# Patient Record
Sex: FEMALE | Race: WHITE | Employment: UNEMPLOYED | URBAN - METROPOLITAN AREA
[De-identification: names, ages, dates, MRNs, and addresses within clinical notes are randomized per-mention and may not be internally consistent; named-entity substitution may affect disease eponyms.]

---

## 2017-01-25 ENCOUNTER — APPOINTMENT (OUTPATIENT)
Dept: GENERAL RADIOLOGY | Age: 42
End: 2017-01-25
Attending: PHYSICIAN ASSISTANT
Payer: SELF-PAY

## 2017-01-25 ENCOUNTER — HOSPITAL ENCOUNTER (EMERGENCY)
Age: 42
Discharge: HOME | End: 2017-01-25
Attending: STUDENT IN AN ORGANIZED HEALTH CARE EDUCATION/TRAINING PROGRAM
Payer: SELF-PAY

## 2017-01-25 VITALS
HEART RATE: 82 BPM | RESPIRATION RATE: 16 BRPM | DIASTOLIC BLOOD PRESSURE: 85 MMHG | HEIGHT: 65 IN | WEIGHT: 290 LBS | SYSTOLIC BLOOD PRESSURE: 134 MMHG | OXYGEN SATURATION: 97 % | TEMPERATURE: 98.8 F | BODY MASS INDEX: 48.32 KG/M2

## 2017-01-25 DIAGNOSIS — S16.1XXA CERVICAL STRAIN, INITIAL ENCOUNTER: ICD-10-CM

## 2017-01-25 DIAGNOSIS — S09.90XA CHI (CLOSED HEAD INJURY), INITIAL ENCOUNTER: ICD-10-CM
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
## 2017-01-25 DIAGNOSIS — S39.012A LUMBAR STRAIN, INITIAL ENCOUNTER: ICD-10-CM

## 2017-01-25 PROCEDURE — 99283 EMERGENCY DEPT VISIT LOW MDM: CPT

## 2017-01-25 PROCEDURE — 72100 X-RAY EXAM L-S SPINE 2/3 VWS: CPT

## 2017-01-25 PROCEDURE — 74011250637 HC RX REV CODE- 250/637: Performed by: PHYSICIAN ASSISTANT

## 2017-01-25 PROCEDURE — 72050 X-RAY EXAM NECK SPINE 4/5VWS: CPT

## 2017-01-25 RX ORDER — CYCLOBENZAPRINE HCL 10 MG
10 TABLET ORAL
Status: COMPLETED | OUTPATIENT
Start: 2017-01-25 | End: 2017-01-25

## 2017-01-25 RX ORDER — CYCLOBENZAPRINE HCL 10 MG
10 TABLET ORAL
Qty: 12 TAB | Refills: 0 | Status: SHIPPED | OUTPATIENT
Start: 2017-01-25 | End: 2017-03-28

## 2017-01-25 RX ORDER — IBUPROFEN 600 MG/1
600 TABLET ORAL
Qty: 15 TAB | Refills: 0 | Status: SHIPPED | OUTPATIENT
Start: 2017-01-25 | End: 2017-03-28

## 2017-01-25 RX ADMIN — CYCLOBENZAPRINE HYDROCHLORIDE 10 MG: 10 TABLET, FILM COATED ORAL at 14:20
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
## 2017-01-25 NOTE — ED NOTES
Pt ambulatory out of ED with discharge instructions and prescriptions in hand given by JOSE L Morrison; pt verbalized understanding of discharge paperwork and time allotted for questions. VSS. Pt alert and oriented.  \

## 2017-01-27 DIAGNOSIS — F98.8 ADD (ATTENTION DEFICIT DISORDER): ICD-10-CM

## 2017-01-27 DIAGNOSIS — F41.9 ANXIETY: ICD-10-CM

## 2017-01-27 DIAGNOSIS — F42.9 OCD (OBSESSIVE COMPULSIVE DISORDER): ICD-10-CM

## 2017-01-27 DIAGNOSIS — E03.9 HYPOTHYROIDISM, UNSPECIFIED TYPE: ICD-10-CM

## 2017-01-27 RX ORDER — SERTRALINE HYDROCHLORIDE 100 MG/1
100 TABLET, FILM COATED ORAL DAILY
Qty: 90 TAB | Refills: 0 | Status: SHIPPED | OUTPATIENT
Start: 2017-01-27 | End: 2017-03-24 | Stop reason: SDUPTHER

## 2017-01-27 RX ORDER — DEXTROAMPHETAMINE SACCHARATE, AMPHETAMINE ASPARTATE, DEXTROAMPHETAMINE SULFATE AND AMPHETAMINE SULFATE 7.5; 7.5; 7.5; 7.5 MG/1; MG/1; MG/1; MG/1
30 TABLET ORAL DAILY
Qty: 30 TAB | Refills: 0 | Status: SHIPPED | OUTPATIENT
Start: 2017-01-27 | End: 2017-02-27 | Stop reason: SDUPTHER

## 2017-01-27 RX ORDER — LISINOPRIL AND HYDROCHLOROTHIAZIDE 10; 12.5 MG/1; MG/1
1 TABLET ORAL DAILY
Qty: 90 TAB | Refills: 0 | Status: SHIPPED | OUTPATIENT
Start: 2017-01-27 | End: 2017-03-24 | Stop reason: SDUPTHER

## 2017-01-27 RX ORDER — LEVOTHYROXINE SODIUM 150 UG/1
150 TABLET ORAL
Qty: 90 TAB | Refills: 0 | Status: SHIPPED | OUTPATIENT
Start: 2017-01-27 | End: 2017-03-24 | Stop reason: SDUPTHER

## 2017-02-23 ENCOUNTER — ED HISTORICAL/CONVERTED ENCOUNTER (OUTPATIENT)
Dept: OTHER | Age: 42
End: 2017-02-23

## 2017-02-27 DIAGNOSIS — F98.8 ADD (ATTENTION DEFICIT DISORDER): ICD-10-CM

## 2017-02-27 DIAGNOSIS — E03.9 HYPOTHYROIDISM, UNSPECIFIED TYPE: ICD-10-CM

## 2017-02-27 RX ORDER — DEXTROAMPHETAMINE SACCHARATE, AMPHETAMINE ASPARTATE, DEXTROAMPHETAMINE SULFATE AND AMPHETAMINE SULFATE 7.5; 7.5; 7.5; 7.5 MG/1; MG/1; MG/1; MG/1
30 TABLET ORAL DAILY
Qty: 30 TAB | Refills: 0 | Status: SHIPPED | OUTPATIENT
Start: 2017-02-27 | End: 2017-03-24 | Stop reason: SDUPTHER

## 2017-02-28 ENCOUNTER — OFFICE VISIT (OUTPATIENT)
Dept: INTERNAL MEDICINE CLINIC | Facility: CLINIC | Age: 42
End: 2017-02-28

## 2017-02-28 VITALS
SYSTOLIC BLOOD PRESSURE: 125 MMHG | RESPIRATION RATE: 16 BRPM | WEIGHT: 283 LBS | TEMPERATURE: 98.3 F | DIASTOLIC BLOOD PRESSURE: 84 MMHG | HEIGHT: 65 IN | HEART RATE: 90 BPM | OXYGEN SATURATION: 93 % | BODY MASS INDEX: 47.15 KG/M2

## 2017-02-28 DIAGNOSIS — J11.1 INFLUENZA: Primary | ICD-10-CM

## 2017-02-28 DIAGNOSIS — J40 BRONCHITIS: ICD-10-CM

## 2017-02-28 RX ORDER — ALBUTEROL SULFATE 90 UG/1
1 AEROSOL, METERED RESPIRATORY (INHALATION)
Qty: 1 INHALER | Refills: 0 | Status: SHIPPED | OUTPATIENT
Start: 2017-02-28 | End: 2018-02-06 | Stop reason: SDUPTHER

## 2017-02-28 RX ORDER — BENZONATATE 200 MG/1
200 CAPSULE ORAL
Qty: 30 CAP | Refills: 0 | Status: SHIPPED | OUTPATIENT
Start: 2017-02-28 | End: 2017-03-07

## 2017-02-28 RX ORDER — METHYLPREDNISOLONE 4 MG/1
TABLET ORAL
Qty: 1 DOSE PACK | Refills: 0 | Status: SHIPPED | OUTPATIENT
Start: 2017-02-28 | End: 2017-03-28

## 2017-02-28 NOTE — PROGRESS NOTES
Chief Complaint   Patient presents with    Cough     Pt stated diagnosed with the flu last week and now she has has developed a dry cough    Shortness of Breath     1. Have you been to the ER, urgent care clinic since your last visit? Hospitalized since your last visit? Yes, [facility] last week for the Flu    2. Have you seen or consulted any other health care providers outside of the [facility] since your last visit? Include any pap smears or colon screening.   No

## 2017-02-28 NOTE — LETTER
NOTIFICATION RETURN TO WORK / SCHOOL 
 
2/28/2017 12:50 PM 
 
Ms. Shmuel Rodríguez [address] [place] [address] To Whom It May Concern: 
 
Shmuel Rodríguez is currently under the care of Susy Atkinson.. Please excuse Ms. Soares from work 2/23/17-3/5/17. She will return to work/school on: 3/6/17. If there are questions or concerns please have the patient contact our office. Sincerely, Jarred Isbell PA-C

## 2017-02-28 NOTE — PROGRESS NOTES
HISTORY OF PRESENT ILLNESS  Duke Gitelman is a 42 y.o. female. HPI  1) Sick since 2/20. Dx with the flu last week on 2/23 at [facility] - positive flu test. Didn't get Tamiflu because she was outside of the 48 hour window. Cough persists and seems to be getting worse - also now SOB. Cough is productive at night and in the early morning. Taking Mucinex, but pt feels that it is not helping. No nasal congestion/fever/ear pain. Sore throat only with cough. Review of Systems   Constitutional: Positive for malaise/fatigue. Negative for fever. HENT: Positive for congestion. Negative for ear pain. Respiratory: Positive for cough, sputum production and shortness of breath. Endo/Heme/Allergies: Negative for environmental allergies. Physical Exam   Constitutional: She is oriented to person, place, and time. She appears well-developed and well-nourished. No distress. HENT:   Head: Normocephalic and atraumatic. Right Ear: External ear normal.   Left Ear: External ear normal.   Mouth/Throat: Oropharynx is clear and moist.   Nasal mucosa red, inflamed. Eyes: Conjunctivae are normal.   Neck: Neck supple. Cardiovascular: Normal rate, regular rhythm and normal heart sounds. Pulmonary/Chest: Effort normal and breath sounds normal.   Lymphadenopathy:     She has no cervical adenopathy. Neurological: She is alert and oriented to person, place, and time. Skin: Skin is warm and dry. Nursing note and vitals reviewed. ASSESSMENT and PLAN    ICD-10-CM ICD-9-CM    1. Influenza J11.1 487.1 Advised pt that the flu often takes 2 weeks to resolve. Work note written   2. Bronchitis J40 490 Secondary to influenza. Call Friday [facility] for AB for possible secondary bacterial infection.      albuterol (PROVENTIL HFA, VENTOLIN HFA, PROAIR HFA) 90 mcg/actuation inhaler      benzonatate (TESSALON) 200 mg capsule      methylPREDNISolone (MEDROL DOSEPACK) 4 mg tablet

## 2017-03-09 ENCOUNTER — TELEPHONE (OUTPATIENT)
Dept: INTERNAL MEDICINE CLINIC | Facility: CLINIC | Age: 42
End: 2017-03-09

## 2017-03-09 NOTE — TELEPHONE ENCOUNTER
[phone]  Patient is calling to inform JOSE L Santillan that her cough is not getting any better. Patient mentioned she was instructed to call office if symptoms did not improve and that JOSE L Santillan would prescribe her an antibiotic.

## 2017-03-10 RX ORDER — AMOXICILLIN AND CLAVULANATE POTASSIUM 875; 125 MG/1; MG/1
1 TABLET, FILM COATED ORAL EVERY 12 HOURS
Qty: 14 TAB | Refills: 0 | Status: SHIPPED | OUTPATIENT
Start: 2017-03-10 | End: 2017-03-28

## 2017-03-10 NOTE — TELEPHONE ENCOUNTER
Please inform pt that I sent in an antibiotic and that I'm sorry the message was sent to the wrong provider yesterday.

## 2017-03-13 RX ORDER — FLUCONAZOLE 150 MG/1
150 TABLET ORAL
Qty: 2 TAB | Refills: 0 | Status: SHIPPED | OUTPATIENT
Start: 2017-03-13 | End: 2017-03-21

## 2017-03-13 NOTE — TELEPHONE ENCOUNTER
Spoke to patient, who states that she is taking the antibiotics but is now experiencing some yeast infection as a side effect and is requesting some diflucan to be sent to the pharmacy

## 2017-03-28 ENCOUNTER — OFFICE VISIT (OUTPATIENT)
Dept: INTERNAL MEDICINE CLINIC | Facility: CLINIC | Age: 42
End: 2017-03-28

## 2017-03-28 VITALS
TEMPERATURE: 99.4 F | BODY MASS INDEX: 47.98 KG/M2 | RESPIRATION RATE: 18 BRPM | HEIGHT: 65 IN | WEIGHT: 288 LBS | DIASTOLIC BLOOD PRESSURE: 78 MMHG | OXYGEN SATURATION: 96 % | HEART RATE: 97 BPM | SYSTOLIC BLOOD PRESSURE: 133 MMHG

## 2017-03-28 DIAGNOSIS — E03.9 HYPOTHYROIDISM, UNSPECIFIED TYPE: ICD-10-CM

## 2017-03-28 DIAGNOSIS — R79.82 CRP ELEVATED: ICD-10-CM

## 2017-03-28 DIAGNOSIS — E66.01 MORBID OBESITY, UNSPECIFIED OBESITY TYPE (HCC): ICD-10-CM

## 2017-03-28 DIAGNOSIS — N76.0 ACUTE VAGINITIS: Primary | ICD-10-CM

## 2017-03-28 RX ORDER — TINIDAZOLE 500 MG/1
1 TABLET ORAL
Qty: 10 TAB | Refills: 0 | Status: SHIPPED | OUTPATIENT
Start: 2017-03-28 | End: 2017-10-19 | Stop reason: SDUPTHER

## 2017-03-28 NOTE — MR AVS SNAPSHOT
Visit Information Date & Time Provider Department Dept. Phone Encounter #  
 3/28/2017 10:15 AM Tila Iqbal PA-C [facility] Internal Medicine [phone] [id number] Follow-up Instructions Return in about 6 weeks (around 5/9/2017) for 45 minute physical with pap. Upcoming Health Maintenance Date Due  
 PAP AKA CERVICAL CYTOLOGY 7/28/2016 DTaP/Tdap/Td series (2 - Td) 9/6/2026 Allergies as of 3/28/2017  Review Complete On: 3/28/2017 By: Tila Iqbal PA-C Severity Noted Reaction Type Reactions Darvocet A500 [Propoxyphene N-acetaminophen]  12/01/2010    Hives Current Immunizations  Never Reviewed Name Date Influenza Vaccine (Quad) PF 9/6/2016 Tdap 9/6/2016 Not reviewed this visit You Were Diagnosed With   
  
 Codes Comments Acute vaginitis    -  Primary ICD-10-CM: N76.0 ICD-9-CM: 616.10 Morbid obesity, unspecified obesity type ([facility] [address]     ICD-10-CM: E66.01 
ICD-9-CM: 278.01 Hypothyroidism, unspecified type     ICD-10-CM: E03.9 ICD-9-CM: 244.9 CRP elevated     ICD-10-CM: R79.82 ICD-9-CM: 790.95 Vitals BP Pulse Temp Resp Height(growth percentile) Weight(growth percentile) 133/78 (BP 1 Location: Left arm, BP Patient Position: Sitting) 97 99.4 °F (37.4 °C) (Oral) 18 5' 5\" (1.651 m) 288 lb (130.6 kg) LMP SpO2 BMI OB Status Smoking Status 03/20/2017 96% 47.93 kg/m2 Having regular periods Never Smoker Vitals History BMI and BSA Data Body Mass Index Body Surface Area  
 47.93 kg/m 2 2.45 m 2 Preferred Pharmacy Pharmacy Name Phone [facility] [address] Your Updated Medication List  
  
   
This list is accurate as of: 3/28/17 10:59 AM.  Always use your most recent med list.  
  
  
  
  
 albuterol 90 mcg/actuation inhaler Commonly known as:  PROVENTIL HFA, VENTOLIN HFA, PROAIR HFA  
 Take 1 Puff by inhalation every four (4) hours as needed for Shortness of Breath. dextroamphetamine-amphetamine 30 mg tablet Commonly known as:  ADDERALL Take 1 Tab by mouth dailyEarliest Fill Date: 3/27/17. Max Daily Amount: 1 Tab  
  
 levothyroxine 150 mcg tablet Commonly known as:  SYNTHROID Take 1 Tab by mouth Daily (before breakfast). Indications: hypothyroidism  
  
 lisinopril-hydroCHLOROthiazide 10-12.5 mg per tablet Commonly known as:  Alisha Schilder Take 1 Tab by mouth daily. sertraline 100 mg tablet Commonly known as:  ZOLOFT Take 1 Tab by mouth daily. Indications: GENERALIZED ANXIETY DISORDER  
  
 tinidazole 500 mg tablet Commonly known as:  KJWTSSSG Take 2 Tabs by mouth Daily (before breakfast) for 5 days. Indications: BACTERIAL VAGINOSIS Prescriptions Sent to Pharmacy Refills  
 tinidazole (TINDAMAX) 500 mg tablet 0 Sig: Take 2 Tabs by mouth Daily (before breakfast) for 5 days. Indications: BACTERIAL VAGINOSIS Class: Normal  
 Pharmacy: [address] [place] [address] #: [phone] Route: Oral  
  
We Performed the Following C REACTIVE PROTEIN, QT [88856 CPT(R)] [address] [id number] Custom] Comments:  
 [employer] Not Covered REFERRAL TO BARIATRIC SURGERY [HED321 Custom] THYROID CASCADE PROFILE [[id number] Custom] Follow-up Instructions Return in about 6 weeks (around 5/9/2017) for 45 minute physical with pap. Referral Information Referral ID Referred By Referred To  
  
 [id number] [place] MD   
   [address] [place] [address] [place] Phone: [phone] Fax: [phone] Visits Status Start Date End Date 1 New Request 3/28/17 3/28/18 If your referral has a status of pending review or denied, additional information will be sent to support the outcome of this decision. Introducing [facility] & HEALTH SERVICES! Dear Wilma Jane: Thank you for requesting a y prime account. Our records indicate that you already have an active y prime account. You can access your account anytime at [email or web address] Did you know that you can access your hospital and ER discharge instructions at any time in y prime? You can also review all of your test results from your hospital stay or ER visit. Additional Information If you have questions, please visit the Frequently Asked Questions section of the y prime website at [email or web address] Remember, y prime is NOT to be used for urgent needs. For medical emergencies, dial 911. Now available from your iPhone and Android! Please provide this summary of care documentation to your next provider. Your primary care clinician is listed as Erwin Thomas. If you have any questions after today's visit, please call [phone].

## 2017-03-28 NOTE — PROGRESS NOTES
Room 8    Chief Complaint   Patient presents with    Vaginal Discharge     Patient believes she has bacteria vaginosis     1. Have you been to the ER, urgent care clinic since your last visit? Hospitalized since your last visit? No    2. Have you seen or consulted any other health care providers outside of the [facility] since your last visit? Include any pap smears or colon screening.  No     Health Maintenance Due   Topic Date Due    PAP AKA CERVICAL CYTOLOGY  07/28/2016

## 2017-03-31 ENCOUNTER — TELEPHONE (OUTPATIENT)
Dept: INTERNAL MEDICINE CLINIC | Facility: CLINIC | Age: 42
End: 2017-03-31

## 2017-03-31 DIAGNOSIS — E03.9 HYPOTHYROIDISM, UNSPECIFIED TYPE: ICD-10-CM

## 2017-03-31 DIAGNOSIS — R79.82 CRP ELEVATED: Primary | ICD-10-CM

## 2017-03-31 LAB
A VAGINAE DNA VAG QL NAA+PROBE: ABNORMAL SCORE
BVAB2 DNA VAG QL NAA+PROBE: ABNORMAL SCORE
C ALBICANS DNA VAG QL NAA+PROBE: NEGATIVE
C GLABRATA DNA VAG QL NAA+PROBE: NEGATIVE
C TRACH RRNA SPEC QL NAA+PROBE: NEGATIVE
CRP SERPL-MCNC: 8.4 MG/L (ref 0–4.9)
INTERPRETIVE COMMENT, 330017: ABNORMAL
MEGA1 DNA VAG QL NAA+PROBE: ABNORMAL SCORE
N GONORRHOEA RRNA SPEC QL NAA+PROBE: NEGATIVE
T VAGINALIS RRNA SPEC QL NAA+PROBE: NEGATIVE
T4 FREE SERPL-MCNC: 0.95 NG/DL (ref 0.82–1.77)
THYROPEROXIDASE AB SERPL-ACNC: 285 IU/ML (ref 0–34)
TSH SERPL DL<=0.005 MIU/L-ACNC: 8.01 UIU/ML (ref 0.45–4.5)

## 2017-03-31 RX ORDER — LEVOTHYROXINE SODIUM 175 UG/1
175 TABLET ORAL
Qty: 30 TAB | Refills: 5 | Status: SHIPPED | OUTPATIENT
Start: 2017-03-31 | End: 2017-04-27 | Stop reason: SDUPTHER

## 2017-03-31 NOTE — PROGRESS NOTES
HISTORY OF PRESENT ILLNESS  Nichola Saint is a 42 y.o. female. HPI  1) ? BV - Previously had BV every month. Rx'd Tindamax  This started immediately after menses this month. Symptoms x 2 days. Using tampons during the day and pads at night for period. Has not changed brands in years. Washing with Zest. Has not changed soaps in years. In the past, pt reports that when she had:  BV - brown discharge with odor  Yeast - discharge is white and there is no odor. Had 2 rounds of antibiotics earlier this month. Took Diflucan 1 pill weekly x 2 weeks. In the past, required Tinidazole for BV. Pt reports dose was 2000 mg x 10 days. 2) Wants referral to bariatric surgeon. Insurance has recently changed. Has changed eating habits (stopped bread/sodas, eating yogurt regularly, stopped eating starches at night). Walking 1.7 miles twice daily 5x/week. Wt Readings from Last 3 Encounters:   03/28/17 288 lb (130.6 kg)   02/28/17 283 lb (128.4 kg)   01/25/17 290 lb (131.5 kg)     3) Due for repeat thyroid labs    Review of Systems   Constitutional: Positive for malaise/fatigue. Negative for weight loss. Gastrointestinal: Negative for abdominal pain. Genitourinary:        Vaginal discharge - brown, curdy       Physical Exam   Constitutional: She is oriented to person, place, and time. She appears well-developed and well-nourished. No distress. HENT:   Head: Normocephalic and atraumatic. Neck: Neck supple. No JVD present. Cardiovascular: Normal rate, regular rhythm and normal heart sounds. Pulmonary/Chest: Effort normal and breath sounds normal. No respiratory distress. Genitourinary:   Genitourinary Comments: Internal labia and vaginal opening red, irritated. Clear viscous discharge noted. No odor. Musculoskeletal: She exhibits no edema. Neurological: She is alert and oriented to person, place, and time. Skin: Skin is warm and dry. Psychiatric: She has a normal mood and affect.  Her behavior is normal. Judgment and thought content normal.   Nursing note and vitals reviewed. ASSESSMENT and PLAN    ICD-10-CM ICD-9-CM    1. Acute vaginitis N76.0 616.10 Suspect BV  tinidazole (TINDAMAX) 500 mg tablet      NUSWAB VAGINITIS PLUS    Suggested that pt change to [employer] and try menstrual cup or other tampon brand to see if this prevents reoccurrence of BV. 2. Morbid obesity, unspecified obesity type ([facility] [address] E66.01 278.01 REFERRAL TO BARIATRIC SURGERY   3. Hypothyroidism, unspecified type E03.9 244.9 THYROID CASCADE PROFILE   4.  CRP elevated R79.82 790.95 C REACTIVE PROTEIN, QT 7

## 2017-03-31 NOTE — TELEPHONE ENCOUNTER
Persistently elevated CRP - referral to rheum. Not yet adequately treated hypothyroidism. Increase dose to 175 mcg. Sent pt a message.

## 2017-04-27 DIAGNOSIS — E03.9 HYPOTHYROIDISM, UNSPECIFIED TYPE: ICD-10-CM

## 2017-04-27 DIAGNOSIS — F98.8 ADD (ATTENTION DEFICIT DISORDER): ICD-10-CM

## 2017-04-27 DIAGNOSIS — F41.9 ANXIETY: ICD-10-CM

## 2017-04-27 RX ORDER — LEVOTHYROXINE SODIUM 175 UG/1
175 TABLET ORAL
Qty: 30 TAB | Refills: 5 | Status: SHIPPED | OUTPATIENT
Start: 2017-04-27 | End: 2017-05-21 | Stop reason: SDUPTHER

## 2017-04-27 RX ORDER — LISINOPRIL AND HYDROCHLOROTHIAZIDE 10; 12.5 MG/1; MG/1
1 TABLET ORAL DAILY
Qty: 90 TAB | Refills: 1 | Status: SHIPPED | OUTPATIENT
Start: 2017-04-27 | End: 2017-06-26 | Stop reason: SDUPTHER

## 2017-04-27 RX ORDER — SERTRALINE HYDROCHLORIDE 100 MG/1
100 TABLET, FILM COATED ORAL DAILY
Qty: 90 TAB | Refills: 3 | Status: SHIPPED | OUTPATIENT
Start: 2017-04-27 | End: 2017-05-21 | Stop reason: SDUPTHER

## 2017-04-27 RX ORDER — DEXTROAMPHETAMINE SACCHARATE, AMPHETAMINE ASPARTATE, DEXTROAMPHETAMINE SULFATE AND AMPHETAMINE SULFATE 7.5; 7.5; 7.5; 7.5 MG/1; MG/1; MG/1; MG/1
30 TABLET ORAL DAILY
Qty: 30 TAB | Refills: 0 | Status: SHIPPED | OUTPATIENT
Start: 2017-04-27 | End: 2017-05-21 | Stop reason: SDUPTHER

## 2017-06-26 DIAGNOSIS — F41.9 ANXIETY: ICD-10-CM

## 2017-06-26 DIAGNOSIS — F98.8 ADD (ATTENTION DEFICIT DISORDER): ICD-10-CM

## 2017-06-26 DIAGNOSIS — E03.9 HYPOTHYROIDISM, UNSPECIFIED TYPE: ICD-10-CM

## 2017-06-26 RX ORDER — LEVOTHYROXINE SODIUM 175 UG/1
175 TABLET ORAL
Qty: 30 TAB | Refills: 5 | Status: SHIPPED | OUTPATIENT
Start: 2017-06-26 | End: 2017-07-21 | Stop reason: SDUPTHER

## 2017-06-26 RX ORDER — LISINOPRIL AND HYDROCHLOROTHIAZIDE 10; 12.5 MG/1; MG/1
1 TABLET ORAL DAILY
Qty: 90 TAB | Refills: 1 | Status: SHIPPED | OUTPATIENT
Start: 2017-06-26 | End: 2017-07-21 | Stop reason: SDUPTHER

## 2017-06-26 RX ORDER — DEXTROAMPHETAMINE SACCHARATE, AMPHETAMINE ASPARTATE, DEXTROAMPHETAMINE SULFATE AND AMPHETAMINE SULFATE 7.5; 7.5; 7.5; 7.5 MG/1; MG/1; MG/1; MG/1
30 TABLET ORAL DAILY
Qty: 30 TAB | Refills: 0 | Status: SHIPPED | OUTPATIENT
Start: 2017-06-26 | End: 2017-07-21 | Stop reason: SDUPTHER

## 2017-06-26 RX ORDER — SERTRALINE HYDROCHLORIDE 100 MG/1
100 TABLET, FILM COATED ORAL DAILY
Qty: 90 TAB | Refills: 3 | Status: SHIPPED | OUTPATIENT
Start: 2017-06-26 | End: 2017-07-21 | Stop reason: SDUPTHER

## 2017-07-21 DIAGNOSIS — F41.9 ANXIETY: ICD-10-CM

## 2017-07-21 DIAGNOSIS — E03.9 HYPOTHYROIDISM, UNSPECIFIED TYPE: ICD-10-CM

## 2017-07-21 DIAGNOSIS — F98.8 ADD (ATTENTION DEFICIT DISORDER): ICD-10-CM

## 2017-07-21 RX ORDER — LEVOTHYROXINE SODIUM 175 UG/1
175 TABLET ORAL
Qty: 30 TAB | Refills: 5 | Status: SHIPPED | OUTPATIENT
Start: 2017-07-21 | End: 2017-08-31 | Stop reason: SDUPTHER

## 2017-07-21 RX ORDER — LISINOPRIL AND HYDROCHLOROTHIAZIDE 10; 12.5 MG/1; MG/1
1 TABLET ORAL DAILY
Qty: 90 TAB | Refills: 1 | Status: SHIPPED | OUTPATIENT
Start: 2017-07-21 | End: 2017-10-24 | Stop reason: SDUPTHER

## 2017-07-21 RX ORDER — DEXTROAMPHETAMINE SACCHARATE, AMPHETAMINE ASPARTATE, DEXTROAMPHETAMINE SULFATE AND AMPHETAMINE SULFATE 7.5; 7.5; 7.5; 7.5 MG/1; MG/1; MG/1; MG/1
30 TABLET ORAL DAILY
Qty: 30 TAB | Refills: 0 | Status: SHIPPED | OUTPATIENT
Start: 2017-07-21 | End: 2017-08-31 | Stop reason: SDUPTHER

## 2017-07-21 RX ORDER — SERTRALINE HYDROCHLORIDE 100 MG/1
100 TABLET, FILM COATED ORAL DAILY
Qty: 90 TAB | Refills: 3 | Status: SHIPPED | OUTPATIENT
Start: 2017-07-21 | End: 2017-08-31 | Stop reason: SDUPTHER

## 2017-07-21 NOTE — TELEPHONE ENCOUNTER
Pt called stating she needs a refill on all of her medications because she left them at her daughter's house and is unable to get them medications.  Please advise [phone]

## 2017-08-31 ENCOUNTER — OFFICE VISIT (OUTPATIENT)
Dept: INTERNAL MEDICINE CLINIC | Facility: CLINIC | Age: 42
End: 2017-08-31

## 2017-08-31 VITALS
HEIGHT: 65 IN | HEART RATE: 98 BPM | DIASTOLIC BLOOD PRESSURE: 79 MMHG | TEMPERATURE: 99.8 F | SYSTOLIC BLOOD PRESSURE: 124 MMHG | BODY MASS INDEX: 48.32 KG/M2 | RESPIRATION RATE: 18 BRPM | OXYGEN SATURATION: 96 % | WEIGHT: 290 LBS

## 2017-08-31 DIAGNOSIS — E03.9 HYPOTHYROIDISM, UNSPECIFIED TYPE: ICD-10-CM

## 2017-08-31 DIAGNOSIS — Z23 ENCOUNTER FOR IMMUNIZATION: ICD-10-CM

## 2017-08-31 DIAGNOSIS — G47.30 SLEEP APNEA, UNSPECIFIED TYPE: ICD-10-CM

## 2017-08-31 DIAGNOSIS — I10 ESSENTIAL HYPERTENSION: Primary | ICD-10-CM

## 2017-08-31 DIAGNOSIS — F41.9 ANXIETY: ICD-10-CM

## 2017-08-31 DIAGNOSIS — E66.01 MORBID OBESITY, UNSPECIFIED OBESITY TYPE (HCC): ICD-10-CM

## 2017-08-31 DIAGNOSIS — F98.8 ADD (ATTENTION DEFICIT DISORDER): ICD-10-CM

## 2017-08-31 RX ORDER — SERTRALINE HYDROCHLORIDE 100 MG/1
100 TABLET, FILM COATED ORAL DAILY
Qty: 90 TAB | Refills: 3 | Status: SHIPPED | OUTPATIENT
Start: 2017-08-31 | End: 2017-12-26

## 2017-08-31 RX ORDER — LEVOTHYROXINE SODIUM 175 UG/1
175 TABLET ORAL
Qty: 30 TAB | Refills: 1 | Status: SHIPPED | OUTPATIENT
Start: 2017-08-31 | End: 2017-10-24 | Stop reason: SDUPTHER

## 2017-08-31 RX ORDER — DEXTROAMPHETAMINE SACCHARATE, AMPHETAMINE ASPARTATE, DEXTROAMPHETAMINE SULFATE AND AMPHETAMINE SULFATE 7.5; 7.5; 7.5; 7.5 MG/1; MG/1; MG/1; MG/1
30 TABLET ORAL DAILY
Qty: 30 TAB | Refills: 0 | Status: SHIPPED | OUTPATIENT
Start: 2017-08-31 | End: 2017-09-25 | Stop reason: SDUPTHER

## 2017-08-31 NOTE — PROGRESS NOTES
Room 8    Chief Complaint   Patient presents with    Medication Refill     Follow up     1. Have you been to the ER, urgent care clinic since your last visit? Hospitalized since your last visit? No    2. Have you seen or consulted any other health care providers outside of the [address] [place] [address] since your last visit? Include any pap smears or colon screening.  No     Health Maintenance Due   Topic Date Due    PAP AKA CERVICAL CYTOLOGY  07/28/2016    INFLUENZA AGE 9 TO ADULT  08/01/2017

## 2017-08-31 NOTE — MR AVS SNAPSHOT
Visit Information Date & Time Provider Department Dept. Phone Encounter #  
 8/31/2017  1:45 PM Duke Wilkins PA-C [facility] Internal Medicine [phone] [id number] Follow-up Instructions Return in about 2 weeks (around 9/14/2017) for Follow up HTN, Sleep Apnea, CRP with fasting labs that day as well. Sepideh Peaks Upcoming Health Maintenance Date Due  
 PAP AKA CERVICAL CYTOLOGY 7/28/2016 INFLUENZA AGE 9 TO ADULT 8/1/2017 DTaP/Tdap/Td series (2 - Td) 9/6/2026 Allergies as of 8/31/2017  Review Complete On: 8/31/2017 By: Raymundo Romero LPN Severity Noted Reaction Type Reactions Darvocet A500 [Propoxyphene N-acetaminophen]  12/01/2010    Hives Current Immunizations  Never Reviewed Name Date Influenza Vaccine (Quad) PF  Incomplete, 9/6/2016 Tdap 9/6/2016 Not reviewed this visit You Were Diagnosed With   
  
 Codes Comments Encounter for immunization    -  Primary ICD-10-CM: U92 ICD-9-CM: V03.89 Essential hypertension     ICD-10-CM: I10 
ICD-9-CM: 401.9 Vitals BP Pulse Temp Resp Height(growth percentile) Weight(growth percentile) 124/79 (BP 1 Location: Left arm, BP Patient Position: Sitting) 98 99.8 °F (37.7 °C) (Oral) 18 5' 5\" (1.651 m) 290 lb (131.5 kg) SpO2 BMI OB Status Smoking Status 96% 48.26 kg/m2 Having regular periods Never Smoker Vitals History BMI and BSA Data Body Mass Index Body Surface Area  
 48.26 kg/m 2 2.46 m 2 Preferred Pharmacy Pharmacy Name Phone [facility] [address] [facility] [phone] Your Updated Medication List  
  
   
This list is accurate as of: 8/31/17  2:09 PM.  Always use your most recent med list.  
  
  
  
  
 albuterol 90 mcg/actuation inhaler Commonly known as:  PROVENTIL HFA, VENTOLIN HFA, PROAIR HFA Take 1 Puff by inhalation every four (4) hours as needed for Shortness of Breath. dextroamphetamine-amphetamine 30 mg tablet Commonly known as:  ADDERALL Take 1 Tab by mouth dailyEarliest Fill Date: 7/21/17. Max Daily Amount: 1 Tab  
  
 levothyroxine 175 mcg tablet Commonly known as:  SYNTHROID Take 1 Tab by mouth Daily (before breakfast). lisinopril-hydroCHLOROthiazide 10-12.5 mg per tablet Commonly known as:  Margie Buttner Take 1 Tab by mouth daily. sertraline 100 mg tablet Commonly known as:  ZOLOFT Take 1 Tab by mouth daily. Indications: GENERALIZED ANXIETY DISORDER We Performed the Following INFLUENZA VIRUS VAC QUAD,SPLIT,PRESV FREE SYRINGE 3/> YRS IM [id number] CPT(R)] Follow-up Instructions Return in about 2 weeks (around 9/14/2017) for Follow up HTN, Sleep Apnea, CRP with fasting labs that day as well. .  
  
  
Introducing [facility] & HEALTH SERVICES! Dear Eric Crabtree: Thank you for requesting a Spinzo account. Our records indicate that you already have an active Spinzo account. You can access your account anytime at [email or web address] A.P Avanashiappa Silk/TBi Connect Did you know that you can access your hospital and ER discharge instructions at any time in Spinzo? You can also review all of your test results from your hospital stay or ER visit. Additional Information If you have questions, please visit the Frequently Asked Questions section of the Spinzo website at [email or web address] Remember, Spinzo is NOT to be used for urgent needs. For medical emergencies, dial 911. Now available from your iPhone and Android! Please provide this summary of care documentation to your next provider. Your primary care clinician is listed as Dinah Engle. If you have any questions after today's visit, please call [phone].

## 2017-08-31 NOTE — PROGRESS NOTES
HISTORY OF PRESENT ILLNESS  Maria Elena Calderon is a 36 y.o. female. HPI  1) HTN - Controlled today off of medication x 2 weeks. Pt notes no change in diet/exercise. She was not able to have bariatric surgery because of lack of insurance coverage. She is eating a high carb, low protein diet, and does not know her calorie count/day. She is not exercising. Pt has gained 2 lbs since last visit. Wt Readings from Last 3 Encounters:   08/31/17 290 lb (131.5 kg)   03/28/17 288 lb (130.6 kg)   02/28/17 283 lb (128.4 kg)     2) Hypothyroidism - Uncontrolled at last visit. Off of medication x 2 weeks. Lab Results   Component Value Date/Time    TSH 8.010 03/28/2017 10:55 AM    TSH 30.220 09/06/2016 11:37 AM     3) ADD - Taking Adderall 30 mg every day. Doing well on this dose. 4) Elevated CRP - persisted at recheck in March - rheumatology visit suggested 5 months ago. Needs recheck on CRP. Review of Systems   Respiratory: Negative for shortness of breath. Cardiovascular: Negative for chest pain and palpitations. Neurological: Negative for dizziness, loss of consciousness and weakness. Physical Exam   Constitutional: She is oriented to person, place, and time. She appears well-developed and well-nourished. No distress. HENT:   Head: Normocephalic and atraumatic. Neck: Neck supple. No JVD present. Cardiovascular: Normal rate, regular rhythm and normal heart sounds. Pulmonary/Chest: Effort normal and breath sounds normal. No respiratory distress. Musculoskeletal: She exhibits no edema. Neurological: She is alert and oriented to person, place, and time. Skin: Skin is warm and dry. Psychiatric: She has a normal mood and affect. Her behavior is normal. Judgment and thought content normal.   Nursing note and vitals reviewed. ASSESSMENT and PLAN    ICD-10-CM ICD-9-CM    1. Essential hypertension I10 401.9 Controlled off of medication. Remain off of medication and follow up in 2 weeks. 2. Encounter for immunization Z23 V03.89 INFLUENZA VIRUS VAC QUAD,SPLIT,PRESV FREE SYRINGE 3/> YRS IM   3. Hypothyroidism, unspecified type E03.9 244.9 Refill levothyroxine (SYNTHROID) 175 mcg tablet. Recheck labs in 2 weeks    4. Morbid obesity, unspecified obesity type ([facility] E66.01 278.01 Discussed high protein, low carb, low calorie diet and increase exercise if possible. Suggested My Fitness Pal.    5. Sleep apnea, unspecified type G47.30 780.57 Follow up with Sleep Med.    6. ADD (attention deficit disorder) F98.8 314.00 Controlled. Refill dextroamphetamine-amphetamine (ADDERALL) 30 mg tablet   7.  Anxiety F41.9 300.00 Refill sertraline (ZOLOFT) 100 mg tablet

## 2017-09-25 DIAGNOSIS — F98.8 ADD (ATTENTION DEFICIT DISORDER): ICD-10-CM

## 2017-09-25 RX ORDER — DEXTROAMPHETAMINE SACCHARATE, AMPHETAMINE ASPARTATE, DEXTROAMPHETAMINE SULFATE AND AMPHETAMINE SULFATE 7.5; 7.5; 7.5; 7.5 MG/1; MG/1; MG/1; MG/1
30 TABLET ORAL DAILY
Qty: 30 TAB | Refills: 0 | Status: SHIPPED | OUTPATIENT
Start: 2017-09-25 | End: 2017-10-24 | Stop reason: SDUPTHER

## 2017-10-24 DIAGNOSIS — E03.9 HYPOTHYROIDISM, UNSPECIFIED TYPE: ICD-10-CM

## 2017-10-24 DIAGNOSIS — F98.8 ADD (ATTENTION DEFICIT DISORDER): ICD-10-CM

## 2017-10-24 RX ORDER — DEXTROAMPHETAMINE SACCHARATE, AMPHETAMINE ASPARTATE, DEXTROAMPHETAMINE SULFATE AND AMPHETAMINE SULFATE 7.5; 7.5; 7.5; 7.5 MG/1; MG/1; MG/1; MG/1
30 TABLET ORAL DAILY
Qty: 30 TAB | Refills: 0 | Status: SHIPPED | OUTPATIENT
Start: 2017-10-24 | End: 2017-11-20 | Stop reason: SDUPTHER

## 2017-10-24 RX ORDER — LEVOTHYROXINE SODIUM 175 UG/1
175 TABLET ORAL
Qty: 90 TAB | Refills: 1 | Status: SHIPPED | OUTPATIENT
Start: 2017-10-24 | End: 2018-02-20 | Stop reason: SDUPTHER

## 2017-10-24 RX ORDER — LISINOPRIL AND HYDROCHLOROTHIAZIDE 10; 12.5 MG/1; MG/1
1 TABLET ORAL DAILY
Qty: 90 TAB | Refills: 1 | Status: SHIPPED | OUTPATIENT
Start: 2017-10-24 | End: 2017-12-26

## 2017-10-24 NOTE — TELEPHONE ENCOUNTER
From: Prudencio Soares  To: Izabela Patel PA-C  Sent: 10/24/2017 10:08 AM EDT  Subject: Medication Renewal Request    Original authorizing provider: Izabela Patel PA-C    Tricia Parisiambikalawrence Healy  Rodger would like a refill of the following medications:  lisinopril-hydroCHLOROthiazide (PRINZIDE, ZESTORETIC) 10-12.5 mg per tablet Izabela Patel PA-C]  levothyroxine (SYNTHROID) 175 mcg tablet Izabela Patel PA-C]  dextroamphetamine-amphetamine (ADDERALL) 30 mg tablet Izabela Patel PA-C]    Preferred pharmacy: [address]    Comment:

## 2017-11-20 DIAGNOSIS — F98.8 ADD (ATTENTION DEFICIT DISORDER): ICD-10-CM

## 2017-11-21 RX ORDER — DEXTROAMPHETAMINE SACCHARATE, AMPHETAMINE ASPARTATE, DEXTROAMPHETAMINE SULFATE AND AMPHETAMINE SULFATE 7.5; 7.5; 7.5; 7.5 MG/1; MG/1; MG/1; MG/1
30 TABLET ORAL DAILY
Qty: 30 TAB | Refills: 0 | Status: SHIPPED | OUTPATIENT
Start: 2017-11-21 | End: 2017-12-26

## 2017-11-21 NOTE — TELEPHONE ENCOUNTER
Please remind pt that she needs to be seen q3 months for Adderall Rx. This is last fill until appointment.

## 2017-11-21 NOTE — TELEPHONE ENCOUNTER
From: Zaid Soares  To: Dandy Griffith PA-C  Sent: 11/20/2017 9:13 PM EST  Subject: Medication Renewal Request    Original authorizing provider: KEVIN Friedte Mendy Soares would like a refill of the following medications:  dextroamphetamine-amphetamine (ADDERALL) 30 mg tablet Dandy rGiffith PA-C]    Preferred pharmacy: [address]    Comment:

## 2017-12-26 ENCOUNTER — OFFICE VISIT (OUTPATIENT)
Dept: INTERNAL MEDICINE CLINIC | Facility: CLINIC | Age: 42
End: 2017-12-26

## 2017-12-26 VITALS
HEIGHT: 65 IN | DIASTOLIC BLOOD PRESSURE: 76 MMHG | HEART RATE: 81 BPM | BODY MASS INDEX: 48.48 KG/M2 | SYSTOLIC BLOOD PRESSURE: 136 MMHG | TEMPERATURE: 98 F | RESPIRATION RATE: 18 BRPM | WEIGHT: 291 LBS

## 2017-12-26 DIAGNOSIS — J01.90 ACUTE NON-RECURRENT SINUSITIS, UNSPECIFIED LOCATION: ICD-10-CM

## 2017-12-26 DIAGNOSIS — F98.8 ATTENTION DEFICIT DISORDER, UNSPECIFIED HYPERACTIVITY PRESENCE: Primary | ICD-10-CM

## 2017-12-26 RX ORDER — GUAIFENESIN 600 MG/1
600 TABLET, EXTENDED RELEASE ORAL 2 TIMES DAILY
COMMUNITY
Start: 2017-12-26 | End: 2018-08-19

## 2017-12-26 RX ORDER — AMOXICILLIN 875 MG/1
875 TABLET, FILM COATED ORAL 2 TIMES DAILY
Qty: 14 TAB | Refills: 0 | Status: SHIPPED | OUTPATIENT
Start: 2017-12-26 | End: 2018-08-19

## 2017-12-26 RX ORDER — DEXTROAMPHETAMINE SACCHARATE, AMPHETAMINE ASPARTATE, DEXTROAMPHETAMINE SULFATE AND AMPHETAMINE SULFATE 5; 5; 5; 5 MG/1; MG/1; MG/1; MG/1
20 TABLET ORAL 2 TIMES DAILY
Qty: 60 TAB | Refills: 0 | Status: SHIPPED | OUTPATIENT
Start: 2017-12-26 | End: 2018-01-24 | Stop reason: SDUPTHER

## 2017-12-26 NOTE — PROGRESS NOTES
HISTORY OF PRESENT ILLNESS  Prudencio Gregorio is a 36 y.o. female. Chief Complaint   Patient presents with    Sinus Infection     for 1 week    Medication Evaluation     Adderall     HPI  1) Headache/facial pressure, decreased hearing B, non productive cough, no fever. Taking Tylenol Cold and Cough. 2) ADD - taking Adderall 30 mg once daily. No s/e. Notes decreased efficacy around 3-4 PM. 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 Going to bed 11:30 or 9:30 depending on work schedule. Has never tried taking BID Adderall. Review of Systems   Constitutional: Positive for malaise/fatigue. Negative for fever. HENT: Positive for congestion. Negative for ear pain. Respiratory: Positive for cough. Negative for shortness of breath. Cardiovascular: Negative for chest pain and palpitations. Neurological: Positive for headaches. Endo/Heme/Allergies: Negative for environmental allergies. Psychiatric/Behavioral: The patient is not nervous/anxious and does not have insomnia. Physical Exam   Constitutional: She is oriented to person, place, and time. She appears well-developed and well-nourished. No distress. HENT:   Head: Normocephalic and atraumatic. Mouth/Throat: Oropharynx is clear and moist.   Bilateral TMs with fluid. No erythema. Nasal mucosa red, inflamed. Eyes: Conjunctivae are normal.   Neck: Neck supple. Cardiovascular: Normal rate, regular rhythm and normal heart sounds. Pulmonary/Chest: Effort normal and breath sounds normal.   Lymphadenopathy:     She has no cervical adenopathy. Neurological: She is alert and oriented to person, place, and time. Skin: Skin is warm and dry. Psychiatric: She has a normal mood and affect. Her behavior is normal. Judgment and thought content normal.   Nursing note and vitals reviewed. ASSESSMENT and PLAN    ICD-10-CM ICD-9-CM    1.  Attention deficit disorder, unspecified hyperactivity presence F98.8 314.00 Change to BID dosing: dextroamphetamine-amphetamine (ADDERALL) 20 mg tablet   2.  Acute non-recurrent sinusitis, unspecified location J01.90 461.9 amoxicillin (AMOXIL) 875 mg tablet      guaiFENesin ER (MUCINEX) 600 mg ER tablet

## 2017-12-26 NOTE — PROGRESS NOTES
Chief Complaint   Patient presents with    Sinus Infection     for 1 week    Medication Evaluation     Adderall     1. Have you been to the ER, urgent care clinic since your last visit? Hospitalized since your last visit? No    2. Have you seen or consulted any other health care providers outside of the [address] [place] [address] since your last visit? Include any pap smears or colon screening.  No

## 2017-12-26 NOTE — MR AVS SNAPSHOT
Take 1 Tab (20 mg total) by mouth two (2) times a day. Max Daily Amount: 40 mg  
  
 levothyroxine 175 mcg tablet Commonly known as:  SYNTHROID Take 1 Tab by mouth Daily (before breakfast). MUCINEX 600 mg ER tablet Generic drug:  guaiFENesin ER Take 1 Tab by mouth two (2) times a day. 1-2 tablets 2 times/day. Prescriptions Printed Refills  
 dextroamphetamine-amphetamine (ADDERALL) 20 mg tablet 0 Sig: Take 1 Tab (20 mg total) by mouth two (2) times a day. Max Daily Amount: 40 mg  
 Class: Print Route: Oral  
  
Prescriptions Sent to Pharmacy Refills  
 amoxicillin (AMOXIL) 875 mg tablet 0 Sig: Take 1 Tab by mouth two (2) times a day. Class: Normal  
 Pharmacy: [address] #: [phone] Route: Oral  
  
Introducing [facility] & HEALTH SERVICES! Dear Nancy Bell: Thank you for requesting a TalkApolis account. Our records indicate that you already have an active TalkApolis account. You can access your account anytime at [email or web address] Did you know that you can access your hospital and ER discharge instructions at any time in TalkApolis? You can also review all of your test results from your hospital stay or ER visit. Additional Information If you have questions, please visit the Frequently Asked Questions section of the TalkApolis website at [email or web address] Remember, TalkApolis is NOT to be used for urgent needs. For medical emergencies, dial 911. Now available from your iPhone and Android! Please provide this summary of care documentation to your next provider. Your primary care clinician is listed as Elvira . If you have any questions after today's visit, please call [phone].

## 2018-01-24 DIAGNOSIS — F98.8 ATTENTION DEFICIT DISORDER, UNSPECIFIED HYPERACTIVITY PRESENCE: ICD-10-CM

## 2018-01-24 RX ORDER — DEXTROAMPHETAMINE SACCHARATE, AMPHETAMINE ASPARTATE, DEXTROAMPHETAMINE SULFATE AND AMPHETAMINE SULFATE 5; 5; 5; 5 MG/1; MG/1; MG/1; MG/1
20 TABLET ORAL 2 TIMES DAILY
Qty: 60 TAB | Refills: 0 | Status: SHIPPED | OUTPATIENT
Start: 2018-01-24 | End: 2018-02-20 | Stop reason: SDUPTHER

## 2018-01-24 NOTE — TELEPHONE ENCOUNTER
From: Vania Soares  To: Lori Arguello PA-C  Sent: 1/24/2018 7:53 AM EST  Subject: Medication Renewal Request    Original authorizing provider: KEVIN Han would like a refill of the following medications:  dextroamphetamine-amphetamine (ADDERALL) 20 mg tablet Lori Arguello PA-C]    Preferred pharmacy: [address] [place] [address]    Comment:

## 2018-02-06 DIAGNOSIS — J40 BRONCHITIS: ICD-10-CM

## 2018-02-06 RX ORDER — ALBUTEROL SULFATE 90 UG/1
1 AEROSOL, METERED RESPIRATORY (INHALATION)
Qty: 1 INHALER | Refills: 0 | Status: SHIPPED | OUTPATIENT
Start: 2018-02-06 | End: 2018-08-19

## 2018-02-20 DIAGNOSIS — F98.8 ATTENTION DEFICIT DISORDER, UNSPECIFIED HYPERACTIVITY PRESENCE: ICD-10-CM

## 2018-02-20 DIAGNOSIS — E03.9 HYPOTHYROIDISM, UNSPECIFIED TYPE: ICD-10-CM

## 2018-02-20 RX ORDER — LEVOTHYROXINE SODIUM 175 UG/1
175 TABLET ORAL
Qty: 90 TAB | Refills: 1 | Status: SHIPPED | OUTPATIENT
Start: 2018-02-20 | End: 2021-04-12 | Stop reason: SDUPTHER

## 2018-02-20 RX ORDER — DEXTROAMPHETAMINE SACCHARATE, AMPHETAMINE ASPARTATE, DEXTROAMPHETAMINE SULFATE AND AMPHETAMINE SULFATE 5; 5; 5; 5 MG/1; MG/1; MG/1; MG/1
20 TABLET ORAL 2 TIMES DAILY
Qty: 60 TAB | Refills: 0 | Status: SHIPPED | OUTPATIENT
Start: 2018-02-20 | End: 2018-08-19

## 2018-02-20 NOTE — TELEPHONE ENCOUNTER
From: Cecilia Soares  To: Daron Root PA-C  Sent: 2/20/2018 11:37 AM EST  Subject: Medication Renewal Request    Original authorizing provider: KEVIN Gomeste Mendy Soares would like a refill of the following medications:  levothyroxine (SYNTHROID) 175 mcg tablet Daron Root PA-C]  dextroamphetamine-amphetamine (ADDERALL) 20 mg tablet Daron Root PA-C]    Preferred pharmacy: [address] [place] [address]    Comment:

## 2018-08-19 ENCOUNTER — APPOINTMENT (OUTPATIENT)
Dept: ULTRASOUND IMAGING | Age: 43
End: 2018-08-19
Attending: EMERGENCY MEDICINE
Payer: SELF-PAY

## 2018-08-19 ENCOUNTER — HOSPITAL ENCOUNTER (EMERGENCY)
Age: 43
Discharge: HOME | End: 2018-08-19
Attending: EMERGENCY MEDICINE
Payer: SELF-PAY

## 2018-08-19 VITALS
HEART RATE: 89 BPM | SYSTOLIC BLOOD PRESSURE: 151 MMHG | WEIGHT: 274.25 LBS | BODY MASS INDEX: 45.69 KG/M2 | DIASTOLIC BLOOD PRESSURE: 69 MMHG | OXYGEN SATURATION: 97 % | HEIGHT: 65 IN | RESPIRATION RATE: 18 BRPM | TEMPERATURE: 99.3 F

## 2018-08-19 DIAGNOSIS — R10.84 ABDOMINAL PAIN, GENERALIZED: Primary | ICD-10-CM

## 2018-08-19 DIAGNOSIS — B96.89 BV (BACTERIAL VAGINOSIS): ICD-10-CM

## 2018-08-19 DIAGNOSIS — N73.0 PID (ACUTE PELVIC INFLAMMATORY DISEASE): ICD-10-CM

## 2018-08-19 DIAGNOSIS — N76.0 BV (BACTERIAL VAGINOSIS): ICD-10-CM

## 2018-08-19 LAB
ALBUMIN SERPL-MCNC: 3.5 G/DL (ref 3.5–5)
ALBUMIN/GLOB SERPL: 0.8 {RATIO} (ref 1.1–2.2)
ALP SERPL-CCNC: 60 U/L (ref 45–117)
ALT SERPL-CCNC: 16 U/L (ref 12–78)
ANION GAP SERPL CALC-SCNC: 8 MMOL/L (ref 5–15)
APPEARANCE UR: ABNORMAL
AST SERPL-CCNC: 11 U/L (ref 15–37)
BACTERIA URNS QL MICRO: ABNORMAL /HPF
BASOPHILS # BLD: 0 K/UL (ref 0–0.1)
BASOPHILS NFR BLD: 0 % (ref 0–1)
BILIRUB SERPL-MCNC: 0.6 MG/DL (ref 0.2–1)
BILIRUB UR QL: NEGATIVE
BUN SERPL-MCNC: 12 MG/DL (ref 6–20)
BUN/CREAT SERPL: 12 (ref 12–20)
CALCIUM SERPL-MCNC: 8.2 MG/DL (ref 8.5–10.1)
CHLORIDE SERPL-SCNC: 106 MMOL/L (ref 97–108)
CK MB CFR SERPL CALC: NORMAL % (ref 0–2.5)
CK MB SERPL-MCNC: <1 NG/ML (ref 5–25)
CK SERPL-CCNC: 52 U/L (ref 26–192)
CLUE CELLS VAG QL WET PREP: NORMAL
CO2 SERPL-SCNC: 23 MMOL/L (ref 21–32)
COLOR UR: ABNORMAL
CREAT SERPL-MCNC: 0.97 MG/DL (ref 0.55–1.02)
DIFFERENTIAL METHOD BLD: ABNORMAL
EOSINOPHIL # BLD: 0 K/UL (ref 0–0.4)
EOSINOPHIL NFR BLD: 1 % (ref 0–7)
EPITH CASTS URNS QL MICRO: ABNORMAL /LPF
ERYTHROCYTE [DISTWIDTH] IN BLOOD BY AUTOMATED COUNT: 13.2 % (ref 11.5–14.5)
GLOBULIN SER CALC-MCNC: 4.2 G/DL (ref 2–4)
GLUCOSE SERPL-MCNC: 109 MG/DL (ref 65–100)
GLUCOSE UR STRIP.AUTO-MCNC: NEGATIVE MG/DL
HCG UR QL: NEGATIVE
HCT VFR BLD AUTO: 41.2 % (ref 35–47)
HGB BLD-MCNC: 13.5 G/DL (ref 11.5–16)
HGB UR QL STRIP: ABNORMAL
HYALINE CASTS URNS QL MICRO: ABNORMAL /LPF (ref 0–5)
IMM GRANULOCYTES # BLD: 0 K/UL (ref 0–0.04)
IMM GRANULOCYTES NFR BLD AUTO: 1 % (ref 0–0.5)
KETONES UR QL STRIP.AUTO: NEGATIVE MG/DL
LACTATE SERPL-SCNC: 1.5 MMOL/L (ref 0.4–2)
LEUKOCYTE ESTERASE UR QL STRIP.AUTO: ABNORMAL
LIPASE SERPL-CCNC: 374 U/L (ref 73–393)
LYMPHOCYTES # BLD: 0.6 K/UL (ref 0.8–3.5)
LYMPHOCYTES NFR BLD: 10 % (ref 12–49)
MCH RBC QN AUTO: 29.3 PG (ref 26–34)
MCHC RBC AUTO-ENTMCNC: 32.8 G/DL (ref 30–36.5)
MCV RBC AUTO: 89.6 FL (ref 80–99)
MONOCYTES # BLD: 0.6 K/UL (ref 0–1)
MONOCYTES NFR BLD: 8 % (ref 5–13)
NEUTS SEG # BLD: 5.3 K/UL (ref 1.8–8)
NEUTS SEG NFR BLD: 81 % (ref 32–75)
NITRITE UR QL STRIP.AUTO: NEGATIVE
NRBC # BLD: 0 K/UL (ref 0–0.01)
NRBC BLD-RTO: 0 PER 100 WBC
PH UR STRIP: 5.5 [PH] (ref 5–8)
PLATELET # BLD AUTO: 203 K/UL (ref 150–400)
PMV BLD AUTO: 11.8 FL (ref 8.9–12.9)
POTASSIUM SERPL-SCNC: 4 MMOL/L (ref 3.5–5.1)
PROT SERPL-MCNC: 7.7 G/DL (ref 6.4–8.2)
PROT UR STRIP-MCNC: ABNORMAL MG/DL
RBC # BLD AUTO: 4.6 M/UL (ref 3.8–5.2)
RBC #/AREA URNS HPF: ABNORMAL /HPF (ref 0–5)
SODIUM SERPL-SCNC: 137 MMOL/L (ref 136–145)
SP GR UR REFRACTOMETRY: 1.02 (ref 1–1.03)
T VAGINALIS VAG QL WET PREP: NORMAL
TROPONIN I SERPL-MCNC: <0.05 NG/ML
UROBILINOGEN UR QL STRIP.AUTO: 0.2 EU/DL (ref 0.2–1)
WBC # BLD AUTO: 6.5 K/UL (ref 3.6–11)
WBC URNS QL MICRO: ABNORMAL /HPF (ref 0–4)

## 2018-08-19 PROCEDURE — 81025 URINE PREGNANCY TEST: CPT | Performed by: EMERGENCY MEDICINE

## 2018-08-19 PROCEDURE — 87210 SMEAR WET MOUNT SALINE/INK: CPT | Performed by: EMERGENCY MEDICINE

## 2018-08-19 PROCEDURE — 84484 ASSAY OF TROPONIN QUANT: CPT | Performed by: EMERGENCY MEDICINE

## 2018-08-19 PROCEDURE — 76856 US EXAM PELVIC COMPLETE: CPT

## 2018-08-19 PROCEDURE — 96372 THER/PROPH/DIAG INJ SC/IM: CPT

## 2018-08-19 PROCEDURE — 87491 CHLMYD TRACH DNA AMP PROBE: CPT | Performed by: EMERGENCY MEDICINE

## 2018-08-19 PROCEDURE — 74011000250 HC RX REV CODE- 250: Performed by: EMERGENCY MEDICINE

## 2018-08-19 PROCEDURE — 80053 COMPREHEN METABOLIC PANEL: CPT | Performed by: EMERGENCY MEDICINE

## 2018-08-19 PROCEDURE — 83605 ASSAY OF LACTIC ACID: CPT | Performed by: EMERGENCY MEDICINE

## 2018-08-19 PROCEDURE — 83690 ASSAY OF LIPASE: CPT | Performed by: EMERGENCY MEDICINE

## 2018-08-19 PROCEDURE — 76830 TRANSVAGINAL US NON-OB: CPT

## 2018-08-19 PROCEDURE — 74011250637 HC RX REV CODE- 250/637: Performed by: EMERGENCY MEDICINE

## 2018-08-19 PROCEDURE — 36415 COLL VENOUS BLD VENIPUNCTURE: CPT | Performed by: EMERGENCY MEDICINE

## 2018-08-19 PROCEDURE — 81001 URINALYSIS AUTO W/SCOPE: CPT | Performed by: EMERGENCY MEDICINE

## 2018-08-19 PROCEDURE — 96376 TX/PRO/DX INJ SAME DRUG ADON: CPT

## 2018-08-19 PROCEDURE — 99285 EMERGENCY DEPT VISIT HI MDM: CPT

## 2018-08-19 PROCEDURE — 85025 COMPLETE CBC W/AUTO DIFF WBC: CPT | Performed by: EMERGENCY MEDICINE

## 2018-08-19 PROCEDURE — 96374 THER/PROPH/DIAG INJ IV PUSH: CPT

## 2018-08-19 PROCEDURE — 74011250636 HC RX REV CODE- 250/636

## 2018-08-19 PROCEDURE — 96375 TX/PRO/DX INJ NEW DRUG ADDON: CPT

## 2018-08-19 PROCEDURE — 76705 ECHO EXAM OF ABDOMEN: CPT

## 2018-08-19 PROCEDURE — 82550 ASSAY OF CK (CPK): CPT | Performed by: EMERGENCY MEDICINE

## 2018-08-19 PROCEDURE — 74011250636 HC RX REV CODE- 250/636: Performed by: EMERGENCY MEDICINE

## 2018-08-19 RX ORDER — METRONIDAZOLE 500 MG/1
500 TABLET ORAL 2 TIMES DAILY
Qty: 14 TAB | Refills: 0 | Status: SHIPPED | OUTPATIENT
Start: 2018-08-19 | End: 2018-08-26

## 2018-08-19 RX ORDER — AZITHROMYCIN 250 MG/1
1000 TABLET, FILM COATED ORAL
Status: COMPLETED | OUTPATIENT
Start: 2018-08-19 | End: 2018-08-19

## 2018-08-19 RX ORDER — CEFTRIAXONE 250 MG/8ML
250 INJECTION, POWDER, FOR SOLUTION INTRAMUSCULAR; INTRAVENOUS
Status: DISCONTINUED | OUTPATIENT
Start: 2018-08-19 | End: 2018-08-19

## 2018-08-19 RX ORDER — ONDANSETRON 2 MG/ML
4 INJECTION INTRAMUSCULAR; INTRAVENOUS
Status: COMPLETED | OUTPATIENT
Start: 2018-08-19 | End: 2018-08-19

## 2018-08-19 RX ORDER — LIDOCAINE HYDROCHLORIDE 10 MG/ML
INJECTION, SOLUTION EPIDURAL; INFILTRATION; INTRACAUDAL; PERINEURAL
Status: DISCONTINUED
Start: 2018-08-19 | End: 2018-08-19

## 2018-08-19 RX ORDER — PHENOBARBITAL, HYOSCYAMINE SULFATE, ATROPINE SULFATE AND SCOPOLAMINE HYDROBROMIDE .0194; .1037; 16.2; .0065 MG/1; MG/1; MG/1; MG/1
1 TABLET ORAL
Qty: 20 TAB | Refills: 0 | Status: SHIPPED | OUTPATIENT
Start: 2018-08-19 | End: 2020-12-28 | Stop reason: ALTCHOICE

## 2018-08-19 RX ORDER — TRAMADOL HYDROCHLORIDE 50 MG/1
50 TABLET ORAL
Qty: 20 TAB | Refills: 0 | Status: SHIPPED | OUTPATIENT
Start: 2018-08-19 | End: 2018-08-29

## 2018-08-19 RX ORDER — ONDANSETRON 2 MG/ML
INJECTION INTRAMUSCULAR; INTRAVENOUS
Status: COMPLETED
Start: 2018-08-19 | End: 2018-08-19

## 2018-08-19 RX ORDER — FENTANYL CITRATE 50 UG/ML
50 INJECTION, SOLUTION INTRAMUSCULAR; INTRAVENOUS
Status: COMPLETED | OUTPATIENT
Start: 2018-08-19 | End: 2018-08-19

## 2018-08-19 RX ORDER — FAMOTIDINE 20 MG/1
20 TABLET, FILM COATED ORAL 2 TIMES DAILY
Qty: 20 TAB | Refills: 0 | Status: SHIPPED | OUTPATIENT
Start: 2018-08-19 | End: 2021-06-21

## 2018-08-19 RX ORDER — DOXYCYCLINE HYCLATE 100 MG
100 TABLET ORAL 2 TIMES DAILY
Qty: 14 TAB | Refills: 0 | Status: SHIPPED | OUTPATIENT
Start: 2018-08-19 | End: 2018-08-26

## 2018-08-19 RX ADMIN — ONDANSETRON 4 MG: 2 INJECTION, SOLUTION INTRAMUSCULAR; INTRAVENOUS at 15:05

## 2018-08-19 RX ADMIN — FENTANYL CITRATE 50 MCG: 50 INJECTION, SOLUTION INTRAMUSCULAR; INTRAVENOUS at 19:04

## 2018-08-19 RX ADMIN — ONDANSETRON 4 MG: 2 INJECTION, SOLUTION INTRAMUSCULAR; INTRAVENOUS at 19:05

## 2018-08-19 RX ADMIN — AZITHROMYCIN 1000 MG: 250 TABLET, FILM COATED ORAL at 18:46

## 2018-08-19 RX ADMIN — ONDANSETRON 4 MG: 2 INJECTION INTRAMUSCULAR; INTRAVENOUS at 19:05

## 2018-08-19 RX ADMIN — FENTANYL CITRATE 50 MCG: 50 INJECTION, SOLUTION INTRAMUSCULAR; INTRAVENOUS at 15:05

## 2018-08-19 RX ADMIN — FAMOTIDINE 20 MG: 10 INJECTION, SOLUTION INTRAVENOUS at 15:05

## 2018-08-19 RX ADMIN — LIDOCAINE HYDROCHLORIDE 250 MG: 10 INJECTION, SOLUTION EPIDURAL; INFILTRATION; INTRACAUDAL; PERINEURAL at 17:33

## 2018-08-19 RX ADMIN — LIDOCAINE HYDROCHLORIDE 40 ML: 20 SOLUTION ORAL; TOPICAL at 19:04

## 2018-08-19 NOTE — DISCHARGE INSTRUCTIONS
Abdominal Pain: Care Instructions  Your Care Instructions    Abdominal pain has many possible causes. Some aren't serious and get better on their own in a few days. Others need more testing and treatment. If your pain continues or gets worse, you need to be rechecked and may need more tests to find out what is wrong. You may need surgery to correct the problem. Don't ignore new symptoms, such as fever, nausea and vomiting, urination problems, pain that gets worse, and dizziness. These may be signs of a more serious problem. Your doctor may have recommended a follow-up visit in the next 8 to 12 hours. If you are not getting better, you may need more tests or treatment. The doctor has checked you carefully, but problems can develop later. If you notice any problems or new symptoms, get medical treatment right away. Follow-up care is a key part of your treatment and safety. Be sure to make and go to all appointments, and call your doctor if you are having problems. It's also a good idea to know your test results and keep a list of the medicines you take. How can you care for yourself at home? · Rest until you feel better. · To prevent dehydration, drink plenty of fluids, enough so that your urine is light yellow or clear like water. Choose water and other caffeine-free clear liquids until you feel better. If you have kidney, heart, or liver disease and have to limit fluids, talk with your doctor before you increase the amount of fluids you drink. · If your stomach is upset, eat mild foods, such as rice, dry toast or crackers, bananas, and applesauce. Try eating several small meals instead of two or three large ones. · Wait until 48 hours after all symptoms have gone away before you have spicy foods, alcohol, and drinks that contain caffeine. · Do not eat foods that are high in fat. · Avoid anti-inflammatory medicines such as aspirin, ibuprofen (Advil, Motrin), and naproxen (Aleve).  These can cause stomach upset. Talk to your doctor if you take daily aspirin for another health problem. When should you call for help? Call 911 anytime you think you may need emergency care. For example, call if:    · You passed out (lost consciousness).     · You pass maroon or very bloody stools.     · You vomit blood or what looks like coffee grounds.     · You have new, severe belly pain.    Call your doctor now or seek immediate medical care if:    · Your pain gets worse, especially if it becomes focused in one area of your belly.     · You have a new or higher fever.     · Your stools are black and look like tar, or they have streaks of blood.     · You have unexpected vaginal bleeding.     · You have symptoms of a urinary tract infection. These may include:  ¨ Pain when you urinate. ¨ Urinating more often than usual.  ¨ Blood in your urine.     · You are dizzy or lightheaded, or you feel like you may faint.    Watch closely for changes in your health, and be sure to contact your doctor if:    · You are not getting better after 1 day (24 hours). Where can you learn more? Go to http://harjit-alfredo.info/. Enter Y513 in the search box to learn more about \"Abdominal Pain: Care Instructions. \"  Current as of: November 20,      Pelvic Inflammatory Disease: Care Instructions  Your Care Instructions    Pelvic inflammatory disease, or PID, is an infection of a woman's fallopian tubes and other reproductive organs. PID is usually caused by a sexually transmitted infection (STI), such as gonorrhea or chlamydia. PID can cause scars in the fallopian tubes, making it hard for a woman to get pregnant. Having one STI increases your risk for other STIs, such as genital herpes, genital warts, syphilis, and HIV. It is important to take all the medicine that was prescribed. PID can cause serious health problems if you do not complete your treatment. Follow-up care is a key part of your treatment and safety.  Be sure to make and go to all appointments, and call your doctor if you are having problems. It's also a good idea to know your test results and keep a list of the medicines you take. How can you care for yourself at home? · Take your antibiotics as directed. Do not stop taking them just because you feel better. You need to take the full course of antibiotics. · Rest until your fever and pain have improved. · Take pain medicines exactly as directed. ¨ If the doctor gave you a prescription medicine for pain, take it as prescribed. ¨ If you are not taking a prescription pain medicine, ask your doctor if you can take an over-the-counter medicine. · Use a hot water bottle or a heating pad (set on low) on your belly for pain. · Do not douche. · Do not have sex or use tampons (you can use pads instead) until you have taken all the medicine, your pain is gone, and you feel completely well. · Talk to any sex partners you have had in the past 2 months. They need to be tested and may need to be treated for STIs. To prevent STIs  · Use latex condoms every time you have sex. Use them from the beginning to the end of sexual contact. · Talk to your partner before you have sex. Find out if he or she has or is at risk for any sexually transmitted infection (STI). Keep in mind that a person may be able to spread an STI even if he or she does not have symptoms. · Do not have sex with anyone who has symptoms of an STI, such as sores on the genitals or mouth. · Having one sex partner (who does not have STIs and does not have sex with anyone else) is a good way to avoid STIs. When should you call for help?   Call your doctor now or seek immediate medical care if:    · You have a new or higher fever.     · You have unusual vaginal bleeding.     · You have new or worse belly or pelvic pain.     · You have vaginal discharge that has increased in amount or smells bad.    Watch closely for changes in your health, and be sure to contact your doctor if:    · You do not get better as expected. Where can you learn more? Go to http://harjit-alfredo.info/. Enter N294 in the search box to learn more about \"Pelvic Inflammatory Disease: Care Instructions. \"  Current as of: October 6, 2017  Content Version: 11.7  © 7163-6134 [employer] Care instructions adapted under license by [employer] (which disclaims liability or warranty for this information). If you have questions about a medical condition or this instruction, always ask your healthcare professional. [address] any warranty or liability for your use of this information. on.         Bacterial Vaginosis: Care Instructions  Your Care Instructions    Bacterial vaginosis is a type of vaginal infection. It is caused by excess growth of certain bacteria that are normally found in the vagina. Symptoms can include itching, swelling, pain when you urinate or have sex, and a gray or yellow discharge with a \"fishy\" odor. It is not considered an infection that is spread through sexual contact. Although symptoms can be annoying and uncomfortable, bacterial vaginosis does not usually cause other health problems. However, if you have it while you are pregnant, it can cause complications. While the infection may go away on its own, most doctors use antibiotics to treat it. You may have been prescribed pills or vaginal cream. With treatment, bacterial vaginosis usually clears up in 5 to 7 days. Follow-up care is a key part of your treatment and safety. Be sure to make and go to all appointments, and call your doctor if you are having problems. It's also a good idea to know your test results and keep a list of the medicines you take. How can you care for yourself at home? · Take your antibiotics as directed. Do not stop taking them just because you feel better. You need to take the full course of antibiotics.   · Do not eat or drink anything that contains alcohol if you are taking metronidazole (Flagyl). · Keep using your medicine if you start your period. Use pads instead of tampons while using a vaginal cream or suppository. Tampons can absorb the medicine. · Wear loose cotton clothing. Do not wear nylon and other materials that hold body heat and moisture close to the skin. · Do not scratch. Relieve itching with a cold pack or a cool bath. · Do not wash your vaginal area more than once a day. Use plain water or a mild, unscented soap. Do not douche. When should you call for help? Watch closely for changes in your health, and be sure to contact your doctor if:    · You have unexpected vaginal bleeding.     · You have a fever.     · You have new or increased pain in your vagina or pelvis.     · You are not getting better after 1 week.     · Your symptoms return after you finish the course of your medicine. Where can you learn more? Go to http://harjit1006.tv.info/. Verlene Sacks in the search box to learn more about \"Bacterial Vaginosis: Care Instructions. \"  Current as of: October 6, 2017  Content Version: 11.7  © 4764-5072 [employer]. Care instructions adapted under license by [employer] (which disclaims liability or warranty for this information). If you have questions about a medical condition or this instruction, always ask your healthcare professional. [address] any warranty or liability for your use of this information. Thank you! Thank you for allowing us to provide you with excellent care today. We hope we addressed all of your concerns and needs. We strive to provide excellent quality care in the Emergency Department. You may receive a survey after your visit to evaluate the care you were provided. Should you receive a survey from us, we invite you to share your experience and tell us what made it excellent.     It was a pleasure serving you, we invite you to share your experience with us, in our pursuit for excellence, should you be selected to receive a survey. If you feel that you have not received excellent quality care or timely care, please ask to speak to the nurse manager. Please choose us in the future for your continued health care needs. ------------------------------------------------------------------------------------------------------------  The exam and treatment you received in the Emergency Department were for an urgent problem and are not intended as complete care. It is important that you follow up with a doctor, nurse practitioner, or physician assistant for ongoing care. If your symptoms become worse or you do not improve as expected and you are unable to reach your usual health care provider, you should return to the Emergency Department. We are available 24 hours a day. Please take your discharge instructions with you when you go to your follow-up appointment. If you have any problem arranging a follow-up appointment, contact the Emergency Department immediately. If a prescription has been provided, please have it filled as soon as possible to prevent a delay in treatment. Read the entire medication instruction sheet provided to you by the pharmacy. If you have any questions or reservations about taking the medication due to side effects or interactions with other medications, please call your primary care physician or contact the ER to speak with the charge nurse. Make an appointment with your family doctor or the physician you were referred to for follow-up of this visit as instructed on your discharge paperwork, as this is mandatory follow-up. Return to the ER if you are unable to be seen or if you are unable to be seen in a timely manner. If you have any problem arranging the follow-up visit, contact the Emergency Department immediately.

## 2018-08-19 NOTE — ED PROVIDER NOTES
EMERGENCY DEPARTMENT HISTORY AND PHYSICAL EXAM      Date: 8/19/2018  Patient Name: Darío Hussein    History of Presenting Illness     Chief Complaint   Patient presents with    Abdominal Pain     low abdominal pain x two days with vomiting       History Provided By: Patient    HPI: Darío Hussein, 39 y.o. female with PMHx significant for hypothyroidism, carpal tunnel syndrome,  H. Pylori, sleep apnea, HTN, anxiety, ADD, DJD who presents ambulatory to the ED with cc of intermittent 1-/10 pelvic pain that radiates to her back that onset yesterday. Pt reports associated burning sensation that radiates up from her lower abdomen, 5/10 tightening epigastric abdominal pain, nausea, vomiting, and mild white normal appearing discharge. She notes that it was difficult for her to vomiting and that she had a lot of dry heaves. Pt reports taking a Prilosec today with little relief of her symptoms. Pt states that her symptoms are exacerbated with standing or sitting up right. She [facility] and [facility] a [facility]. Pt denies a hx of abdominal surgeries. She denies any fevers, chills, CP, SOB, vaginal bleeding, diarrhea, constipation or dysuria. There are no other complaints, changes, or physical findings at this time. PCP: Renee Brantley PA-C    Current Facility-Administered Medications   Medication Dose Route Frequency Provider Last Rate Last Dose    azithromycin (ZITHROMAX) tablet 1,000 mg  1,000 mg Oral NOW Aurelia Shane MD        fentaNYL citrate (PF) injection 50 mcg  50 mcg IntraVENous NOW Aurelia Shane MD        ondansetron [facility]) injection 4 mg  4 mg IntraVENous NOW Aurelia Shane MD        mylanta/viscous lidocaine (BILLY)(GI COCKTAIL)  40 mL Oral NOW Aurelia Shane MD         Current Outpatient Prescriptions   Medication Sig Dispense Refill    metroNIDAZOLE (FLAGYL) 500 mg tablet Take 1 Tab by mouth two (2) times a day for 7 days.  14 Tab 0    doxycycline (VIBRA-TABS) 100 mg tablet Take 1 Tab by mouth two (2) times a day for 7 days. 14 Tab 0    traMADol (ULTRAM) 50 mg tablet Take 1 Tab by mouth every six (6) hours as needed for Pain for up to 10 days. Max Daily Amount: 200 mg. 20 Tab 0    levothyroxine (SYNTHROID) 175 mcg tablet Take 1 Tab by mouth Daily (before breakfast). 80 Tab 1       Past History     Past Medical History:  Past Medical History:   Diagnosis Date    ADD (attention deficit disorder)     Anxiety     Carpal tunnel syndrome     left    DJD (degenerative joint disease)     Endocrine disease     hypothyroidism    H. PYLORI INFECTION     treated with triple therapy in Fall 2008    Hypertension     Sleep apnea     Unspecified hypothyroidism        Past Surgical History:  Past Surgical History:   Procedure Laterality Date    EXCIS BARTHOLIN GLAND/CYST      HX TUBAL LIGATION         Family History:  Family History   Problem Relation Age of Onset    Hypertension Mother     Hypertension Father     Stroke Father      age 79    Thyroid Disease Sister      hypothyroidism    Arthritis-rheumatoid Sister        Social History:  Social History   Substance Use Topics    Smoking status: Never Smoker    Smokeless tobacco: Never Used    Alcohol use No       Allergies: Allergies   Allergen Reactions    Darvocet A500 [Propoxyphene N-Acetaminophen] Hives         Review of Systems   Review of Systems   Constitutional: Negative for fever. HENT: Negative for congestion. Eyes: Negative for visual disturbance. Respiratory: Negative for shortness of breath. Cardiovascular: Negative for chest pain. Gastrointestinal: Positive for abdominal pain (lower, epigastric), nausea and vomiting. Negative for constipation and diarrhea. Endocrine: Negative for heat intolerance. Genitourinary: Positive for pelvic pain and vaginal discharge. Negative for dysuria and vaginal bleeding. Musculoskeletal: Positive for back pain.    Skin: Negative for rash. Allergic/Immunologic: Negative for immunocompromised state. Neurological: Negative for dizziness. Hematological: Does not bruise/bleed easily. Psychiatric/Behavioral: Negative. All other systems reviewed and are negative. Physical Exam   Physical Exam   Constitutional: She is oriented to person, place, and time. She appears well-developed and well-nourished. No distress. Increased BMI  NAD   HENT:   Head: Normocephalic and atraumatic. Eyes: EOM are normal. Pupils are equal, round, and reactive to light. Neck: Normal range of motion. Neck supple. Cardiovascular: Normal rate, regular rhythm and normal heart sounds. Pulmonary/Chest: Effort normal and breath sounds normal. No respiratory distress. Abdominal: Soft. Bowel sounds are normal. She exhibits no mass. There is tenderness in the right lower quadrant, epigastric area and left lower quadrant. Genitourinary:   Genitourinary Comments: White discharge, bilateral adnexal tenderness and CMT tenderness. Musculoskeletal: Normal range of motion. She exhibits no edema. Neurological: She is alert and oriented to person, place, and time. Coordination normal.   Skin: Skin is warm and dry. Psychiatric: She has a normal mood and affect. Her behavior is normal.   Nursing note and vitals reviewed.       Diagnostic Study Results     Labs -     Recent Results (from the past 12 hour(s))   CBC WITH AUTOMATED DIFF    Collection Time: 08/19/18  2:39 PM   Result Value Ref Range    WBC 6.5 3.6 - 11.0 K/uL    RBC 4.60 3.80 - 5.20 M/uL    HGB 13.5 11.5 - 16.0 g/dL    HCT 41.2 35.0 - 47.0 %    MCV 89.6 80.0 - 99.0 FL    MCH 29.3 26.0 - 34.0 PG    MCHC 32.8 30.0 - 36.5 g/dL    RDW 13.2 11.5 - 14.5 %    PLATELET 069 832 - 061 K/uL    MPV 11.8 8.9 - 12.9 FL    NRBC 0.0 0  WBC    ABSOLUTE NRBC 0.00 0.00 - 0.01 K/uL    NEUTROPHILS 81 (H) 32 - 75 %    LYMPHOCYTES 10 (L) 12 - 49 %    MONOCYTES 8 5 - 13 %    EOSINOPHILS 1 0 - 7 %    BASOPHILS 0 0 - 1 %    IMMATURE GRANULOCYTES 1 (H) 0.0 - 0.5 %    ABS. NEUTROPHILS 5.3 1.8 - 8.0 K/UL    ABS. LYMPHOCYTES 0.6 (L) 0.8 - 3.5 K/UL    ABS. MONOCYTES 0.6 0.0 - 1.0 K/UL    ABS. EOSINOPHILS 0.0 0.0 - 0.4 K/UL    ABS. BASOPHILS 0.0 0.0 - 0.1 K/UL    ABS. IMM. GRANS. 0.0 0.00 - 0.04 K/UL    DF AUTOMATED     METABOLIC PANEL, COMPREHENSIVE    Collection Time: 08/19/18  2:39 PM   Result Value Ref Range    Sodium 137 136 - 145 mmol/L    Potassium 4.0 3.5 - 5.1 mmol/L    Chloride 106 97 - 108 mmol/L    CO2 23 21 - 32 mmol/L    Anion gap 8 5 - 15 mmol/L    Glucose 109 (H) 65 - 100 mg/dL    BUN 12 6 - 20 MG/DL    Creatinine 0.97 0.55 - 1.02 MG/DL    BUN/Creatinine ratio 12 12 - 20      GFR est AA >60 >60 ml/min/1.73m2    GFR est non-AA >60 >60 ml/min/1.73m2    Calcium 8.2 (L) 8.5 - 10.1 MG/DL    Bilirubin, total 0.6 0.2 - 1.0 MG/DL    ALT (SGPT) 16 12 - 78 U/L    AST (SGOT) 11 (L) 15 - 37 U/L    Alk.  phosphatase 60 45 - 117 U/L    Protein, total 7.7 6.4 - 8.2 g/dL    Albumin 3.5 3.5 - 5.0 g/dL    Globulin 4.2 (H) 2.0 - 4.0 g/dL    A-G Ratio 0.8 (L) 1.1 - 2.2     LIPASE    Collection Time: 08/19/18  2:39 PM   Result Value Ref Range    Lipase 374 73 - 393 U/L   URINALYSIS W/ RFLX MICROSCOPIC    Collection Time: 08/19/18  2:39 PM   Result Value Ref Range    Color YELLOW/STRAW      Appearance CLOUDY (A) CLEAR      Specific gravity 1.025 1.003 - 1.030      pH (UA) 5.5 5.0 - 8.0      Protein TRACE (A) NEG mg/dL    Glucose NEGATIVE  NEG mg/dL    Ketone NEGATIVE  NEG mg/dL    Bilirubin NEGATIVE  NEG      Blood TRACE (A) NEG      Urobilinogen 0.2 0.2 - 1.0 EU/dL    Nitrites NEGATIVE  NEG      Leukocyte Esterase SMALL (A) NEG      WBC 0-4 0 - 4 /hpf    RBC 0-5 0 - 5 /hpf    Epithelial cells MANY (A) FEW /lpf    Bacteria 1+ (A) NEG /hpf    Hyaline cast 0-2 0 - 5 /lpf   CK W/ CKMB & INDEX    Collection Time: 08/19/18  2:39 PM   Result Value Ref Range    CK 52 26 - 192 U/L    CK - MB <1.0 <3.6 NG/ML    CK-MB Index Cannot be calculated 0 - 2. 5     TROPONIN I    Collection Time: 08/19/18  2:39 PM   Result Value Ref Range    Troponin-I, Qt. <0.05 <0.05 ng/mL   LACTIC ACID    Collection Time: 08/19/18  2:39 PM   Result Value Ref Range    Lactic acid 1.5 0.4 - 2.0 MMOL/L   HCG URINE, QL    Collection Time: 08/19/18  2:39 PM   Result Value Ref Range    HCG urine, QL NEGATIVE  NEG     WET PREP    Collection Time: 08/19/18  4:11 PM   Result Value Ref Range    Clue cells CLUE CELLS PRESENT      Wet prep NO TRICHOMONAS SEEN         Radiologic Studies -   US PELV NON OBS   Final Result   EXAM:  ULTRASOUND FEMALE PELVIS TRANSABDOMINAL AND TRANSVAGINAL     TRANSABDOMINAL      INDICATION: Pelvic pain.     COMPARISON: None.     TECHNIQUE:  Real-time sonography of the pelvis was performed. . Multiple static images of the  uterus and ovaries were obtained.     FINDINGS:  UTERUS:  The uterus is normal in size and echotexture and measures 9.7 x 4.2 x 6 cm.     ENDOMETRIUM:  The endometrial stripe measures 8 mm.      RIGHT OVARY:  The right ovary is obscured by bowel gas     LEFT OVARY:  The left ovary measures 3.2 x 1.6 x 1.6 cm and flow is identified.     CUL-DE-SAC:  There is no mass or fluid or other abnormality in the adnexa or cul-de-sac.     IMPRESSION  IMPRESSION:  Uterus and left ovary are within normal limits. Right ovary is  nonvisualized. Transvaginal sonography will be performed to better evaluate. Please see separate report.     TRANSVAGINAL     INDICATION: Pain.     COMPARISON: None.     TECHNIQUE: Transvaginal sonography was performed with multiple static images of  the uterus and ovariesobtained.      FINDINGS:      UTERUS:  The uterus is normal. Uterus measures 10 x 4.8 x 5.4 cm. .      ENDOMETRIUM:  The endometrial stripe measures 10mm.     RIGHT OVARY:  The right ovary is normal. The right ovary measures 2.8 x 1 0.8, 1.5 cm.   There  is normal color flow to the right ovary.     LEFT OVARY:  The left ovary is normal. The left ovary measures 1.8 x 1.7 x 3. 2 cm. There is  normal color flow to the left ovary.     CUL-DE-SAC:  There is no fluid or mass or other abnormality in the pelvic cul-de-sac.     IMPRESSION: Within normal limits      US TRANSVAGINAL   Final Result   EXAM:  ULTRASOUND FEMALE PELVIS TRANSABDOMINAL AND TRANSVAGINAL     TRANSABDOMINAL      INDICATION: Pelvic pain.     COMPARISON: None.     TECHNIQUE:  Real-time sonography of the pelvis was performed. . Multiple static images of the  uterus and ovaries were obtained.     FINDINGS:  UTERUS:  The uterus is normal in size and echotexture and measures 9.7 x 4.2 x 6 cm.     ENDOMETRIUM:  The endometrial stripe measures 8 mm.      RIGHT OVARY:  The right ovary is obscured by bowel gas     LEFT OVARY:  The left ovary measures 3.2 x 1.6 x 1.6 cm and flow is identified.     CUL-DE-SAC:  There is no mass or fluid or other abnormality in the adnexa or cul-de-sac.     IMPRESSION  IMPRESSION:  Uterus and left ovary are within normal limits. Right ovary is  nonvisualized. Transvaginal sonography will be performed to better evaluate. Please see separate report.     TRANSVAGINAL     INDICATION: Pain.     COMPARISON: None.     TECHNIQUE: Transvaginal sonography was performed with multiple static images of  the uterus and ovariesobtained.      FINDINGS:      UTERUS:  The uterus is normal. Uterus measures 10 x 4.8 x 5.4 cm. .      ENDOMETRIUM:  The endometrial stripe measures 10mm.     RIGHT OVARY:  The right ovary is normal. The right ovary measures 2.8 x 1 0.8, 1.5 cm. There  is normal color flow to the right ovary.     LEFT OVARY:  The left ovary is normal. The left ovary measures 1.8 x 1.7 x 3.2 cm.  There is  normal color flow to the left ovary.     CUL-DE-SAC:  There is no fluid or mass or other abnormality in the pelvic cul-de-sac.     IMPRESSION: Within normal limits   US ABD LTD   Final Result   Indication: Abdominal pain.     Right upper quadrant ultrasound was performed.     Liver is mildly increased in echogenicity.      Gallbladder is partly contracted without definite evidence of gallstones.     Common duct is normal.     Pancreas is obscured by bowel gas.     Right kidney is normal.     Study is limited by patient's body habitus.     IMPRESSION  IMPRESSION:  1. Liver is mildly increased in echogenicity may represent hepatic steatosis. 2. Gallbladder is partly contracted. There is no definite evidence of  gallstones. No ductal dilatation is identified.            Medical Decision Making   I am the first provider for this patient. I reviewed the vital signs, available nursing notes, past medical history, past surgical history, family history and social history. Vital Signs-Reviewed the patient's vital signs. Patient Vitals for the past 12 hrs:   Temp Pulse Resp BP SpO2   08/19/18 1815 - - - (!) 162/98 95 %   08/19/18 1800 - - - 118/54 95 %   08/19/18 1745 - - - 128/55 95 %   08/19/18 1545 - - - 125/69 93 %   08/19/18 1530 - - - 116/48 95 %   08/19/18 1515 - - - 128/76 94 %   08/19/18 1500 - - - 128/67 97 %   08/19/18 1445 - - - 122/56 95 %   08/19/18 1415 - - - (!) 134/98 96 %   08/19/18 1339 99.3 °F (37.4 °C) 89 18 162/89 99 %     Records Reviewed: Nursing Notes and Old Medical Records    Provider Notes (Medical Decision Making):     PUD, gastritis, reflux, pancreatitis, biliary colic, UTI, vaginitis, ovarian cyst, PID, appendicitis, diverticulitis    ED Course:   Initial assessment performed. The patients presenting problems have been discussed, and they are in agreement with the care plan formulated and outlined with them. I have encouraged them to ask questions as they arise throughout their visit. Procedure Note - Pelvic Exam:    3:50 PM  Performed by: Deep Hein MD  Chaperoned by: Shubham Byrd  Pelvic exam was performed using bimanual and speculum. Further findings noted in physical exam.   The procedure took 1-15 minutes, and pt tolerated well.     PROGRESS NOTE:  3:52 PM  Pt has been re-evaluated. Pt states that her upper and lower abdominal pain have resolved. She states that adnexal and CMT pain she had on the pelvic exam are similar to her intermittent pelvic pain. Will treat for PID. Written by JELANI Nguyễn, as dictated by Heena Mckinney MD    PROGRESS NOTE:  6:44 PM  Pt has been re-evaluated. Pt states that her epigastric abdominal pain has returned. Will give GI cocktail and have pt follow up with GI. Written by JELANI Nguyễn, as dictated by Heena Mckinney MD    Disposition:    DISCHARGE NOTE  6:17 PM  The patient has been re-evaluated and is ready for discharge. Reviewed available results with patient. Counseled patient on diagnosis and care plan. Patient has expressed understanding, and all questions have been answered. Patient agrees with plan and agrees to follow up as recommended, or return to the ED if their symptoms worsen. Discharge instructions have been provided and explained to the patient, along with reasons to return to the ED. PLAN:  1. Current Discharge Medication List      START taking these medications    Details   metroNIDAZOLE (FLAGYL) 500 mg tablet Take 1 Tab by mouth two (2) times a day for 7 days. Qty: 14 Tab, Refills: 0      doxycycline (VIBRA-TABS) 100 mg tablet Take 1 Tab by mouth two (2) times a day for 7 days. Qty: 14 Tab, Refills: 0      traMADol (ULTRAM) 50 mg tablet Take 1 Tab by mouth every six (6) hours as needed for Pain for up to 10 days. Max Daily Amount: 200 mg. Qty: 20 Tab, Refills: 0    Associated Diagnoses: Abdominal pain, generalized; PID (acute pelvic inflammatory disease)           2.    Follow-up Information     Follow up With Details Comments Contact Info    Willie Guzmán PA-C In 2 days As needed [address]  [phone]      [facility] EMERGENCY DEPT  If symptoms worsen [address] [place] [address]  [phone]        Return to ED if worse     Diagnosis     Clinical Impression:   1. Abdominal pain, generalized    2. PID (acute pelvic inflammatory disease)    3. BV (bacterial vaginosis)        Attestations: This note is prepared by Dave Burciaga, acting as Scribe for Heena Mckinney MD.    Heena Mckinney MD: The scribe's documentation has been prepared under my direction and personally reviewed by me in its entirety. I confirm that the note above accurately reflects all work, treatment, procedures, and medical decision making performed by me.

## 2018-08-19 NOTE — ED NOTES
Pt discharged by Leela Freeman MD. Pt provided with discharge instructions Rx and instructions on follow up care. Pt. Has no further questions at this time. Pt. Out of ED being driven home by friend.

## 2018-08-20 LAB
C TRACH DNA SPEC QL NAA+PROBE: NEGATIVE
N GONORRHOEA DNA SPEC QL NAA+PROBE: NEGATIVE
SAMPLE TYPE: NORMAL
SERVICE CMNT-IMP: NORMAL
SPECIMEN SOURCE: NORMAL

## 2018-12-10 ENCOUNTER — APPOINTMENT (OUTPATIENT)
Dept: CT IMAGING | Age: 43
End: 2018-12-10
Attending: PHYSICIAN ASSISTANT
Payer: SELF-PAY

## 2018-12-10 ENCOUNTER — HOSPITAL ENCOUNTER (EMERGENCY)
Age: 43
Discharge: HOME | End: 2018-12-10
Attending: EMERGENCY MEDICINE
Payer: SELF-PAY

## 2018-12-10 VITALS
TEMPERATURE: 98.8 F | RESPIRATION RATE: 18 BRPM | DIASTOLIC BLOOD PRESSURE: 62 MMHG | HEIGHT: 65 IN | SYSTOLIC BLOOD PRESSURE: 126 MMHG | BODY MASS INDEX: 44.4 KG/M2 | HEART RATE: 80 BPM | WEIGHT: 266.5 LBS | OXYGEN SATURATION: 99 %

## 2018-12-10 DIAGNOSIS — B96.89 BV (BACTERIAL VAGINOSIS): Primary | ICD-10-CM

## 2018-12-10 DIAGNOSIS — N73.0 PID (ACUTE PELVIC INFLAMMATORY DISEASE): ICD-10-CM

## 2018-12-10 DIAGNOSIS — N76.0 BV (BACTERIAL VAGINOSIS): Primary | ICD-10-CM

## 2018-12-10 LAB
ALBUMIN SERPL-MCNC: 3.7 G/DL (ref 3.5–5)
ALBUMIN/GLOB SERPL: 0.9 {RATIO} (ref 1.1–2.2)
ALP SERPL-CCNC: 59 U/L (ref 45–117)
ALT SERPL-CCNC: 20 U/L (ref 12–78)
ANION GAP SERPL CALC-SCNC: 10 MMOL/L (ref 5–15)
APPEARANCE UR: CLEAR
AST SERPL-CCNC: 22 U/L (ref 15–37)
BACTERIA URNS QL MICRO: NEGATIVE /HPF
BASOPHILS # BLD: 0 K/UL (ref 0–0.1)
BASOPHILS NFR BLD: 1 % (ref 0–1)
BILIRUB SERPL-MCNC: 0.3 MG/DL (ref 0.2–1)
BILIRUB UR QL: NEGATIVE
BUN SERPL-MCNC: 11 MG/DL (ref 6–20)
BUN/CREAT SERPL: 12 (ref 12–20)
CALCIUM SERPL-MCNC: 8.5 MG/DL (ref 8.5–10.1)
CHLORIDE SERPL-SCNC: 105 MMOL/L (ref 97–108)
CLUE CELLS VAG QL WET PREP: NORMAL
CO2 SERPL-SCNC: 27 MMOL/L (ref 21–32)
COLOR UR: ABNORMAL
CREAT SERPL-MCNC: 0.92 MG/DL (ref 0.55–1.02)
DIFFERENTIAL METHOD BLD: ABNORMAL
EOSINOPHIL # BLD: 0.1 K/UL (ref 0–0.4)
EOSINOPHIL NFR BLD: 1 % (ref 0–7)
EPITH CASTS URNS QL MICRO: ABNORMAL /LPF
ERYTHROCYTE [DISTWIDTH] IN BLOOD BY AUTOMATED COUNT: 12.5 % (ref 11.5–14.5)
GLOBULIN SER CALC-MCNC: 4.3 G/DL (ref 2–4)
GLUCOSE SERPL-MCNC: 90 MG/DL (ref 65–100)
GLUCOSE UR STRIP.AUTO-MCNC: NEGATIVE MG/DL
HCG UR QL: NEGATIVE
HCT VFR BLD AUTO: 42.5 % (ref 35–47)
HGB BLD-MCNC: 13.8 G/DL (ref 11.5–16)
HGB UR QL STRIP: NEGATIVE
IMM GRANULOCYTES # BLD: 0 K/UL (ref 0–0.04)
IMM GRANULOCYTES NFR BLD AUTO: 0 % (ref 0–0.5)
KETONES UR QL STRIP.AUTO: NEGATIVE MG/DL
KOH PREP SPEC: NORMAL
LEUKOCYTE ESTERASE UR QL STRIP.AUTO: NEGATIVE
LYMPHOCYTES # BLD: 2.1 K/UL (ref 0.8–3.5)
LYMPHOCYTES NFR BLD: 26 % (ref 12–49)
MCH RBC QN AUTO: 29.4 PG (ref 26–34)
MCHC RBC AUTO-ENTMCNC: 32.5 G/DL (ref 30–36.5)
MCV RBC AUTO: 90.4 FL (ref 80–99)
MONOCYTES # BLD: 1.1 K/UL (ref 0–1)
MONOCYTES NFR BLD: 13 % (ref 5–13)
NEUTS SEG # BLD: 4.8 K/UL (ref 1.8–8)
NEUTS SEG NFR BLD: 60 % (ref 32–75)
NITRITE UR QL STRIP.AUTO: NEGATIVE
NRBC # BLD: 0 K/UL (ref 0–0.01)
NRBC BLD-RTO: 0 PER 100 WBC
PH UR STRIP: 6 [PH] (ref 5–8)
PLATELET # BLD AUTO: 223 K/UL (ref 150–400)
PMV BLD AUTO: 11.6 FL (ref 8.9–12.9)
POTASSIUM SERPL-SCNC: 3.6 MMOL/L (ref 3.5–5.1)
PROT SERPL-MCNC: 8 G/DL (ref 6.4–8.2)
PROT UR STRIP-MCNC: NEGATIVE MG/DL
RBC # BLD AUTO: 4.7 M/UL (ref 3.8–5.2)
RBC #/AREA URNS HPF: ABNORMAL /HPF (ref 0–5)
SERVICE CMNT-IMP: NORMAL
SODIUM SERPL-SCNC: 142 MMOL/L (ref 136–145)
SP GR UR REFRACTOMETRY: >1.03 (ref 1–1.03)
T VAGINALIS VAG QL WET PREP: NORMAL
UA: UC IF INDICATED,UAUC: ABNORMAL
UROBILINOGEN UR QL STRIP.AUTO: 0.2 EU/DL (ref 0.2–1)
WBC # BLD AUTO: 8 K/UL (ref 3.6–11)
WBC URNS QL MICRO: ABNORMAL /HPF (ref 0–4)

## 2018-12-10 PROCEDURE — 80053 COMPREHEN METABOLIC PANEL: CPT

## 2018-12-10 PROCEDURE — 96374 THER/PROPH/DIAG INJ IV PUSH: CPT

## 2018-12-10 PROCEDURE — 85025 COMPLETE CBC W/AUTO DIFF WBC: CPT

## 2018-12-10 PROCEDURE — 74011250636 HC RX REV CODE- 250/636: Performed by: PHYSICIAN ASSISTANT

## 2018-12-10 PROCEDURE — 99284 EMERGENCY DEPT VISIT MOD MDM: CPT

## 2018-12-10 PROCEDURE — 87210 SMEAR WET MOUNT SALINE/INK: CPT

## 2018-12-10 PROCEDURE — 74011636320 HC RX REV CODE- 636/320: Performed by: EMERGENCY MEDICINE

## 2018-12-10 PROCEDURE — 36415 COLL VENOUS BLD VENIPUNCTURE: CPT

## 2018-12-10 PROCEDURE — 74177 CT ABD & PELVIS W/CONTRAST: CPT

## 2018-12-10 PROCEDURE — 87491 CHLMYD TRACH DNA AMP PROBE: CPT

## 2018-12-10 PROCEDURE — 81025 URINE PREGNANCY TEST: CPT

## 2018-12-10 PROCEDURE — 96372 THER/PROPH/DIAG INJ SC/IM: CPT

## 2018-12-10 PROCEDURE — 81001 URINALYSIS AUTO W/SCOPE: CPT

## 2018-12-10 RX ORDER — SODIUM CHLORIDE 0.9 % (FLUSH) 0.9 %
5-10 SYRINGE (ML) INJECTION AS NEEDED
Status: DISCONTINUED | OUTPATIENT
Start: 2018-12-10 | End: 2018-12-10 | Stop reason: HOSPADM

## 2018-12-10 RX ORDER — DOXYCYCLINE 100 MG/1
100 CAPSULE ORAL 2 TIMES DAILY
Qty: 28 CAP | Refills: 0 | Status: SHIPPED | OUTPATIENT
Start: 2018-12-10 | End: 2018-12-24

## 2018-12-10 RX ORDER — METRONIDAZOLE 500 MG/1
500 TABLET ORAL 2 TIMES DAILY
Qty: 14 TAB | Refills: 0 | Status: SHIPPED | OUTPATIENT
Start: 2018-12-10 | End: 2018-12-17

## 2018-12-10 RX ORDER — SODIUM CHLORIDE 0.9 % (FLUSH) 0.9 %
10 SYRINGE (ML) INJECTION
Status: COMPLETED | OUTPATIENT
Start: 2018-12-10 | End: 2018-12-10

## 2018-12-10 RX ORDER — KETOROLAC TROMETHAMINE 30 MG/ML
30 INJECTION, SOLUTION INTRAMUSCULAR; INTRAVENOUS
Status: COMPLETED | OUTPATIENT
Start: 2018-12-10 | End: 2018-12-10

## 2018-12-10 RX ORDER — SODIUM CHLORIDE 0.9 % (FLUSH) 0.9 %
5-10 SYRINGE (ML) INJECTION EVERY 8 HOURS
Status: DISCONTINUED | OUTPATIENT
Start: 2018-12-10 | End: 2018-12-10 | Stop reason: HOSPADM

## 2018-12-10 RX ADMIN — LIDOCAINE HYDROCHLORIDE 250 MG: 10 INJECTION, SOLUTION EPIDURAL; INFILTRATION; INTRACAUDAL; PERINEURAL at 16:26

## 2018-12-10 RX ADMIN — IOPAMIDOL 100 ML: 755 INJECTION, SOLUTION INTRAVENOUS at 17:13

## 2018-12-10 RX ADMIN — KETOROLAC TROMETHAMINE 30 MG: 30 INJECTION, SOLUTION INTRAMUSCULAR; INTRAVENOUS at 16:26

## 2018-12-10 RX ADMIN — Medication 10 ML: at 17:13

## 2018-12-10 NOTE — ED NOTES
Emergency Department Nursing Plan of Care The Nursing Plan of Care is developed from the Nursing assessment and Emergency Department Attending provider initial evaluation. The plan of care may be reviewed in the ED Provider note. The Plan of Care was developed with the following considerations:  
Patient / Family readiness to learn indicated by:verbalized understanding Persons(s) to be included in education: patient Barriers to Learning/Limitations:No 
 
Signed Jud Hickman RN   
12/10/2018   4:08 PM

## 2018-12-10 NOTE — DISCHARGE INSTRUCTIONS
Bacterial Vaginosis: Care Instructions  Your Care Instructions    Bacterial vaginosis is a type of vaginal infection. It is caused by excess growth of certain bacteria that are normally found in the vagina. Symptoms can include itching, swelling, pain when you urinate or have sex, and a gray or yellow discharge with a \"fishy\" odor. It is not considered an infection that is spread through sexual contact. Although symptoms can be annoying and uncomfortable, bacterial vaginosis does not usually cause other health problems. However, if you have it while you are pregnant, it can cause complications. While the infection may go away on its own, most doctors use antibiotics to treat it. You may have been prescribed pills or vaginal cream. With treatment, bacterial vaginosis usually clears up in 5 to 7 days. Follow-up care is a key part of your treatment and safety. Be sure to make and go to all appointments, and call your doctor if you are having problems. It's also a good idea to know your test results and keep a list of the medicines you take. How can you care for yourself at home? · Take your antibiotics as directed. Do not stop taking them just because you feel better. You need to take the full course of antibiotics. · Do not eat or drink anything that contains alcohol if you are taking metronidazole (Flagyl). · Keep using your medicine if you start your period. Use pads instead of tampons while using a vaginal cream or suppository. Tampons can absorb the medicine. · Wear loose cotton clothing. Do not wear nylon and other materials that hold body heat and moisture close to the skin. · Do not scratch. Relieve itching with a cold pack or a cool bath. · Do not wash your vaginal area more than once a day. Use plain water or a mild, unscented soap. Do not douche. When should you call for help?   Watch closely for changes in your health, and be sure to contact your doctor if:    · You have unexpected vaginal bleeding.     · You have a fever.     · You have new or increased pain in your vagina or pelvis.     · You are not getting better after 1 week.     · Your symptoms return after you finish the course of your medicine. Where can you learn more? Go to http://harjit-alfredo.info/. Pastor Carney in the search box to learn more about \"Bacterial Vaginosis: Care Instructions. \"  Current as of: May 15, 2018  Content Version: 11.8  © 5945-6984 [employer]. Care instructions adapted under license by [employer] (which disclaims liability or warranty for this information). If you have questions about a medical condition or this instruction, always ask your healthcare professional. [address] any warranty or liability for your use of this information. Pelvic Inflammatory Disease: Care Instructions  Your Care Instructions    Pelvic inflammatory disease, or PID, is an infection of a woman's fallopian tubes and other reproductive organs. PID is usually caused by a sexually transmitted infection (STI), such as gonorrhea or chlamydia. PID can cause scars in the fallopian tubes, making it hard for a woman to get pregnant. Having one STI increases your risk for other STIs, such as genital herpes, genital warts, syphilis, and HIV. It is important to take all the medicine that was prescribed. PID can cause serious health problems if you do not complete your treatment. Follow-up care is a key part of your treatment and safety. Be sure to make and go to all appointments, and call your doctor if you are having problems. It's also a good idea to know your test results and keep a list of the medicines you take. How can you care for yourself at home? · Take your antibiotics as directed. Do not stop taking them just because you feel better. You need to take the full course of antibiotics. · Rest until your fever and pain have improved.   · Take pain medicines exactly as directed. ? If the doctor gave you a prescription medicine for pain, take it as prescribed. ? If you are not taking a prescription pain medicine, ask your doctor if you can take an over-the-counter medicine. · Use a hot water bottle or a heating pad (set on low) on your belly for pain. · Do not douche. · Do not have sex or use tampons (you can use pads instead) until you have taken all the medicine, your pain is gone, and you feel completely well. · Talk to any sex partners you have had in the past 2 months. They need to be tested and may need to be treated for STIs. To prevent STIs  · Use latex condoms every time you have sex. Use them from the beginning to the end of sexual contact. · Talk to your partner before you have sex. Find out if he or she has or is at risk for any sexually transmitted infection (STI). Keep in mind that a person may be able to spread an STI even if he or she does not have symptoms. · Do not have sex with anyone who has symptoms of an STI, such as sores on the genitals or mouth. · Having one sex partner (who does not have STIs and does not have sex with anyone else) is a good way to avoid STIs. When should you call for help? Call your doctor now or seek immediate medical care if:    · You have a new or higher fever.     · You have unusual vaginal bleeding.     · You have new or worse belly or pelvic pain.     · You have vaginal discharge that has increased in amount or smells bad.    Watch closely for changes in your health, and be sure to contact your doctor if:    · You do not get better as expected. Where can you learn more? Go to http://harjit-alfredo.info/. Enter N294 in the search box to learn more about \"Pelvic Inflammatory Disease: Care Instructions. \"  Current as of: May 15, 2018  Content Version: 11.8  © 4702-8636 [employer], [employer].  Care instructions adapted under license by [employer] (which disclaims liability or warranty for this information). If you have questions about a medical condition or this instruction, always ask your healthcare professional. [place] [address] any warranty or liability for your use of this information.

## 2018-12-10 NOTE — ED PROVIDER NOTES
EMERGENCY DEPARTMENT HISTORY AND PHYSICAL EXAM 
 
 
Date: 12/10/2018 Patient Name: Sharron Lynn History of Presenting Illness Chief Complaint Patient presents with  Pelvic Pain History Provided By: Patient HPI: Sharron Lynn, 39 y.o. female with PMHx significant for PID, recurrent BV, hypothyroidism, HTN, anxiety, DJD, ADD, presents ambulatory to the ED with cc of acute moderate aching constant bilateral pelvic pain radiating to back w/ vaginal discharge x 1 week. Pt endorses hx fo similar pain w/ PID diagnosis in 8/2018 at [employer] (STI negative; BV+). Pt endorses starting her Doxycycline but was not able to finish Rx d/t having it thrown out at work on accident. Increased pain w/ BM. Denies fever, chills, n/v, cp, diarrhea, constipation, dysuria, urinary frequency, hematuria, urinary urgency, new partner/STI concern. No modifying factors/ medications. There are no other complaints, changes, or physical findings at this time. PCP: Yin Archer PA-C Current Facility-Administered Medications Medication Dose Route Frequency Provider Last Rate Last Dose  sodium chloride (NS) flush 5-10 mL  5-10 mL IntraVENous Q8H Elizabeth Prieto PA-C      
 sodium chloride (NS) flush 5-10 mL  5-10 mL IntraVENous PRN Elizabeth Prieto PA-C Current Outpatient Medications Medication Sig Dispense Refill  metroNIDAZOLE (FLAGYL) 500 mg tablet Take 1 Tab by mouth two (2) times a day for 7 days. 14 Tab 0  
 doxycycline (MONODOX) 100 mg capsule Take 1 Cap by mouth two (2) times a day for 14 days. 28 Cap 0  
 atropine-PHENobarbital-scopolamine-hyoscyamine (DONNATAL) 16.2-0.1037 -0.0194 mg per tablet Take 1 Tab by mouth every six (6) hours as needed for Pain. 20 Tab 0  
 famotidine (PEPCID) 20 mg tablet Take 1 Tab by mouth two (2) times a day. 20 Tab 0  
 levothyroxine (SYNTHROID) 175 mcg tablet Take 1 Tab by mouth Daily (before breakfast). 90 Tab 1 Past History Past Medical History: 
Past Medical History:  
Diagnosis Date  ADD (attention deficit disorder)  Anxiety  Carpal tunnel syndrome   
 left  DJD (degenerative joint disease)  Endocrine disease   
 hypothyroidism  
 H. PYLORI INFECTION   
 treated with triple therapy in Fall 2008  Hypertension  Sleep apnea  Unspecified hypothyroidism Past Surgical History: 
Past Surgical History:  
Procedure Laterality Date  EXCIS BARTHOLIN GLAND/CYST    
 HX TUBAL LIGATION Family History: 
Family History Problem Relation Age of Onset  Hypertension Mother  Hypertension Father  Stroke Father   
     age 79  Thyroid Disease Sister   
     hypothyroidism [de-identified] Arthritis-rheumatoid Sister Social History: 
Social History Tobacco Use  Smoking status: Never Smoker  Smokeless tobacco: Never Used Substance Use Topics  Alcohol use: No  
 Drug use: No  
 
 
Allergies: Allergies Allergen Reactions  Darvocet A500 [Propoxyphene N-Acetaminophen] Hives Review of Systems Review of Systems Constitutional: Negative. Negative for activity change, appetite change, chills and fever. HENT: Negative. Negative for congestion, ear pain, postnasal drip and sore throat. Eyes: Negative. Negative for pain and visual disturbance. Respiratory: Negative. Negative for cough and shortness of breath. Cardiovascular: Negative. Negative for chest pain. Gastrointestinal: Negative for abdominal pain, anal bleeding, diarrhea, nausea, rectal pain and vomiting. Genitourinary: Positive for pelvic pain and vaginal discharge. Negative for difficulty urinating, dysuria, flank pain, frequency, menstrual problem, urgency, vaginal bleeding and vaginal pain. Musculoskeletal: Negative. Negative for joint swelling. Skin: Negative. Negative for rash. Neurological: Negative. Negative for dizziness, light-headedness and headaches. Psychiatric/Behavioral: Negative. Physical Exam  
Physical Exam  
Constitutional: She is oriented to person, place, and time. She appears well-developed and well-nourished. No distress. Obese  female in NAD. HENT:  
Head: Normocephalic and atraumatic. Right Ear: External ear normal.  
Left Ear: External ear normal.  
Eyes: Conjunctivae and EOM are normal. Pupils are equal, round, and reactive to light. Neck: Normal range of motion. Cardiovascular: Normal rate, regular rhythm, normal heart sounds and intact distal pulses. Pulmonary/Chest: Effort normal and breath sounds normal.  
Abdominal: Soft. Bowel sounds are normal. She exhibits no distension. There is no tenderness. There is no rebound. Genitourinary: Uterus normal. Pelvic exam was performed with patient supine. There is no rash, tenderness, lesion or injury on the right labia. There is no rash, tenderness, lesion or injury on the left labia. Cervix exhibits motion tenderness. Cervix exhibits no discharge and no friability. Right adnexum displays no mass, no tenderness and no fullness. Left adnexum displays no mass, no tenderness and no fullness. There is tenderness in the vagina. No erythema or bleeding in the vagina. No foreign body in the vagina. No signs of injury around the vagina. Vaginal discharge (scant white) found. Musculoskeletal: Normal range of motion. Neurological: She is alert and oriented to person, place, and time. Skin: Skin is warm and dry. She is not diaphoretic. Psychiatric: She has a normal mood and affect. Her behavior is normal. Judgment and thought content normal.  
Nursing note and vitals reviewed. Diagnostic Study Results Labs - Recent Results (from the past 12 hour(s)) URINALYSIS W/ REFLEX CULTURE Collection Time: 12/10/18  4:16 PM  
Result Value Ref Range Color YELLOW/STRAW Appearance CLEAR CLEAR  Specific gravity >1.030 (H) 1.003 - 1.030  
 pH (UA) 6.0 5.0 - 8.0 Protein NEGATIVE  NEG mg/dL Glucose NEGATIVE  NEG mg/dL Ketone NEGATIVE  NEG mg/dL Bilirubin NEGATIVE  NEG Blood NEGATIVE  NEG Urobilinogen 0.2 0.2 - 1.0 EU/dL Nitrites NEGATIVE  NEG Leukocyte Esterase NEGATIVE  NEG    
 WBC 0-4 0 - 4 /hpf  
 RBC 0-5 0 - 5 /hpf Epithelial cells MANY (A) FEW /lpf Bacteria NEGATIVE  NEG /hpf  
 UA:UC IF INDICATED CULTURE NOT INDICATED BY UA RESULT CNI    
CBC WITH AUTOMATED DIFF Collection Time: 12/10/18  4:16 PM  
Result Value Ref Range WBC 8.0 3.6 - 11.0 K/uL  
 RBC 4.70 3.80 - 5.20 M/uL  
 HGB 13.8 11.5 - 16.0 g/dL HCT 42.5 35.0 - 47.0 % MCV 90.4 80.0 - 99.0 FL  
 MCH 29.4 26.0 - 34.0 PG  
 MCHC 32.5 30.0 - 36.5 g/dL  
 RDW 12.5 11.5 - 14.5 % PLATELET 967 473 - 176 K/uL MPV 11.6 8.9 - 12.9 FL  
 NRBC 0.0 0  WBC ABSOLUTE NRBC 0.00 0.00 - 0.01 K/uL NEUTROPHILS 60 32 - 75 % LYMPHOCYTES 26 12 - 49 % MONOCYTES 13 5 - 13 % EOSINOPHILS 1 0 - 7 % BASOPHILS 1 0 - 1 % IMMATURE GRANULOCYTES 0 0.0 - 0.5 % ABS. NEUTROPHILS 4.8 1.8 - 8.0 K/UL  
 ABS. LYMPHOCYTES 2.1 0.8 - 3.5 K/UL  
 ABS. MONOCYTES 1.1 (H) 0.0 - 1.0 K/UL  
 ABS. EOSINOPHILS 0.1 0.0 - 0.4 K/UL  
 ABS. BASOPHILS 0.0 0.0 - 0.1 K/UL  
 ABS. IMM. GRANS. 0.0 0.00 - 0.04 K/UL  
 DF AUTOMATED METABOLIC PANEL, COMPREHENSIVE Collection Time: 12/10/18  4:16 PM  
Result Value Ref Range Sodium 142 136 - 145 mmol/L Potassium 3.6 3.5 - 5.1 mmol/L Chloride 105 97 - 108 mmol/L  
 CO2 27 21 - 32 mmol/L Anion gap 10 5 - 15 mmol/L Glucose 90 65 - 100 mg/dL BUN 11 6 - 20 MG/DL Creatinine 0.92 0.55 - 1.02 MG/DL  
 BUN/Creatinine ratio 12 12 - 20 GFR est AA >60 >60 ml/min/1.73m2 GFR est non-AA >60 >60 ml/min/1.73m2 Calcium 8.5 8.5 - 10.1 MG/DL Bilirubin, total 0.3 0.2 - 1.0 MG/DL  
 ALT (SGPT) 20 12 - 78 U/L  
 AST (SGOT) 22 15 - 37 U/L Alk.  phosphatase 59 45 - 117 U/L  
 Protein, total 8.0 6.4 - 8.2 g/dL Albumin 3.7 3.5 - 5.0 g/dL Globulin 4.3 (H) 2.0 - 4.0 g/dL A-G Ratio 0.9 (L) 1.1 - 2.2 KOH, OTHER SOURCES Collection Time: 12/10/18  4:16 PM  
Result Value Ref Range Special Requests: NO SPECIAL REQUESTS    
 KOH NO YEAST SEEN    
WET PREP Collection Time: 12/10/18  4:16 PM  
Result Value Ref Range Clue cells CLUE CELLS PRESENT Wet prep NO TRICHOMONAS SEEN    
HCG URINE, QL. - POC Collection Time: 12/10/18  4:31 PM  
Result Value Ref Range Pregnancy test,urine (POC) NEGATIVE  NEG Radiologic Studies -  
CT ABD PELV W CONT Final Result IMPRESSION:  
  
1. Fatty infiltration of the liver otherwise no acute abnormality CT Results  (Last 48 hours) 12/10/18 1741  CT ABD PELV W CONT Final result Impression:  IMPRESSION:  
   
1. Fatty infiltration of the liver otherwise no acute abnormality Narrative:  EXAM: CT ABD PELV W CONT INDICATION: Pain, pelvis COMPARISON: Ultrasound same day and 2/24/2014 CONTRAST: 100 mL of Isovue-370. TECHNIQUE:   
Following the uneventful intravenous administration of contrast, thin axial  
images were obtained through the abdomen and pelvis. Coronal and sagittal  
reconstructions were generated. Oral contrast was not administered. CT dose  
reduction was achieved through use of a standardized protocol tailored for this  
examination and automatic exposure control for dose modulation. FINDINGS:   
LUNG BASES: Clear. INCIDENTALLY IMAGED HEART AND MEDIASTINUM: Unremarkable. LIVER: Diffuse fatty infiltration with focal sparing in the left hepatic lobe GALLBLADDER: Unremarkable. SPLEEN: No mass. PANCREAS: No mass or ductal dilatation. ADRENALS: Unremarkable. KIDNEYS: No mass, calculus, or hydronephrosis. STOMACH: Unremarkable. SMALL BOWEL: No dilatation or wall thickening. COLON: No dilatation or wall thickening. APPENDIX: Unremarkable. PERITONEUM: No ascites or pneumoperitoneum. RETROPERITONEUM: No lymphadenopathy or aortic aneurysm. REPRODUCTIVE ORGANS: Within normal limits URINARY BLADDER: Decompressed BONES: No destructive bone lesion. ADDITIONAL COMMENTS: N/A  
   
  
  
 
CXR Results  (Last 48 hours) None Medical Decision Making I am the first provider for this patient. I reviewed the vital signs, available nursing notes, past medical history, past surgical history, family history and social history. Vital Signs-Reviewed the patient's vital signs. Patient Vitals for the past 12 hrs: 
 Temp Pulse Resp BP SpO2  
12/10/18 1647    126/62   
12/10/18 1645    126/62   
12/10/18 1546 98.8 °F (37.1 °C) 80 18 (!) 149/104 99 % Pulse Oximetry Analysis - 99% on RA Records Reviewed: Nursing Notes, Old Medical Records, Previous Radiology Studies and Previous Laboratory Studies Provider Notes (Medical Decision Making): DDx: bv, vaginal candidiasis, sti, pid, abscess, uti, pregnancy ED Course:  
Initial assessment performed. The patients presenting problems have been discussed, and they are in agreement with the care plan formulated and outlined with them. I have encouraged them to ask questions as they arise throughout their visit. 
 
 5:51 PM 
Pt resting comfortably in room in NAD. No new symptoms or complaints at this time. Endorses sxs (abd pain) improvement with medications. Available labs/ results reviewed with pt. CT results pending. Critical Care Time:  
0 Disposition: 
6:42 PM 
I have discussed with patient their diagnosis, treatment, and follow up plan. The patient agrees to follow up as outlined in discharge paperwork and also to return to the ED with any worsening. Abran Goel PA-C 
 
 
 
 
PLAN: 
1. Current Discharge Medication List  
  
START taking these medications Details metroNIDAZOLE (FLAGYL) 500 mg tablet Take 1 Tab by mouth two (2) times a day for 7 days. Qty: 14 Tab, Refills: 0  
  
doxycycline (MONODOX) 100 mg capsule Take 1 Cap by mouth two (2) times a day for 14 days. Qty: 28 Cap, Refills: 0 CONTINUE these medications which have NOT CHANGED Details  
atropine-PHENobarbital-scopolamine-hyoscyamine (DONNATAL) 16.2-0.1037 -0.0194 mg per tablet Take 1 Tab by mouth every six (6) hours as needed for Pain. Qty: 20 Tab, Refills: 0 Associated Diagnoses: Abdominal pain, generalized  
  
famotidine (PEPCID) 20 mg tablet Take 1 Tab by mouth two (2) times a day. Qty: 20 Tab, Refills: 0  
  
levothyroxine (SYNTHROID) 175 mcg tablet Take 1 Tab by mouth Daily (before breakfast). Qty: 90 Tab, Refills: 1 Associated Diagnoses: Hypothyroidism, unspecified type 2. Follow-up Information Follow up With Specialties Details Why Contact Info Jewel Goltz, PA-C Physician Assistant Schedule an appointment as soon as possible for a visit in 1 week As needed, If symptoms worsen [address] [id number] [phone] Return to ED if worse Diagnosis Clinical Impression: 1. BV (bacterial vaginosis) 2. PID (acute pelvic inflammatory disease) Attestations:

## 2018-12-11 NOTE — ED NOTES
Patient (s)  given copy of dc instructions and 2 script(s). Patient (s)  verbalized understanding of instructions and script (s). Patient given a current medication reconciliation form and verbalized understanding of their medications. Patient (s) verbalized understanding of the importance of discussing medications with  his or her physician or clinic they will be following up with. Patient alert and oriented and in no acute distress. Patient discharged home ambulatory with self and declined a wheelchair.

## 2019-11-12 ENCOUNTER — HOSPITAL ENCOUNTER (OUTPATIENT)
Dept: LAB | Age: 44
Discharge: HOME | End: 2019-11-12

## 2019-11-12 LAB
CHOLEST SERPL-MCNC: 149 MG/DL
HDLC SERPL-MCNC: 40 MG/DL
HDLC SERPL: 3.7 {RATIO} (ref 0–5)
LDLC SERPL CALC-MCNC: 88.8 MG/DL (ref 0–100)
LIPID PROFILE,FLP: NORMAL
TRIGL SERPL-MCNC: 101 MG/DL (ref ?–150)
TSH SERPL DL<=0.05 MIU/L-ACNC: 1.86 UIU/ML (ref 0.36–3.74)
VLDLC SERPL CALC-MCNC: 20.2 MG/DL

## 2019-11-12 PROCEDURE — 80061 LIPID PANEL: CPT

## 2019-11-12 PROCEDURE — 84443 ASSAY THYROID STIM HORMONE: CPT

## 2020-11-05 ENCOUNTER — OFFICE VISIT (OUTPATIENT)
Dept: SURGERY | Age: 45
End: 2020-11-05
Payer: MEDICAID

## 2020-11-05 VITALS
BODY MASS INDEX: 48.82 KG/M2 | OXYGEN SATURATION: 96 % | RESPIRATION RATE: 20 BRPM | WEIGHT: 293 LBS | TEMPERATURE: 98.3 F | HEART RATE: 76 BPM | DIASTOLIC BLOOD PRESSURE: 94 MMHG | SYSTOLIC BLOOD PRESSURE: 145 MMHG | HEIGHT: 65 IN

## 2020-11-05 DIAGNOSIS — R06.83 SNORING: ICD-10-CM

## 2020-11-05 DIAGNOSIS — K21.9 GASTROESOPHAGEAL REFLUX DISEASE WITHOUT ESOPHAGITIS: ICD-10-CM

## 2020-11-05 DIAGNOSIS — I10 ESSENTIAL HYPERTENSION: ICD-10-CM

## 2020-11-05 DIAGNOSIS — M25.551 HIP PAIN, RIGHT: ICD-10-CM

## 2020-11-05 DIAGNOSIS — E66.01 MORBID OBESITY (HCC): Primary | ICD-10-CM

## 2020-11-05 PROCEDURE — 99204 OFFICE O/P NEW MOD 45 MIN: CPT | Performed by: SURGERY

## 2020-11-05 RX ORDER — HYDROCHLOROTHIAZIDE 25 MG/1
25 TABLET ORAL DAILY
COMMUNITY
End: 2020-11-10 | Stop reason: CLARIF

## 2020-11-05 NOTE — PROGRESS NOTES
1. Have you been to the ER, urgent care clinic since your last visit? Hospitalized since your last visit? new patient    2. Have you seen or consulted any other health care providers outside of the [address] [place] [address] since your last visit? Include any pap smears or colon screening. New patient  Sabrina Rinaldi  Body composition    female  54 y.o. Vitals:    11/05/20 1355   Height: 5' 5\" (1.651 m)     Body mass index is 44.35 kg/m². [place] Neck- 18.5 inches  Waist-55.5 inches  Hips-54.5 inches

## 2020-11-06 NOTE — PATIENT INSTRUCTIONS
Learning About Weight-Loss (Bariatric) Surgery What is weight-loss surgery? Bariatric surgery is surgery to help you lose weight. This type of surgery is only used for people who are very overweight and have not been able to lose weight with diet and exercise. This surgery makes the stomach smaller. Some types of surgery also change the connection between your stomach and intestines. Having weight-loss surgery is a big step. After surgery, you'll need to make new, lifelong changes in how you eat and drink. How is weight-loss surgery done? Bariatric surgery may be either \"open\" or \"laparoscopic. \" Open surgery is done through a large cut (incision) in the belly. Laparoscopic surgery is done through several small cuts. The doctor puts a lighted tube, or scope, and other surgical tools through small cuts in your belly. The doctor is able to see your organs with the scope. There are different types of bariatric surgery. Gastric sleeve surgery The surgery is usually done through several small incisions in the belly. The doctor removes more than half of your stomach. This leaves a thin sleeve, or tube, that is about the size of a banana. Because part of your stomach has been removed, this can't be reversed. Jimena-en-Y gastric bypass surgery Jimena-en-Y (say \"mihir-en-why\") surgery changes the connection between the stomach and the intestines. The doctor separates a section of your stomach from the rest of your stomach. This makes a small pouch. The new pouch will hold the food you eat. The doctor connects the stomach pouch to the middle part of the small intestine. Gastric banding surgery The surgery is usually done through several small incisions in the belly. The doctor wraps a band around the upper part of the stomach. This creates a small pouch. The small size of the pouch means that you will get full after you eat just a small amount of food.  The doctor can inflate or deflate the band to adjust the size. This lets the doctor adjust how quickly food passes from the new pouch into the stomach. It does not change the connection between the stomach and the intestines. What can you expect after the surgery? You may stay in the hospital for one or more days after the surgery. How long you stay depends on the type of surgery you had. Most people need 2 to 4 weeks before they are ready to get back to their usual routine. For the first 2 to 6 weeks after surgery, you probably will need to follow a liquid or soft diet. Bit by bit, you will be able to eat more solid foods. Your doctor may advise you to work with a dietitian. This way you'll be sure to get enough protein, vitamins, and minerals while you are losing weight. Even with a healthy diet, you may need to take vitamin and mineral supplements. After surgery, you will not be able to eat very much at one time. You will get full quickly. Try not to eat too much at one time or eat foods that are high in fat or sugar. If you do, you may vomit, get stomach pain, or have diarrhea. You probably will lose weight very quickly in the first few months after surgery. As time goes on, your weight loss will slow down. You will have regular doctor visits to check how you are doing. Think of bariatric surgery as a tool to help you lose weight. It isn't an instant fix. You will still need to eat a healthy diet and get regular exercise. This will help you reach your weight goal and avoid regaining the weight you lose. Follow-up care is a key part of your treatment and safety. Be sure to make and go to all appointments, and call your doctor if you are having problems. It's also a good idea to know your test results and keep a list of the medicines you take. Where can you learn more? Go to http://www.gray.com/ Enter G469 in the search box to learn more about \"Learning About Weight-Loss (Bariatric) Surgery. \" 
 Current as of: December 11, 2019               Content Version: 12.6 © 1685-5153 [employer]. Care instructions adapted under license by [employer] (which disclaims liability or warranty for this information). If you have questions about a medical condition or this instruction, always ask your healthcare professional. [address] any warranty or liability for your use of this information.

## 2020-11-08 NOTE — PROGRESS NOTES
Bariatric Surgery Consultation    Subjective: The patient is a 54 y.o. obese  female with a Body mass index is 48.76 kg/m². .  The patient  her heaviest weight for the past 2 years; she has been overweight since young adulthood; she has been considering surgery since 2019; she desires surgery at this time because medical efforts at weight loss of been unsuccessful. Sandee Farah has tried multiple diets in her lifetime most recently tried unsupervised diets. Bariatric comorbidities present are   Patient Active Problem List   Diagnosis Code    Hypothyroidism E03.9    Fillmore hump E65    HTN (hypertension) I10    Hip pain, right M25.551    Morbid obesity (HCC) E66.01    OCD (obsessive compulsive disorder) F42.9    Anxiety F41.9    ADD (attention deficit disorder) F98.8    CRP elevated R79.82    Snoring R06.83    Gastroesophageal reflux disease without esophagitis K21.9     The patient desires laparoscopic gastric bypass surgery for surgical weight loss. The patients goal weight is 160 lbs. The highest acceptable weight is 200 lbs. These goals are consistent with expected outcomes of their desired operation. her Medical goals are hypertension resolution; her qualty of life goals are decreased joint pain.     Patient Active Problem List    Diagnosis Date Noted    Snoring 11/05/2020    Gastroesophageal reflux disease without esophagitis 11/05/2020    CRP elevated 03/28/2017    Morbid obesity ([address] 06/03/2016    OCD (obsessive compulsive disorder) 06/03/2016    Anxiety 06/03/2016    ADD (attention deficit disorder) 06/03/2016    HTN (hypertension) 05/28/2014    Hip pain, right 05/28/2014    Fillmore hump 06/22/2010    Hypothyroidism       Past Surgical History:   Procedure Laterality Date    EXCIS BARTHOLIN GLAND/CYST      HX TUBAL LIGATION        Social History     Tobacco Use    Smoking status: Never Smoker    Smokeless tobacco: Never Used   Substance Use Topics    Alcohol use: No      Family History   Problem Relation Age of Onset    Hypertension Mother     Hypertension Father     Stroke Father         age 79    Thyroid Disease Sister         hypothyroidism   Idalia Drake Arthritis-rheumatoid Sister       Prior to Admission medications    Medication Sig Start Date End Date Taking? Authorizing Provider   hydroCHLOROthiazide (HYDRODIURIL) 25 mg tablet Take 25 mg by mouth daily. Yes Provider, Historical   levothyroxine (SYNTHROID) 175 mcg tablet Take 1 Tab by mouth Daily (before breakfast). 2/20/18  Yes Keegan Jane PA-C   atropine-PHENobarbital-scopolamine-hyoscyamine [facility]) 16.2-0.1037 -0.0194 mg per tablet Take 1 Tab by mouth every six (6) hours as needed for Pain. 8/19/18   Saray Brennan MD   famotidine (PEPCID) 20 mg tablet Take 1 Tab by mouth two (2) times a day.  8/19/18   Saray Brennan MD     Allergies   Allergen Reactions    Darvocet A500 [Propoxyphene N-Acetaminophen] Hives         Review of Systems:    Constitutional: positive for weight gain  Eyes: negative  Ears, nose, mouth, throat, and face: positive for snoring  Respiratory: positive for dyspnea on exertion, negative for asthma or wheezing  Cardiovascular: negative for chest pressure/discomfort, palpitations  Gastrointestinal: positive for dyspepsia and reflux symptoms, negative for nausea, vomiting and abdominal pain  Genitourinary:negative  Integument/breast: negative  Hematologic/lymphatic: negative  Musculoskeletal:positive for arthralgias and back pain  Neurological: negative for paresthesia    Objective:     Visit Vitals  BP (!) 145/94   Pulse 76   Temp 98.3 °F (36.8 °C)   Resp 20   Ht 5' 5\" (1.651 m)   Wt 132.9 kg (293 lb)   SpO2 96%   BMI 48.76 kg/m²       Physical Exam:  GENERAL: alert, cooperative, no distress, appears stated age, morbidly obese, EYE: negative, LYMPHATIC: Cervical, supraclavicular nodes normal. THROAT & NECK: normal, LUNG: clear to auscultation bilaterally, HEART: regular rate and rhythm, S1, S2 normal, no murmur. ABDOMEN: soft, non-tender. Bowel sounds normal. No masses,  no organomegaly, no hernia. EXTREMITIES:  extremities normal, atraumatic, no cyanosis or edema, SKIN: non-tender buffalo hump., NEUROLOGIC: negative. Assessment:     Morbid obesity with comorbidities. No success with medical management. Plan:     laparoscopic gastric bypass surgery    This is a 54 y.o. female with a BMI of Body mass index is 48.76 kg/m². and the weight-related comorbidities listed above. Molly Ashraf meets the NIH criteria for bariatric surgery based upon the BMI of Body mass index is 48.76 kg/m². and multiple weight-related co-morbidities. Molly Ashraf has elected laparoscopic janice-en-Y gastric bypass as her intervention of choice for treatment of morbid obesity through surgical means secondary to its long term history of success. In the office today, following Shauna's history and physical examination, a 30 minute discussion regarding the anatomic alterations for the laparoscopic janice-en-Y gastric bypass was undertaken. The dietary expectations and the patient and physician dependent factors for success were thoroughly discussed, to include the need for interval follow-up and long-term dietary changes associated with success. The possible complications of the janice-en-Y gastric bypass  were also discussed, to include VTE, staple line leak, bleeding, stricture, open procedure, ulcer, dysphagia, poor weight loss/weight regain, poor comorbidity resolution, infection, internal hernia and pouch dilation. Specific weight related outcomes for success were also discussed with an emphasis on careful and close follow-up with the first year. The patient expressed an understanding of the above factors, and her questions were answered in their entirety.     In addition, the patient attended a 1.5 hour power point seminar regarding obesity, surgical weight loss including, adjustable gastric band, gastric bypass, and sleeve gastrectomy. This discussion contrasted the different surgical techniques, mechanisms of actions and expected outcomes, and surgical and medical risks associated with each procedure. During this seminar, there was a long question and answer session where each questions was answered until there were no additional questions. Today, the patient had all of her questions answered and desires to proceed with pre-qualification for bariatric surgery initially choosing janice-en-Y gastric bypass as her surgical option. She will schedule psychology evaluation and initiate 6-month medical weight loss program.  We will order upper gastrointestinal series given reflux symptoms. We will order dietitian evaluation, and sleep medicine evaluation given significant snoring and daytime tiredness.     Signed By: Alycia Palmer MD     November 8, 2020

## 2020-11-10 ENCOUNTER — OFFICE VISIT (OUTPATIENT)
Dept: INTERNAL MEDICINE CLINIC | Age: 45
End: 2020-11-10
Payer: MEDICAID

## 2020-11-10 ENCOUNTER — CLINICAL SUPPORT (OUTPATIENT)
Dept: SURGERY | Age: 45
End: 2020-11-10

## 2020-11-10 VITALS
OXYGEN SATURATION: 98 % | TEMPERATURE: 97.8 F | RESPIRATION RATE: 14 BRPM | HEART RATE: 79 BPM | DIASTOLIC BLOOD PRESSURE: 85 MMHG | WEIGHT: 290.7 LBS | SYSTOLIC BLOOD PRESSURE: 123 MMHG | HEIGHT: 65 IN | BODY MASS INDEX: 48.43 KG/M2

## 2020-11-10 DIAGNOSIS — E66.01 MORBID OBESITY (HCC): Primary | ICD-10-CM

## 2020-11-10 DIAGNOSIS — Z00.00 PHYSICAL EXAM: Primary | ICD-10-CM

## 2020-11-10 DIAGNOSIS — I10 ESSENTIAL HYPERTENSION: ICD-10-CM

## 2020-11-10 DIAGNOSIS — G47.33 OBSTRUCTIVE SLEEP APNEA: ICD-10-CM

## 2020-11-10 DIAGNOSIS — F32.A ANXIETY AND DEPRESSION: ICD-10-CM

## 2020-11-10 DIAGNOSIS — K76.0 STEATOSIS OF LIVER: ICD-10-CM

## 2020-11-10 DIAGNOSIS — E03.9 ACQUIRED HYPOTHYROIDISM: ICD-10-CM

## 2020-11-10 DIAGNOSIS — E78.5 DYSLIPIDEMIA: ICD-10-CM

## 2020-11-10 DIAGNOSIS — F41.9 ANXIETY AND DEPRESSION: ICD-10-CM

## 2020-11-10 DIAGNOSIS — E66.01 MORBID OBESITY (HCC): ICD-10-CM

## 2020-11-10 PROCEDURE — 36415 COLL VENOUS BLD VENIPUNCTURE: CPT | Performed by: INTERNAL MEDICINE

## 2020-11-10 PROCEDURE — 99215 OFFICE O/P EST HI 40 MIN: CPT | Performed by: INTERNAL MEDICINE

## 2020-11-10 RX ORDER — BUPROPION HYDROCHLORIDE 150 MG/1
150 TABLET ORAL
Qty: 30 TAB | Refills: 11 | Status: SHIPPED | OUTPATIENT
Start: 2020-11-10 | End: 2020-11-24 | Stop reason: SINTOL

## 2020-11-10 RX ORDER — SPIRONOLACTONE AND HYDROCHLOROTHIAZIDE 25; 25 MG/1; MG/1
1 TABLET ORAL DAILY
Qty: 30 TAB | Refills: 11 | Status: SHIPPED | OUTPATIENT
Start: 2020-11-10 | End: 2021-07-16

## 2020-11-10 NOTE — PROGRESS NOTES
1. Have you been to the ER, urgent care clinic since your last visit? Hospitalized since your last visit? No    2. Have you seen or consulted any other health care providers outside of the [address] [place] [address] since your last visit? Include any pap smears or colon screening. No     Pt states her arthritis is acting up, needs to switch anxiety medicine and needs sleep medicine. Needs thyroid levels checked, also has residual effects from having Covid in July.

## 2020-11-10 NOTE — PROGRESS NOTES
[address] [place] [address] and Primary Care  [place] [address]  [address] [place] [address]  Phone:  [phone]  Fax: [phone]       Chief Complaint   Patient presents with    Physical   .      SUBJECTIVE:    Jeyson Magallanes is a 54 y.o. female Comes in as a new patient. She has a history of hypertension for at least five to six years. She has an allergy to ACE inhibitors, which has led to a cough in the past.      She also has a history of hypothyroidism. This was an acquired defect and she remains on a thyroid supplement. She is morbidly obese and is actively seeking bariatric intervention. As a direct result of this, she has a history of obstructive sleep apnea, as well as steatosis. She had a COVID infection about three months ago and has been left with anosmia, anxiety, and palpitations. Finally, she has a history of depression and anxiety and has been intolerant of Sertraline, which caused her to get angry. She wishes to have a replacement. Current Outpatient Medications   Medication Sig Dispense Refill    spironolactone-hydrochlorothiazide (ALDACTAZIDE) 25-25 mg per tablet Take 1 Tab by mouth daily. 30 Tab 11    buPROPion XL (WELLBUTRIN XL) 150 mg tablet Take 1 Tab by mouth every morning. 30 Tab 11    levothyroxine (SYNTHROID) 175 mcg tablet Take 1 Tab by mouth Daily (before breakfast). 90 Tab 1    atropine-PHENobarbital-scopolamine-hyoscyamine (DONNATAL) 16.2-0.1037 -0.0194 mg per tablet Take 1 Tab by mouth every six (6) hours as needed for Pain. 20 Tab 0    famotidine (PEPCID) 20 mg tablet Take 1 Tab by mouth two (2) times a day. 20 Tab 0     Past Medical History:   Diagnosis Date    ADD (attention deficit disorder)     Anxiety     Carpal tunnel syndrome     left    DJD (degenerative joint disease)     Endocrine disease     hypothyroidism    H.  PYLORI INFECTION     treated with triple therapy in Fall 2008    Hypertension     Sleep apnea     Unspecified hypothyroidism      Past Surgical History:   Procedure Laterality Date    EXCIS BARTHOLIN GLAND/CYST      HX GYN      Bartholin cyst removal    HX TUBAL LIGATION       Allergies   Allergen Reactions    Darvocet A500 [Propoxyphene N-Acetaminophen] Hives    Sertraline Other (comments)     Intolerance         REVIEW OF SYSTEMS:  General: negative for - chills or fever  ENT: negative for - headaches, nasal congestion or tinnitus  Respiratory: negative for - cough, hemoptysis, shortness of breath or wheezing  Cardiovascular : negative for - chest pain, edema, palpitations or shortness of breath  Gastrointestinal: negative for - abdominal pain, blood in stools, heartburn or nausea/vomiting  Genito-Urinary: no dysuria, trouble voiding, or hematuria  Musculoskeletal: negative for - gait disturbance, joint pain, joint stiffness or joint swelling  Neurological: no TIA or stroke symptoms  Hematologic: no bruises, no bleeding, no swollen glands  Integument: no lumps, mole changes, nail changes or rash  Endocrine: no malaise/lethargy or unexpected weight changes      Social History     Socioeconomic History    Marital status: SINGLE     Spouse name: Not on file    Number of children: 5    Years of education: Not on file    Highest education level: Not on file   Occupational History    Occupation: [employer] -    Tobacco Use    Smoking status: Never Smoker    Smokeless tobacco: Never Used   Substance and Sexual Activity    Alcohol use: No    Drug use: No    Sexual activity: Never   Other Topics Concern    Exercise Yes     Comment: Walking 4 miles/day to/from work. Social History Narrative    Living with 23 yo daughter and 2 grandchildren in [place]. No pets in the house. 21, 25, 15, 5 yo children - 2 youngest children live in [facility] with pt's mother.       Family History   Problem Relation Age of Onset    Hypertension Mother     Hypertension Father    [place] Exon Stroke Father         age 79    Thyroid Disease Sister         hypothyroidism    Arthritis-rheumatoid Sister        OBJECTIVE:    Visit Vitals  /85   Pulse 79   Temp 97.8 °F (36.6 °C) (Oral)   Resp 14   Ht 5' 5\" (1.651 m)   Wt 290 lb 11.2 oz (131.9 kg)   SpO2 98%   BMI 48.38 kg/m²     CONSTITUTIONAL: well , well nourished, appears age appropriate  EYES: perrla, eom intact  ENMT:moist mucous membranes, pharynx clear  NECK: supple. Thyroid normal  RESPIRATORY: Chest: clear to ascultation and percussion   CARDIOVASCULAR: Heart: regular rate and rhythm  GASTROINTESTINAL: Abdomen: soft, bowel sounds active  HEMATOLOGIC: no pathological lymph nodes palpated  MUSCULOSKELETAL: Extremities: no edema, pulse 1+   INTEGUMENT: No unusual rashes or suspicious skin lesions noted. Nails appear normal.  NEUROLOGIC: non-focal exam   MENTAL STATUS: alert and oriented, appropriate affect      ASSESSMENT:  1. Physical exam    2. Anxiety and depression    3. Morbid obesity ([address]    4. Obstructive sleep apnea    5. Acquired hypothyroidism    6. Essential hypertension    7. Steatosis of liver    8. Dyslipidemia        PLAN:    1. For her anxiety/depression, she will be treated with Wellbutrin  mg daily. 2. She will follow up with her bariatric surgeon, Dr. Isabelle Alvarez. I encouraged her to lose weight, which can be accomplished by eating meals, eliminating snacks and avoiding the consumption of processed carbohydrates. 3. She does have a history of hypothyroidism and TSH will be assessed for efficacy and compliance. 4. Her blood pressure is actually normal.  I will therefore continue the Hydrochlorothiazide and will conjugate it with Spironolactone. 5. For steatosis, weight reduction needs to occur, which will soon happen after her bariatric intervention. 6. She also has pain in her fingers, as well as her neck, which she attributes to arthritis. She is using Naproxen, which has been quite effective and I suggest she continue.     .  Orders Placed This Encounter    APOLIPOPROTEIN B    CBC WITH AUTOMATED DIFF    CRP, HIGH SENSITIVITY    LIPID PANEL    METABOLIC PANEL, COMPREHENSIVE    URINALYSIS W/ RFLX MICROSCOPIC    TSH 3RD GENERATION    spironolactone-hydrochlorothiazide (ALDACTAZIDE) 25-25 mg per tablet    buPROPion XL (WELLBUTRIN XL) 150 mg tablet         Follow-up and Dispositions    · Return in about 2 months (around 1/10/2021).            Shima Bay MD

## 2020-11-10 NOTE — PROGRESS NOTES
Pre-operative Bariatric Nutrition Evaluation ()     Date: 11/10/2020   Cecily Arteaga M.D. Name: Sheeba Reece  :  1975  Age:  54  Gender: Female   Type of Surgery: [x]           Gastric Bypass   []           Sleeve Gastrectomy    ASSESSMENT:    Past Medical History:HTN, fatty liver, osteoarthritis, GERD, sleep apnea, anxiety, hypothyroidism      Medications/Supplements:   Prior to Admission medications    Medication Sig Start Date End Date Taking? Authorizing Provider   hydroCHLOROthiazide (HYDRODIURIL) 25 mg tablet Take 25 mg by mouth daily. Provider, Historical   atropine-PHENobarbital-scopolamine-hyoscyamine (DONNATAL) 16.2-0.1037 -0.0194 mg per tablet Take 1 Tab by mouth every six (6) hours as needed for Pain. 18   Dax Hwang MD   famotidine (PEPCID) 20 mg tablet Take 1 Tab by mouth two (2) times a day. 18   Dax Hwang MD   levothyroxine (SYNTHROID) 175 mcg tablet Take 1 Tab by mouth Daily (before breakfast). 18   Kady Dietrich PA-C       Food Allergies/Intolerances:none     Anthropometrics:    Ht:65\"   Recent Office Wt: 293#    IBW: 125#    %IBW: 234%     BMI:48    Category: obesity III     Reported wt history: Pt completing pre-op nutrition evaluation for wt loss surgery over the phone d/t social distancing guidelines d/t COVID-19. Reports lowest adult BW of 231# and highest adult BW of 297#. Attributes wt gain over the years r/t hypothyroidism and poor eating habits and emotional eating r/t anxiety. Has attempted wt loss through various methods with most successful wt loss of 10#. Has been unable to maintain long term or significant wt loss and is now seeking approval for weight loss surgery. Pt reports multiple healthy lifestyle changes implemented since first office visit with Dr. Jany Reyes. Pt will need to complete 6 months of supervised weight loss for insurance requirements.      Exercise/Physical Activity:recently started walking for exercise     Reported Diet History:physician prescribed diet, exercise     24 Hour Diet Recall  Breakfast  Skips or cereal or coffee    Lunch  Salad with protein    Dinner  Salad    Snacks  Yogurt    Beverages  Water; h/o soda and juice        Environment/Psychosocial/Support:Pt works full time as a  at [employer]. Pt reports good support from mother, boyfriend and sister. Pt does her own grocery shopping and cooking and somewhat shared with boyfriend. Pt states her anxiety has worsened over the past few months r/t losing her job back in March and being diagnosed with COVID-19. NUTRITION DIAGNOSIS:  1. Self-monitoring deficit r/t multiple etiologies evidenced by pt skips meals. 2. Food and nutrition related knowledge deficit r/t lack of prior exposure to information evidenced by pt seeking nutrition education for gastric bypass. NUTRITION INTERVENTION:  Pt educated on nutrition recommendations for weight loss surgery, specifically gastric bypass. Instructed on consuming 3 meals per day starting now. Use the balanced plate method to plan meals, include 3 oz of lean source of protein, 1/2 cup whole grains, unlimited non-starchy vegetables, 1/2 cup fruit and 1 serving of low fat dairy. Utilize handouts listing healthy snack and meal ideas to limit restaurant meals. After surgery measure all meals to 1/2 cup. Each meal will contain a 1/4 cup lean protein and 1/4 cup fruit, non-starchy vegetable or starch (limiting to once per day). Aim for 60 g protein per day. Sip on 48-64 oz of sugar free, calorie free, non-carbonated beverages each day. Do not use a straw. Do not consume beverages 30 minutes before, during or 30 minutes after meals. Read all nutrition labels. Demonstrated and emphasized identifying serving size, total fat, sugar and protein content. Defined low fat as </= 3 g per serving. Discussed lean and extra lean sources of protein.  Provided list of low fat cooking methods. Avoid foods with sugar listed in the first 3 ingredients and >/15 g sugar per serving. Excess sugar/fat intake may lead to dumping syndrome. Discussed signs and symptoms of dumping syndrome. Practice mindful eating habits; take small bites, chew thoroughly, avoid distractions, utilize hunger/fullness scale. Consume meals over 20-30 minutes. Attend Bariatric Support Group and increase physical activity (approved per MD) for long term weight maintenance. NUTRITION MONITORING AND EVALUATION:    The following goals were established with patient;  1. Eat 3 meals a day. Do not skip meals. Use protein shakes PRN. Incorporate lean protein foods at all 3 meals. 2. Continue to drink mostly calorie-free, sugar-free and non-carbonated fluids. 3. Continue counseling/treatment for anxiety to better manage emotional eating behaviors. Consider attendance at upcoming support group. 4. Continue walking for exercise as tolerated. 5. Follow up next month for continued nutrition education and supervised weight loss. Specific tips and techniques to facilitate compliance with above recommendations were provided and discussed. Nutrition evaluation reveals pt is actively making healthy lifestyle changes with continued changes indicated. Goals set and recommendations made. Will continue to assess. If further details are desired please feel free to contact me at [phone]. This phone number was also provided to the patient for any further questions or concerns.            Judith Villegas RD

## 2020-11-12 LAB
ALBUMIN SERPL-MCNC: 4.4 G/DL (ref 3.8–4.8)
ALBUMIN/GLOB SERPL: 1.6 {RATIO} (ref 1.2–2.2)
ALP SERPL-CCNC: 60 IU/L (ref 39–117)
ALT SERPL-CCNC: 16 IU/L (ref 0–32)
APO B SERPL-MCNC: 86 MG/DL
APPEARANCE UR: CLEAR
AST SERPL-CCNC: 19 IU/L (ref 0–40)
BASOPHILS # BLD AUTO: 0.1 X10E3/UL (ref 0–0.2)
BASOPHILS NFR BLD AUTO: 1 %
BILIRUB SERPL-MCNC: 0.2 MG/DL (ref 0–1.2)
BILIRUB UR QL STRIP: NEGATIVE
BUN SERPL-MCNC: 11 MG/DL (ref 6–24)
BUN/CREAT SERPL: 13 (ref 9–23)
CALCIUM SERPL-MCNC: 9.2 MG/DL (ref 8.7–10.2)
CHLORIDE SERPL-SCNC: 101 MMOL/L (ref 96–106)
CHOLEST SERPL-MCNC: 159 MG/DL (ref 100–199)
CO2 SERPL-SCNC: 29 MMOL/L (ref 20–29)
COLOR UR: YELLOW
CREAT SERPL-MCNC: 0.85 MG/DL (ref 0.57–1)
CRP SERPL HS-MCNC: 18.8 MG/L (ref 0–3)
EOSINOPHIL # BLD AUTO: 0.2 X10E3/UL (ref 0–0.4)
EOSINOPHIL NFR BLD AUTO: 2 %
ERYTHROCYTE [DISTWIDTH] IN BLOOD BY AUTOMATED COUNT: 12.8 % (ref 11.7–15.4)
GLOBULIN SER CALC-MCNC: 2.8 G/DL (ref 1.5–4.5)
GLUCOSE SERPL-MCNC: 76 MG/DL (ref 65–99)
GLUCOSE UR QL: NEGATIVE
HCT VFR BLD AUTO: 43.2 % (ref 34–46.6)
HDLC SERPL-MCNC: 30 MG/DL
HGB BLD-MCNC: 14.6 G/DL (ref 11.1–15.9)
HGB UR QL STRIP: NEGATIVE
IMM GRANULOCYTES # BLD AUTO: 0 X10E3/UL (ref 0–0.1)
IMM GRANULOCYTES NFR BLD AUTO: 0 %
KETONES UR QL STRIP: NEGATIVE
LDLC SERPL CALC-MCNC: 85 MG/DL (ref 0–99)
LEUKOCYTE ESTERASE UR QL STRIP: NEGATIVE
LYMPHOCYTES # BLD AUTO: 3.2 X10E3/UL (ref 0.7–3.1)
LYMPHOCYTES NFR BLD AUTO: 31 %
MCH RBC QN AUTO: 29.9 PG (ref 26.6–33)
MCHC RBC AUTO-ENTMCNC: 33.8 G/DL (ref 31.5–35.7)
MCV RBC AUTO: 88 FL (ref 79–97)
MICRO URNS: NORMAL
MONOCYTES # BLD AUTO: 1.1 X10E3/UL (ref 0.1–0.9)
MONOCYTES NFR BLD AUTO: 11 %
NEUTROPHILS # BLD AUTO: 5.7 X10E3/UL (ref 1.4–7)
NEUTROPHILS NFR BLD AUTO: 55 %
NITRITE UR QL STRIP: NEGATIVE
PH UR STRIP: 6 [PH] (ref 5–7.5)
PLATELET # BLD AUTO: 273 X10E3/UL (ref 150–450)
POTASSIUM SERPL-SCNC: 4.3 MMOL/L (ref 3.5–5.2)
PROT SERPL-MCNC: 7.2 G/DL (ref 6–8.5)
PROT UR QL STRIP: NEGATIVE
RBC # BLD AUTO: 4.89 X10E6/UL (ref 3.77–5.28)
SODIUM SERPL-SCNC: 141 MMOL/L (ref 134–144)
SP GR UR: 1.02 (ref 1–1.03)
TRIGL SERPL-MCNC: 267 MG/DL (ref 0–149)
TSH SERPL DL<=0.005 MIU/L-ACNC: 4.72 UIU/ML (ref 0.45–4.5)
UROBILINOGEN UR STRIP-MCNC: 0.2 MG/DL (ref 0.2–1)
VLDLC SERPL CALC-MCNC: 44 MG/DL (ref 5–40)
WBC # BLD AUTO: 10.2 X10E3/UL (ref 3.4–10.8)

## 2020-11-24 ENCOUNTER — OFFICE VISIT (OUTPATIENT)
Dept: INTERNAL MEDICINE CLINIC | Age: 45
End: 2020-11-24
Payer: MEDICAID

## 2020-11-24 VITALS
TEMPERATURE: 98.1 F | DIASTOLIC BLOOD PRESSURE: 89 MMHG | HEIGHT: 65 IN | OXYGEN SATURATION: 93 % | RESPIRATION RATE: 16 BRPM | HEART RATE: 77 BPM | WEIGHT: 288 LBS | BODY MASS INDEX: 47.98 KG/M2 | SYSTOLIC BLOOD PRESSURE: 136 MMHG

## 2020-11-24 DIAGNOSIS — M25.541 ARTHRALGIA OF BOTH HANDS: ICD-10-CM

## 2020-11-24 DIAGNOSIS — G47.00 INSOMNIA, UNSPECIFIED TYPE: ICD-10-CM

## 2020-11-24 DIAGNOSIS — F41.9 ANXIETY: Primary | ICD-10-CM

## 2020-11-24 DIAGNOSIS — Z23 ENCOUNTER FOR IMMUNIZATION: ICD-10-CM

## 2020-11-24 DIAGNOSIS — E03.9 ACQUIRED HYPOTHYROIDISM: ICD-10-CM

## 2020-11-24 DIAGNOSIS — M25.542 ARTHRALGIA OF BOTH HANDS: ICD-10-CM

## 2020-11-24 DIAGNOSIS — Z01.818 PREOP TESTING: ICD-10-CM

## 2020-11-24 DIAGNOSIS — R00.2 PALPITATIONS: ICD-10-CM

## 2020-11-24 DIAGNOSIS — Z12.31 ENCOUNTER FOR SCREENING MAMMOGRAM FOR MALIGNANT NEOPLASM OF BREAST: ICD-10-CM

## 2020-11-24 PROCEDURE — 93000 ELECTROCARDIOGRAM COMPLETE: CPT | Performed by: FAMILY MEDICINE

## 2020-11-24 PROCEDURE — 90686 IIV4 VACC NO PRSV 0.5 ML IM: CPT | Performed by: FAMILY MEDICINE

## 2020-11-24 PROCEDURE — 90471 IMMUNIZATION ADMIN: CPT | Performed by: FAMILY MEDICINE

## 2020-11-24 PROCEDURE — 99215 OFFICE O/P EST HI 40 MIN: CPT | Performed by: FAMILY MEDICINE

## 2020-11-24 RX ORDER — OMEGA-3-ACID ETHYL ESTERS 1 G/1
2 CAPSULE, LIQUID FILLED ORAL 2 TIMES DAILY WITH MEALS
Qty: 60 CAP | Refills: 2 | Status: CANCELLED | OUTPATIENT
Start: 2020-11-24

## 2020-11-24 RX ORDER — DULOXETIN HYDROCHLORIDE 20 MG/1
20 CAPSULE, DELAYED RELEASE ORAL 2 TIMES DAILY
Qty: 60 CAP | Refills: 2 | Status: SHIPPED | OUTPATIENT
Start: 2020-11-24 | End: 2021-06-21

## 2020-11-24 NOTE — PROGRESS NOTES
SPORTS MEDICINE AND PRIMARY CARE  Matthew Terry. MD Josy  [address] [address]    Chief Complaint   Patient presents with    Anxiety     Patient is here for anxiety.  Labs     Patient is here to also follow up on her lad results. SUBJECTIVE:    Pérez Dooley is a 54 y.o. female  here to review labs and discuss anxiety following a physical she had 2 weeks ago. Anxiety is chronic. She was begun on Wellbutrin  mg at last visit but developed worsening insomnia and she weaned off of it. She has had a Zoloft failure. She had side effects on trazodone. Atarax was helpful in the past. Previous psychiatrist was Dr. Shaun Escamilla. Pt mentions past treatment for attention deficit disorder. Patient has chronic arthralgias involving her hands. Patient does not endorse finger swelling /warmth, fever or fatigue. First-degree relative has JRA and systemic lupus. Elevates CRP concerns patient in this regard. Patient has palpitations that are chronic but worsened after COVID-19 in July. She does not endorse chest pain or shortness of breath or leg edema. She drinks an average amount of coffee a day. Patient is concerned about blood work results after reviewing an elevated triglyceride level and elevated CRP and elevated TSH. She admits compliance with Synthroid 175 mcg daily for hypothyroidism. Past medical history of hypertension, acid reflux steatosis obstructive sleep apnea OCD and ADD. She has neck pain and naproxen is helpful for that. Current Outpatient Medications   Medication Sig Dispense Refill    spironolactone-hydrochlorothiazide (ALDACTAZIDE) 25-25 mg per tablet Take 1 Tab by mouth daily. 30 Tab 11    levothyroxine (SYNTHROID) 175 mcg tablet Take 1 Tab by mouth Daily (before breakfast). 90 Tab 1    atropine-PHENobarbital-scopolamine-hyoscyamine (DONNATAL) 16.2-0.1037 -0.0194 mg per tablet Take 1 Tab by mouth every six (6) hours as needed for Pain.  20 Tab 0    famotidine (PEPCID) 20 mg tablet Take 1 Tab by mouth two (2) times a day. 20 Tab 0     Past Medical History:   Diagnosis Date    ADD (attention deficit disorder)     Anxiety     Carpal tunnel syndrome     left    DJD (degenerative joint disease)     Endocrine disease     hypothyroidism    H. PYLORI INFECTION     treated with triple therapy in Fall 2008    Hypertension     Sleep apnea     Unspecified hypothyroidism      Past Surgical History:   Procedure Laterality Date    EXCIS BARTHOLIN GLAND/CYST      HX GYN      Bartholin cyst removal    HX TUBAL LIGATION       Allergies   Allergen Reactions    Darvocet A500 [Propoxyphene N-Acetaminophen] Hives    Sertraline Other (comments)     Intolerance       REVIEW OF SYSTEMS:  Pertinent positives and negatives are listed in HPI        Social History     Socioeconomic History    Marital status: SINGLE     Spouse name: Not on file    Number of children: 11    Years of education: Not on file    Highest education level: Not on file   Occupational History    Occupation: [employer] -    Tobacco Use    Smoking status: Never Smoker    Smokeless tobacco: Never Used   Substance and Sexual Activity    Alcohol use: No    Drug use: No    Sexual activity: Never   Other Topics Concern    Exercise Yes     Comment: Walking 4 miles/day to/from work. Social History Narrative    Living with 25 yo daughter and 2 grandchildren in [facility]. No pets in the house. 21, 25, 15, 5 yo children - 2 youngest children live in [facility] with pt's mother.       Family History   Problem Relation Age of Onset    Hypertension Mother     Hypertension Father     Stroke Father         age 79    Thyroid Disease Sister         hypothyroidism    Arthritis-rheumatoid Sister        OBJECTIVE:     Visit Vitals  /89   Pulse 77   Temp 98.1 °F (36.7 °C)   Resp 16   Ht 5' 5\" (1.651 m)   Wt 288 lb (130.6 kg)   SpO2 93%   BMI 47.93 kg/m²     ECG: Sinus rhythm, T wave change    CONSTITUTIONAL: appears in no acute distress  NECK: supple, no thyromegaly  RESPIRATORY: Chest: clear bilaterally  CARDIOVASCULAR: Heart: regular rate and rhythm  MUSCULOSKELETAL: Extremities: no edema   INTEGUMENT: Warm and dry. MENTAL STATUS: alert and oriented, appropriate affect       ASSESSMENT:   1. Anxiety    2. Arthralgia of both hands    3. Acquired hypothyroidism    4. Insomnia, unspecified type    5. Palpitations    6. Preop testing    7. Encounter for screening mammogram for malignant neoplasm of breast    8. Hypertension controlled  9. GERD stable  10. ADD not being treated currently  11. Carpal tunnel syndrome History    I have discussed the diagnosis with the patient and the intended plan as seen in the  orders. The patient understands and agrees with the plan. The patient has   received an after visit summary and questions were answered concerning  future plans  Patient labs and/or xrays were reviewed  Past records were reviewed. PLAN:  .  Orders Placed This Encounter    ISAIAH MAMMO BI SCREENING INCL CAD    T4, FREE determination prior to adjusting Synthroid in light of elevated TSH    RHEUMATOID FACTOR, QL to determine likelihood of rheumatoid arthritis with hand pain complaint    MARIA BY MULTIPLEX FLOW IA, QL to determine likelihood of SLE considering joint aches and positive family history    SED RATE (ESR)    URIC ACID to determine likelihood of gout    REFERRAL TO CARDIOLOGY for evaluation of palpitations and any potential Covid related heart disease and in preparation for gastric bypass procedure    AMB POC EKG ROUTINE W/ 12 LEADS, INTER & REP       Follow-up and Dispositions    · Return in about 4 weeks (around 12/22/2020) for medication folllow up.        Counseled regarding diet, exercise and healthy lifestyle        Advised patient to call back or return to office if symptoms worsen/change/persist.  Discussed expected course/resolution/complications of diagnosis in detail with patient. Medication risks/benefits/costs/interactions/alternatives discussed with patient    Susie Loera M.D. A total of at least 40  min was spent during this evaluation of which half was spent in counseling and care coordination    This note was created using voice recognition software.   Edits have been made but syntax errors might exist.

## 2020-11-24 NOTE — PROGRESS NOTES
Chief Complaint   Patient presents with    Anxiety     Patient is here for anxiety.  Labs     Patient is here to also follow up on her lad results. 1. Have you been to the ER, urgent care clinic since your last visit? Hospitalized since your last visit? No    2. Have you seen or consulted any other health care providers outside of the [address] [place] [address] since your last visit? Include any pap smears or colon screening.  No

## 2020-11-24 NOTE — PATIENT INSTRUCTIONS
ZOLOFT WEAN : 
 
WEEK 1: TAKE 1 EVERY OTHER DAY (M-W-F-Sunday) WEEK 2: TAKE 1 EVERY 3 DAYS ( Sunday, Thursday) WEEK 3: TAKE 1 T Sunday THEN STOP Hyperlipidemia: After Your Visit Your Care Instructions Hyperlipidemia is too much fat in your blood. The body has several kinds of fat, including cholesterol and triglycerides. Your body needs fat for many things, such as making new cells. But too much fat in your blood increases your chances of having a heart attack or stroke. You may be able to lower your cholesterol and triglycerides with a heart-healthy diet, exercise, and if needed, medicine. Your doctor may want you to try lifestyle changes first to see whether they lower the fat in your blood. You may need to take medicine if lifestyle changes do not lower the fat in your blood enough. Follow-up care is a key part of your treatment and safety. Be sure to make and go to all appointments, and call your doctor if you are having problems. Its also a good idea to know your test results and keep a list of the medicines you take. How can you care for yourself at home? Take your medicines · Take your medicines exactly as prescribed. Call your doctor if you think you are having a problem with your medicine. · If you take medicine to lower your cholesterol, go to follow-up visits. You will need to have blood tests. · Do not take large doses of niacin, which is a B vitamin, while taking medicine called statins. It may increase the chance of muscle pain and liver problems. · Talk to your doctor about avoiding grapefruit juice if you are taking statins. Grapefruit juice can raise the level of this medicine in your blood. This could increase side effects. Eat more fruits, vegetables, and fiber · Fruits and vegetables have lots of nutrients that help protect against heart disease, and they have littleif anyfat. Try to eat at least five servings a day.  Dark green, deep orange, or yellow fruits and vegetables are healthy choices. · Keep carrots, celery, and other veggies handy for snacks. Buy fruit that is in season and store it where you can see it so that you will be tempted to eat it. Cook dishes that have a lot of veggies in them, such as stir-fries and soups. · Foods high in fiber may reduce your cholesterol and provide important vitamins and minerals. High-fiber foods include whole-grain cereals and breads, oatmeal, beans, brown rice, citrus fruits, and apples. · Buy whole-grain breads and cereals instead of white bread and pastries. Limit saturated fat · Read food labels and try to avoid saturated fat and trans fat. They increase your risk of heart disease. · Use olive or canola oil when you cook. Try cholesterol-lowering spreads, such as Benecol or Take Control. · Bake, broil, grill, or steam foods instead of frying them. · Limit the amount of high-fat meats you eat, including hot dogs and sausages. Cut out all visible fat when you prepare meat. · Eat fish, skinless poultry, and soy products such as tofu instead of high-fat meats. Soybeans may be especially good for your heart. Eat at least two servings of fish a week. Certain fish, such as salmon, contain omega-3 fatty acids, which may help reduce your risk of heart attack. · Choose low-fat or fat-free milk and dairy products. Get exercise, limit alcohol, and quit smoking · Get more exercise. Work with your doctor to set up an exercise program. Even if you can do only a small amount, exercise will help you get stronger, have more energy, and manage your weight and your stress. Walking is an easy way to get exercise. Gradually increase the amount you walk every day. Aim for at least 30 minutes on most days of the week. You also may want to swim, bike, or do other activities. · Limit alcohol to no more than 2 drinks a day for men and 1 drink a day for women. · Do not smoke.  If you need help quitting, talk to your doctor about stop-smoking programs and medicines. These can increase your chances of quitting for good. When should you call for help? Call 911 anytime you think you may need emergency care. For example, call if: 
· You have symptoms of a heart attack. These may include: ¨ Chest pain or pressure, or a strange feeling in the chest. 
¨ Sweating. ¨ Shortness of breath. ¨ Nausea or vomiting. ¨ Pain, pressure, or a strange feeling in the back, neck, jaw, or upper belly or in one or both shoulders or arms. ¨ Lightheadedness or sudden weakness. ¨ A fast or irregular heartbeat. After you call 911, the  may tell you to chew 1 adult-strength or 2 to 4 low-dose aspirin. Wait for an ambulance. Do not try to drive yourself. · You have signs of a stroke. These may include: 
¨ Sudden numbness, paralysis, or weakness in your face, arm, or leg, especially on only one side of your body. ¨ New problems with walking or balance. ¨ Sudden vision changes. ¨ Drooling or slurred speech. ¨ New problems speaking or understanding simple statements, or feeling confused. ¨ A sudden, severe headache that is different from past headaches. · You passed out (lost consciousness). Call your doctor now or seek immediate medical care if: 
· You have muscle pain or weakness. Watch closely for changes in your health, and be sure to contact your doctor if: 
· You are very tired. · You have an upset stomach, gas, constipation, or belly pain or cramps. Where can you learn more? Go to Meebler.be Enter C406 in the search box to learn more about \"Hyperlipidemia: After Your Visit. \"  
© 4131-4750 Healthwise, Incorporated. Care instructions adapted under license by [facility] (which disclaims liability or warranty for this information).  This care instruction is for use with your licensed healthcare professional. If you have questions about a medical condition or this instruction, always ask your healthcare professional. [address] any warranty or liability for your use of this information. Content Version: 8.2.843911; Last Revised: October 13, 2011 Cholesterol and Triglycerides Tests: About These Tests What are they? Cholesterol and triglycerides tests measure the amount of fats in your blood. These fats have both \"good\" (HDL) and \"bad\" (LDL) cholesterol. Why are these tests done? These tests are done to help find out your risk of a heart attack and stroke. Your doctor uses your cholesterol levels plus other things such as blood pressure, age, and sex to calculate your risk. These tests also help your doctor find out how well medicine is working for some health problems. How do you prepare for these tests? · Your doctor may tell you to fast before your tests. This means that you do not eat or drink anything except water for 9 to 12 hours before the tests. In most cases, you can take your medicines with water the morning of the test. 
· Do not eat high-fat foods the night before the tests. · Do not drink alcohol or do intense exercise the night before the tests. · Tell your doctor ALL the medicines, vitamins, supplements, and herbal remedies you take. Some may increase the risk of problems during your test. Your doctor will tell you if you should stop taking any of them before the test and how soon to do it. How are these tests done? A health professional uses a needle to take a blood sample, usually from the arm. Where can you learn more? Go to http://www.gray.com/ Enter L505 in the search box to learn more about \"Cholesterol and Triglycerides Tests: About These Tests. \" Current as of: December 16, 2019               Content Version: 12.6 © 9023-2773 [employer].   
Care instructions adapted under license by [employer] (which disclaims liability or warranty for this information). If you have questions about a medical condition or this instruction, always ask your healthcare professional. [address] any warranty or liability for your use of this information. Omega-3-Acid Ethyl Esters (By mouth) Omega-3-Acid Ethyl Esters (oh-MAY-ga-3-AS-id [facility]-il ES-ters) Lowers high triglyceride levels. Brand Name(s): Lovaza, Omega-3 D-3 Wellness Pack Kit, Sure Result O3D3 System, [employer] Scripts There may be other brand names for this medicine. When This Medicine Should Not Be Used: This medicine is not right for everyone. Do not use it if you had an allergic reaction to omega-3-acid ethyl esters. How to Use This Medicine:  
Liquid Filled Capsule · Your doctor will tell you how much medicine to use. Do not use more than directed. Take this medicine at the same time each day. · It is best to take this medicine with food or milk. · Swallow the capsule whole. Do not break, crush, chew, dissolve, or open the capsule. Do not let this medicine come into contact with foam plastic cups (Styrofoam). · Read and follow the patient instructions that come with this medicine. Talk to your doctor or pharmacist if you have any questions. · Missed dose: Take a dose as soon as you remember. If it is almost time for your next dose, wait until then and take a regular dose. Do not take extra medicine to make up for a missed dose. · Store the medicine in a closed container at room temperature, away from heat, moisture, and direct light. Drugs and Foods to Avoid: Ask your doctor or pharmacist before using any other medicine, including over-the-counter medicines, vitamins, and herbal products. · Some medicines can affect how omega-3-acid ethyl esters work. Tell your doctor if you are using any of the following: ¨ Clopidogrel ¨ An NSAID pain or arthritis medicine (such as aspirin, diclofenac, ibuprofen, naproxen) ¨ A blood thinner (such as warfarin) Warnings While Using This Medicine: · Tell your doctor if you are pregnant or breastfeeding, if you have liver disease, an underactive thyroid, a heart rhythm problem, or an allergy to fish or shellfish. · Your doctor will do lab tests at regular visits to check on the effects of this medicine. Keep all appointments. · Keep all medicine out of the reach of children. Never share your medicine with anyone. Possible Side Effects While Using This Medicine:  
Call your doctor right away if you notice any of these side effects: · Allergic reaction: Itching or hives, swelling in your face or hands, swelling or tingling in your mouth or throat, chest tightness, trouble breathing · Fast or uneven heartbeat, dizziness, fainting · Unusual bleeding or bruising If you notice these less serious side effects, talk with your doctor: · Burping, stomach upset · Change in taste If you notice other side effects that you think are caused by this medicine, tell your doctor. Call your doctor for medical advice about side effects. You may report side effects to FDA at 0-685-FDA-6722 © 2017 [facility] Information is for End User's use only and may not be sold, redistributed or otherwise used for commercial purposes. The above information is an  only. It is not intended as medical advice for individual conditions or treatments. Talk to your doctor, nurse or pharmacist before following any medical regimen to see if it is safe and effective for you. Duloxetine (By mouth) Duloxetine (doo-LOX-e-teen) Treats depression, anxiety, diabetic peripheral neuropathy, fibromyalgia, and chronic muscle or bone pain. This medicine is an SSNRI. Brand Name(s): Cymbalta, DermacinRx DPN Christiano Mask There may be other brand names for this medicine. When This Medicine Should Not Be Used: This medicine is not right for everyone.  Do not use it if you had an allergic reaction to duloxetine. How to Use This Medicine:  
Capsule, Delayed Release Capsule · Take your medicine as directed. Your dose may need to be changed several times to find what works best for you. · Delayed-release capsule: Swallow the capsule whole. Do not crush, chew, break, or open it. · This medicine should come with a Medication Guide. Ask your pharmacist for a copy if you do not have one. · Missed dose: Take a dose as soon as you remember. If it is almost time for your next dose, wait until then and take a regular dose. Do not take extra medicine to make up for a missed dose. · Store the medicine in a closed container at room temperature, away from heat, moisture, and direct light. Drugs and Foods to Avoid: Ask your doctor or pharmacist before using any other medicine, including over-the-counter medicines, vitamins, and herbal products. · Do not take duloxetine if you have used an MAO inhibitor (MAOI) within the past 14 days. Do not start taking an MAO inhibitor within 5 days of stopping duloxetine. · Some medicines can affect how duloxetine works. Tell your doctor if you are using any of the following: 
¨ Buspirone, cimetidine, ciprofloxacin, enoxacin, fentanyl, lithium, Ron's wort, theophylline, tramadol, tryptophan, or warfarin ¨ Amphetamines ¨ Blood pressure medicine ¨ Diuretic (water pill) ¨ Medicine for heart rhythm problems (including flecainide, propafenone, quinidine) ¨ Medicine to treat migraine headaches (including triptans) ¨ NSAID pain or arthritis medicine (including aspirin, celecoxib, diclofenac, ibuprofen, naproxen) ¨ Other medicine to treat depression or mood disorders (including amitriptyline, desipramine, fluoxetine, imipramine, nortriptyline, paroxetine) ¨ Phenothiazine medicine (including thioridazine) · Tell your doctor if you use anything else that makes you sleepy. Some examples are allergy medicine, narcotic pain medicine, and alcohol. · Do not drink alcohol while you are using this medicine. Warnings While Using This Medicine: · Tell your doctor if you are pregnant or breastfeeding, or if you have kidney disease, liver disease, diabetes, digestion problems, glaucoma, heart disease, high or low blood pressure, or problems with urination. Tell your doctor if you smoke or you have a history of seizures, or drug or alcohol addiction. · This medicine may cause the following problems:  
¨ Serious liver problems ¨ Serotonin syndrome (more likely when used with certain other medicines) ¨ Increased risk of bleeding problems ¨ Serious skin reactions ¨ Low sodium levels in the blood · This medicine can increase thoughts of suicide. Tell your doctor right away if you start to feel depressed and have thoughts about hurting yourself. · This medicine can cause changes in your blood pressure. This may make you dizzy or drowsy. Do not drive or do anything that could be dangerous until you know how this medicine affects you. Stand up slowly to avoid falls. · Do not stop using this medicine suddenly. Your doctor will need to slowly decrease your dose before you stop it completely. · Your doctor will check your progress and the effects of this medicine at regular visits. Keep all appointments. · Keep all medicine out of the reach of children. Never share your medicine with anyone. Possible Side Effects While Using This Medicine:  
Call your doctor right away if you notice any of these side effects: · Allergic reaction: Itching or hives, swelling in your face or hands, swelling or tingling in your mouth or throat, chest tightness, trouble breathing · Anxiety, restlessness, fever, fast heartbeat, sweating, muscle spasms, diarrhea, seeing or hearing things that are not there · Blistering, peeling, red skin rash · Confusion, weakness, muscle twitching · Dark urine or pale stools, nausea, vomiting, loss of appetite, stomach pain, yellow skin or eyes · Decrease in how much or how often you urinate · Eye pain, vision changes, seeing halos around lights · Feeling more energetic than usual 
· Lightheadedness, dizziness, or fainting · Unusual moods or behaviors, worsening depression, thoughts about hurting yourself, trouble sleeping · Unusual bleeding or bruising If you notice these less serious side effects, talk with your doctor: · Decrease in appetite or weight · Dry mouth, constipation, mild nausea · Unusual drowsiness, sleepiness, or tiredness If you notice other side effects that you think are caused by this medicine, tell your doctor. Call your doctor for medical advice about side effects. You may report side effects to FDA at 6-518-FDA-9310 © [address] Information is for End User's use only and may not be sold, redistributed or otherwise used for commercial purposes. The above information is an  only. It is not intended as medical advice for individual conditions or treatments. Talk to your doctor, nurse or pharmacist before following any medical regimen to see if it is safe and effective for you.

## 2020-11-25 ENCOUNTER — TRANSCRIBE ORDER (OUTPATIENT)
Dept: SCHEDULING | Age: 45
End: 2020-11-25

## 2020-11-25 ENCOUNTER — VIRTUAL VISIT (OUTPATIENT)
Dept: SLEEP MEDICINE | Age: 45
End: 2020-11-25
Payer: MEDICAID

## 2020-11-25 DIAGNOSIS — Z12.31 VISIT FOR SCREENING MAMMOGRAM: Primary | ICD-10-CM

## 2020-11-25 DIAGNOSIS — G47.33 OSA (OBSTRUCTIVE SLEEP APNEA): Primary | ICD-10-CM

## 2020-11-25 DIAGNOSIS — E79.0 ELEVATED URIC ACID IN BLOOD: Primary | ICD-10-CM

## 2020-11-25 DIAGNOSIS — E66.01 MORBID OBESITY WITH BMI OF 45.0-49.9, ADULT (HCC): ICD-10-CM

## 2020-11-25 PROCEDURE — 99204 OFFICE O/P NEW MOD 45 MIN: CPT | Performed by: SPECIALIST

## 2020-11-25 RX ORDER — ALLOPURINOL 100 MG/1
100 TABLET ORAL DAILY
Qty: 30 TAB | Refills: 0 | Status: SHIPPED | OUTPATIENT
Start: 2020-11-25 | End: 2020-12-28 | Stop reason: ALTCHOICE

## 2020-11-25 NOTE — PROGRESS NOTES
[address] [place], [address]  Tel.  [phone]  Fax. [address] [facility] [address]  [place], [address] [facility]  Tel.  [phone]  Fax. [phone] [address] [facility] Zenaida Huerta   Tel.  [phone]  Fax. [phone]     Brandy Olivera is a 54 y.o. female who was seen by synchronous (real-time) audio-video technology on 11/25/2020. Consent:  She and/or her healthcare decision maker is aware that this patient-initiated Telehealth encounter is a billable service, with coverage as determined by her insurance carrier. She is aware that she may receive a bill and has provided verbal consent to proceed: Yes    I was in the office while conducting this encounter. Chief Complaint       No chief complaint on file. HPI      Brandy Olivera is 54 y.o. female seen for evaluation of a sleep disorder. Patient notes a history of sleep difficulties. She is being evaluated for bariatric surgery. She relates having had an evaluation approximately 10 years ago, potentially at [facility]. She was not started on CPAP at that time. She has gained 50-60 pounds since that initial assessment. She notes continued difficulties with sleep initiation and maintenance. She will frequently \"toss and turn\". She may awaken 7 or more times from sleep. She has been told of snoring, associated apnea, leg kicking. She reports vivid dreams. She denies sleep talking or sleepwalking, bruxism or nocturnal incontinence, abnormal arm or leg movements, hypnagogic hallucinations, sleep paralysis or cataplexy. She may doze if she is seated quietly after lunch or riding as a passenger.           [place] Sleepiness Scale: 9  Modified FOSQ: 11.5    Allergies   Allergen Reactions    Darvocet A500 [Propoxyphene N-Acetaminophen] Hives    Sertraline Other (comments)     Intolerance       Current Outpatient Medications   Medication Sig Dispense Refill    DULoxetine (CYMBALTA) 20 mg capsule Take 1 Cap by mouth two (2) times a day. INDICATION: ANXIETY AND DEPRESSION 60 Cap 2    spironolactone-hydrochlorothiazide (ALDACTAZIDE) 25-25 mg per tablet Take 1 Tab by mouth daily. 30 Tab 11    atropine-PHENobarbital-scopolamine-hyoscyamine (DONNATAL) 16.2-0.1037 -0.0194 mg per tablet Take 1 Tab by mouth every six (6) hours as needed for Pain. 20 Tab 0    famotidine (PEPCID) 20 mg tablet Take 1 Tab by mouth two (2) times a day. 20 Tab 0    levothyroxine (SYNTHROID) 175 mcg tablet Take 1 Tab by mouth Daily (before breakfast). 90 Tab 1        She  has a past medical history of ADD (attention deficit disorder), Anxiety, Carpal tunnel syndrome, DJD (degenerative joint disease), Endocrine disease, H. PYLORI INFECTION, Hypertension, Sleep apnea, and Unspecified hypothyroidism. She  has a past surgical history that includes pr excis bartholin gland/cyst; hx tubal ligation; and hx gyn. She family history includes Arthritis-rheumatoid in her sister; Hypertension in her father and mother; Stroke in her father; Thyroid Disease in her sister. She  reports that she has never smoked. She has never used smokeless tobacco. She reports that she does not drink alcohol or use drugs. Review of Systems:  Review of Systems   Constitutional: Negative for chills and fever. HENT: Positive for hearing loss and tinnitus. Eyes: Negative for blurred vision and double vision. Respiratory: Positive for shortness of breath. Cardiovascular: Positive for palpitations. Gastrointestinal: Positive for abdominal pain and heartburn. Genitourinary: Negative for frequency and urgency. Musculoskeletal: Positive for back pain, joint pain and neck pain. Neurological: Positive for headaches. Psychiatric/Behavioral: Positive for depression. The patient is nervous/anxious. Due to this being a telemedicine evaluation, certain elements of the physical examination are unable to be assessed.     Objective: Weight: 288 lb  BMI: 47.93    General:   Conversant, cooperative   Eyes:  no nystagmus   Oropharynx:    Tongue large                   Skin: no obvious rashes   Neuro:  Speech fluent, face symmetrical, tongue movement normal   Psych:  Normal affect,  normal countenance        Assessment:       ICD-10-CM ICD-9-CM    1. LONDON (obstructive sleep apnea)  G47.33 327.23    2. Morbid obesity with BMI of 45.0-49.9, adult ([facility])  E66.01 278.01     Z68.42 V85.42      Potential sleep disordered breathing. She will be evaluated with a home sleep test.  Results to be reviewed with her. Plan:     No orders of the defined types were placed in this encounter. * Patient has a history and examination consistent with the diagnosis of sleep apnea. * Home sleep testing was ordered for initial evaluation. * She was provided information on sleep apnea including corresponding risk factors and the importance of proper treatment. * Treatment options if indicated were reviewed today. Instructions:  o The patient would benefit from weight reduction measures. o Do not engage in activities requiring a normal degree of alertness if fatigue is present. o The patient understands that untreated or undertreated sleep apnea could impair judgement and the ability to function normally during the day.  o Call or return if symptoms worsen or persist.          Chloé Bay MD, [facility]  Electronically signed 11/25/20     Pursuant to the emergency declaration under the [facility], [facility] waiver authority and the [employer] and [employer] Act, this Virtual  Visit was conducted, with patient's consent, to reduce the patient's risk of exposure to COVID-19 and provide continuity of care for an established patient. Services were provided through a video synchronous discussion virtually to substitute for in-person clinic visit.     Brianna Salcedo MD     This note was created using voice recognition software. Despite editing, there may be syntax errors. This note will not be viewable in [address]     Visit duration: 12 min

## 2020-11-27 LAB
ANA SER QL: NEGATIVE
ERYTHROCYTE [SEDIMENTATION RATE] IN BLOOD: 35 MM/HR (ref 0–20)
RHEUMATOID FACT SERPL-ACNC: <10 IU/ML
T4 FREE SERPL-MCNC: 1.2 NG/DL (ref 0.8–1.5)
URATE SERPL-MCNC: 6.8 MG/DL (ref 2.6–6)

## 2020-11-29 RX ORDER — HYDROXYZINE HYDROCHLORIDE 10 MG/1
TABLET, FILM COATED ORAL
Qty: 60 TAB | Refills: 0 | Status: SHIPPED | OUTPATIENT
Start: 2020-11-29 | End: 2021-06-21

## 2020-12-08 ENCOUNTER — CLINICAL SUPPORT (OUTPATIENT)
Dept: SURGERY | Age: 45
End: 2020-12-08

## 2020-12-08 DIAGNOSIS — E66.01 MORBID OBESITY (HCC): Primary | ICD-10-CM

## 2020-12-09 NOTE — PROGRESS NOTES
[facility] at [facility]  Supervised Weight Loss     Date:   2020    Patient's Name: Marvin Og  : 1975    Insurance:  [email or web address]          Session: 2   6  Surgery: Gastric Bypass  Surgeon:  Franky Bloch, M.D. Height: 65\"   Reported Weight:    287      Lbs. BMI: 47   Pounds Lost since last month: 7#               Pounds Gained since last month: 0    Starting Weight: 293#   Previous Months Weight: 293#  Overall Pounds Lost: 7#  Overall Pounds Gained: 0    Other Pertinent Information: Today's appointment was completed in a virtual setting d/t COVID-19. Smoking Status:  none  Alcohol Intake: none    I have reviewed with pt the guidelines of the supervised wt loss program.  Pt understands the expectations of some wt loss during the program and that wt gain could delay the process. I have also explained that classes need to be consecutive. Missing a class may result in starting over. Pt has received this information in writing. Changes that patient has made since last month include:  More exercise, reduced sugar intake, eating breakfast.      Eating Habits and Behaviors  General healthy eating guidelines were discussed. A nutrition lesson specific to the importance of protein intake after surgery was provided. We discussed food sources of protein, protein supplements and multiple reasons as to why protein is important after bariatric surgery. Pts were instructed to focus on including protein at every meal and practice eating protein first at the meal. Pts were encouraged to sample a protein shake for tolerance. Patients were also instructed to use the balanced plate method for help with portion control and general healthy eating prior to surgery. We discussed measuring meals to 1/2 cup total per meal after surgery. Drinking only calorie-free, sugar-free and non-carbonated beverages.  We discussed the importance of drinking 64 ounces of fluid per day to prevent dehydration post-operatively. Patient's current diet habits include: Pt is eating 2-3 meals per day. Snack choices include fruit. Pt is eating refined carbohydrate foods (bread, pasta, rice, potatoes) 2-3 times per week. Pt is eating sweets/desserts none. Pt is using baked, grilled, broiled cooking methods. Pt is eating meals prepared outside of the home 1-3 times per week. Pt is drinking water, tea, soda. Pt reports sometimes emotional eating. Physical Activity/Exercise  We talked about the importance of increasing daily physical activity and beginning to develop an exercise regimen/routine. We talked about exercise as being an important part of long term weight loss after surgery. Comments:  During class, I discussed with patient the importance of getting into an exercise routine. Pt is currently walking 1-2 times per week for activity. Pt has been encouraged to maintain and increase as tolerated. Behavior Modification       We talked about how to eat more mindfully. Tips and recommendations for how to make these changes were provided. Pt was encouraged to keep a food journal and record what they were taking in daily. Overall Assessment: Pt demonstrates appropriate lifestyle changes evidenced by reported changes and reported wt loss. Will continue to assess. Patient-Set Goals:   1. Nutrition - no goals set   2. Exercise - no goals set  3.  Behavior -no goals set     Vida Lau RD  12/9/2020

## 2020-12-28 ENCOUNTER — TELEPHONE (OUTPATIENT)
Dept: CARDIOLOGY CLINIC | Age: 45
End: 2020-12-28

## 2020-12-28 ENCOUNTER — OFFICE VISIT (OUTPATIENT)
Dept: CARDIOLOGY CLINIC | Age: 45
End: 2020-12-28
Payer: MEDICAID

## 2020-12-28 VITALS
HEIGHT: 65 IN | SYSTOLIC BLOOD PRESSURE: 130 MMHG | WEIGHT: 293 LBS | DIASTOLIC BLOOD PRESSURE: 90 MMHG | OXYGEN SATURATION: 98 % | HEART RATE: 88 BPM | BODY MASS INDEX: 48.82 KG/M2 | RESPIRATION RATE: 18 BRPM

## 2020-12-28 DIAGNOSIS — R07.89 CHEST DISCOMFORT: ICD-10-CM

## 2020-12-28 DIAGNOSIS — G47.33 OBSTRUCTIVE SLEEP APNEA: ICD-10-CM

## 2020-12-28 DIAGNOSIS — E66.01 MORBID OBESITY (HCC): ICD-10-CM

## 2020-12-28 DIAGNOSIS — R06.02 SOB (SHORTNESS OF BREATH): ICD-10-CM

## 2020-12-28 DIAGNOSIS — R00.2 PALPITATIONS: ICD-10-CM

## 2020-12-28 DIAGNOSIS — I10 ESSENTIAL HYPERTENSION: Primary | ICD-10-CM

## 2020-12-28 PROCEDURE — 99204 OFFICE O/P NEW MOD 45 MIN: CPT | Performed by: SPECIALIST

## 2020-12-28 NOTE — PROGRESS NOTES
HISTORY OF PRESENT ILLNESS  Roni Ramires is a 54 y.o. female     SUMMARY:   Problem List  Date Reviewed: 12/28/2020          Codes Class Noted    Steatosis of liver ICD-10-CM: K76.0  ICD-9-CM: 571.8  11/10/2020        Snoring ICD-10-CM: R06.83  ICD-9-CM: 786.09  11/5/2020        Gastroesophageal reflux disease without esophagitis ICD-10-CM: K21.9  ICD-9-CM: 530.81  11/5/2020        CRP elevated ICD-10-CM: R79.82  ICD-9-CM: 790.95  3/28/2017        Morbid obesity ([address] ICD-10-CM: E66.01  ICD-9-CM: 278.01  6/3/2016        OCD (obsessive compulsive disorder) ICD-10-CM: F42.9  ICD-9-CM: 300.3  6/3/2016        Anxiety ICD-10-CM: F41.9  ICD-9-CM: 300.00  6/3/2016        ADD (attention deficit disorder) ICD-10-CM: F98.8  ICD-9-CM: 314.00  6/3/2016        HTN (hypertension) ICD-10-CM: I10  ICD-9-CM: 401.9  5/28/2014        Obstructive sleep apnea ICD-10-CM: G47.33  ICD-9-CM: 327.23  5/28/2014        Hip pain, right ICD-10-CM: M25.551  ICD-9-CM: 719.45  5/28/2014        Hypothyroidism ICD-10-CM: E03.9  ICD-9-CM: 244.9  Unknown        [facility] ICD-10-CM: E65  ICD-9-CM: 278.1  6/22/2010              Current Outpatient Medications on File Prior to Visit   Medication Sig    hydrOXYzine HCL (ATARAX) 10 mg tablet Take 2 po hs for sleep prn and 1 po 8 hr apart prn for anxiety    DULoxetine (CYMBALTA) 20 mg capsule Take 1 Cap by mouth two (2) times a day. INDICATION: ANXIETY AND DEPRESSION    spironolactone-hydrochlorothiazide (ALDACTAZIDE) 25-25 mg per tablet Take 1 Tab by mouth daily.  levothyroxine (SYNTHROID) 175 mcg tablet Take 1 Tab by mouth Daily (before breakfast).  atropine-PHENobarbital-scopolamine-hyoscyamine (DONNATAL) 16.2-0.1037 -0.0194 mg per tablet Take 1 Tab by mouth every six (6) hours as needed for Pain.  famotidine (PEPCID) 20 mg tablet Take 1 Tab by mouth two (2) times a day. No current facility-administered medications on file prior to visit.         CARDIOLOGY STUDIES TO DATE:  No specialty comments available. Chief Complaint   Patient presents with    New Patient     HPI :  She is referred by her primary care for cardiac evaluation. She is morbidly obese and is taking steps now to hopefully undergo bariatric surgery and just started a walking program which is going okay. She had Covid in July and ever since then she has had lots of trouble with palpitations shortness of breath chest pain with and without activity and fatigue. She has chronic muscle aches and pains and was recently treated for gout. Recent blood work was remarkable for normal glucose and lipid profile with an elevated triglycerides. She has hypertension but no history of diabetes. She has never smoked and family history is negative for premature coronary disease. She has sleep apnea. Her primary care recently performed an EKG on her which showed sinus rhythm and minor nonspecific ST-T wave changes. CARDIAC ROS:   negative for syncope, orthopnea, paroxysmal nocturnal dyspnea, exertional chest pressure/discomfort, claudication, lower extremity edema    Family History   Problem Relation Age of Onset    Hypertension Mother     Hypertension Father    Juan Manuel Hobson Stroke Father         age 79    Thyroid Disease Sister         hypothyroidism   Juan Manuel Hobson Arthritis-rheumatoid Sister        Past Medical History:   Diagnosis Date    ADD (attention deficit disorder)     Anxiety     Carpal tunnel syndrome     left    DJD (degenerative joint disease)     Endocrine disease     hypothyroidism    H. PYLORI INFECTION     treated with triple therapy in Fall 2008    Hypertension     Sleep apnea     Unspecified hypothyroidism        GENERAL ROS:  A comprehensive review of systems was negative except for that written in the HPI.     Visit Vitals  BP (!) 130/90 (BP 1 Location: Left arm, BP Patient Position: Sitting)   Pulse 88   Resp 18   Ht 5' 5\" (1.651 m)   Wt 293 lb (132.9 kg)   SpO2 98%   BMI 48.76 kg/m²       Wt Readings from Last 3 Encounters:   12/28/20 293 lb (132.9 kg)   11/24/20 288 lb (130.6 kg)   11/10/20 290 lb 11.2 oz (131.9 kg)            BP Readings from Last 3 Encounters:   12/28/20 (!) 130/90   11/24/20 136/89   11/10/20 123/85       PHYSICAL EXAM  General appearance: alert, cooperative, no distress, appears stated age  Neurologic: Alert and oriented X 3  Neck: supple, symmetrical, trachea midline, no adenopathy, no carotid bruit and no JVD  Lungs: clear to auscultation bilaterally  Heart: regular rate and rhythm, S1, S2 normal, no murmur, click, rub or gallop  Abdomen: morbidly obese  Extremities: extremities normal, atraumatic, no cyanosis or edema  Pulses: 2+ and symmetric    Lab Results   Component Value Date/Time    Cholesterol, total 159 11/10/2020 04:38 PM    Cholesterol, total 149 11/12/2019 02:10 PM    Cholesterol, total 182 09/06/2016 11:37 AM    HDL Cholesterol 30 (L) 11/10/2020 04:38 PM    HDL Cholesterol 40 11/12/2019 02:10 PM    HDL Cholesterol 41 09/06/2016 11:37 AM    LDL, calculated 85 11/10/2020 04:38 PM    LDL, calculated 88.8 11/12/2019 02:10 PM    LDL, calculated 104 (H) 09/06/2016 11:37 AM    Triglyceride 267 (H) 11/10/2020 04:38 PM    Triglyceride 101 11/12/2019 02:10 PM    Triglyceride 183 (H) 09/06/2016 11:37 AM    CHOL/HDL Ratio 3.7 11/12/2019 02:10 PM     ASSESSMENT :      She has minimal cardiac risk factors, but given her recent symptoms in light of Covid infection I do think it is reasonable to get an echocardiogram on her as well as a 30-day event monitor. I do not think she needs any stress testing or other imaging at this point. current treatment plan is effective, no change in therapy  lab results and schedule of future lab studies reviewed with patient  reviewed diet, exercise and weight control    Encounter Diagnoses   Name Primary?     Essential hypertension Yes    Obstructive sleep apnea     Morbid obesity ([facility] [address]      No orders of the defined types were placed in this encounter. Follow-up and Dispositions    · Return in about 6 months (around 6/28/2021). Joshua Weldon MD  12/28/2020  Please note that this dictation was completed with [employer], the computer voice recognition software. Quite often unanticipated grammatical, syntax, homophones, and other interpretive errors are inadvertently transcribed by the computer software. Please disregard these errors. Please excuse any errors that have escaped final proofreading. Thank you.

## 2021-01-07 ENCOUNTER — CLINICAL SUPPORT (OUTPATIENT)
Dept: SURGERY | Age: 46
End: 2021-01-07

## 2021-01-07 DIAGNOSIS — E66.01 MORBID OBESITY (HCC): Primary | ICD-10-CM

## 2021-01-11 NOTE — PROGRESS NOTES
[facility] at [facility]  Supervised Weight Loss     Date:   2021    Patient's Name: Anand Gallego  : 1975    Insurance:  [employer]          Session: 3   6  Surgery: Gastric Bypass                  Surgeon:  Matthew Deluca M.D.      Height: 65\"                 Reported Weight:    293      Lbs. BMI: 48             Pounds Lost since last month: 0#               Pounds Gained since last month: 6     Starting Weight: 293#                       Previous Months Weight: 287#  Overall Pounds Lost: 0#                  Overall Pounds Gained: 0     Other Pertinent Information: Today's appointment was completed in a virtual setting d/t COVID-19. Today's wt was self-reported. Smoking Status:  none  Alcohol Intake: none    I have reviewed with pt the guidelines of the supervised wt loss program.  Pt understands the expectations of some wt loss during the program and that wt gain could delay the process. I have also explained that appointments need to be consecutive and missing an appointment may result in starting over. Pt has received this information in writing. Changes that patient has made since last month include:   No soda, no snacks, no added sugars. Eating Habits and Behaviors  A nutrition lesson specific to vitamins was provided. We discussed the various reasons for needing vitamins and different types and doses. General healthy eating guidelines were also discussed. Pts were instructed that their plate should be made up 1/2 plate coming from non-starchy vegetables, 1/4 coming from lean meat, and 1/4 of their plate coming from carbohydrates, including fruits, starches, or milk. We discussed measuring meals to 1/2 cup total per meal after surgery. Drinking only calorie-free, sugar-free and non-carbonated beverages.  We discussed the importance of drinking 64 ounces of fluid per day to prevent dehydration post-operatively. Patient's current diet habits include: Pt is eating 3 meals per day. Snack choices include none. Pt is eating refined carbohydrate foods (bread, pasta, rice, potatoes) 2 times per week. Pt is eating sweets/desserts none. Pt is using baked, grilled, broiled cooking methods. Pt is eating meals prepared outside of the home 1-3 times per week. Pt is drinking water, coffee and Crystal [employer]. Pt reports yes to emotional eating. Physical Activity/Exercise  We talked about the importance of increasing daily physical activity and beginning to develop an exercise regimen/routine. We talked about exercise as being an important part of long term weight loss after surgery. Comments:  During class, I discussed with patient the importance of getting into an exercise routine. Pt is currently walking for activity. Pt has been encouraged to maintain and increase as tolerated. Behavior Modification       We talked about how to eat more mindfully and identify emotional eating triggers. Tips and recommendations for how to make these changes were provided. Pt was encouraged to keep a food journal and record what they were taking in daily. Overall Assessment: Pt demonstrates appropriate lifestyle changes evidenced by reported changes and weight maintenance. Will continue to assess. Patient-Set Goals:   1. Nutrition - healthy well balanced diet   2. Exercise - walking daily   3.  Behavior -eating 3 times per day    Kristie Young RD  1/11/2021

## 2021-01-18 ENCOUNTER — HOSPITAL ENCOUNTER (OUTPATIENT)
Dept: SLEEP MEDICINE | Age: 46
Discharge: HOME | End: 2021-01-18
Payer: MEDICAID

## 2021-01-18 ENCOUNTER — OFFICE VISIT (OUTPATIENT)
Dept: SLEEP MEDICINE | Age: 46
End: 2021-01-18

## 2021-01-18 DIAGNOSIS — G47.33 OSA (OBSTRUCTIVE SLEEP APNEA): Primary | ICD-10-CM

## 2021-01-18 PROCEDURE — 95806 SLEEP STUDY UNATT&RESP EFFT: CPT | Performed by: SPECIALIST

## 2021-01-18 NOTE — PROGRESS NOTES
[address]  [place] [address]  Tel.  [phone]  Fax. [phone] [address]  [place] [address]  Tel.  [phone]  Fax. [phone] [address] [place] [address]  Tel.  [phone]  Fax. [phone]       S>Shauna Soares is a 45 y.o. female seen today to receive a home sleep testing unit (HST).    · Patient was educated on proper hookup and operation of the HST.  · Instruction forms and documentation were reviewed and signed.  · The patient demonstrated good understanding of the HST.    O>    There were no vitals taken for this visit.      A>  1. LONDON (obstructive sleep apnea)          P>  · General information regarding operations and maintenance of the device was provided.  · She was provided information on sleep apnea including coresponding risk factors and the importance of proper treatment.   · Follow-up appointment was made to return the HST. She will be contacted once the results have been reviewed.  · She was asked to contact our office for any problems regarding her home sleep test study.

## 2021-01-22 ENCOUNTER — ANCILLARY PROCEDURE (OUTPATIENT)
Dept: CARDIOLOGY CLINIC | Age: 46
End: 2021-01-22
Payer: MEDICAID

## 2021-01-22 ENCOUNTER — TELEPHONE (OUTPATIENT)
Dept: CARDIOLOGY CLINIC | Age: 46
End: 2021-01-22

## 2021-01-22 VITALS — WEIGHT: 293 LBS | HEIGHT: 65 IN | BODY MASS INDEX: 48.82 KG/M2

## 2021-01-22 DIAGNOSIS — R06.02 SOB (SHORTNESS OF BREATH): ICD-10-CM

## 2021-01-22 DIAGNOSIS — R00.2 PALPITATIONS: ICD-10-CM

## 2021-01-22 DIAGNOSIS — R07.89 CHEST DISCOMFORT: ICD-10-CM

## 2021-01-22 LAB
ECHO AO ASC DIAM: 3.15 CM
ECHO AO ROOT DIAM: 2.85 CM
ECHO AV AREA PEAK VELOCITY: 2.66 CM2
ECHO AV AREA VTI: 2.33 CM2
ECHO AV AREA/BSA PEAK VELOCITY: 1.1 CM2/M2
ECHO AV AREA/BSA VTI: 1 CM2/M2
ECHO AV MEAN GRADIENT: 2.88 MMHG
ECHO AV PEAK GRADIENT: 4.65 MMHG
ECHO AV PEAK VELOCITY: 107.75 CM/S
ECHO AV VTI: 22.14 CM
ECHO LA AREA 4C: 10.26 CM2
ECHO LA MAJOR AXIS: 3.88 CM
ECHO LA MINOR AXIS: 1.67 CM
ECHO LA VOL 2C: 36.74 ML (ref 22–52)
ECHO LA VOL 4C: 19.97 ML (ref 22–52)
ECHO LA VOL BP: 28.51 ML (ref 22–52)
ECHO LA VOL/BSA BIPLANE: 12.25 ML/M2 (ref 16–28)
ECHO LA VOLUME INDEX A2C: 15.79 ML/M2 (ref 16–28)
ECHO LA VOLUME INDEX A4C: 8.58 ML/M2 (ref 16–28)
ECHO LV E' LATERAL VELOCITY: 10.61 CM/S
ECHO LV E' SEPTAL VELOCITY: 9.94 CM/S
ECHO LV INTERNAL DIMENSION DIASTOLIC: 4.52 CM (ref 3.9–5.3)
ECHO LV INTERNAL DIMENSION SYSTOLIC: 3.2 CM
ECHO LV IVSD: 1.03 CM (ref 0.6–0.9)
ECHO LV MASS 2D: 142.9 G (ref 67–162)
ECHO LV MASS INDEX 2D: 61.4 G/M2 (ref 43–95)
ECHO LV POSTERIOR WALL DIASTOLIC: 0.86 CM (ref 0.6–0.9)
ECHO LVOT DIAM: 2 CM
ECHO LVOT PEAK GRADIENT: 3.31 MMHG
ECHO LVOT PEAK VELOCITY: 90.83 CM/S
ECHO LVOT SV: 51.6 ML
ECHO LVOT VTI: 16.35 CM
ECHO MV A VELOCITY: 59.07 CM/S
ECHO MV AREA PHT: 4.24 CM2
ECHO MV E DECELERATION TIME (DT): 179.01 MS
ECHO MV E VELOCITY: 49.21 CM/S
ECHO MV E/A RATIO: 0.83
ECHO MV E/E' LATERAL: 4.64
ECHO MV E/E' RATIO (AVERAGED): 4.79
ECHO MV E/E' SEPTAL: 4.95
ECHO MV PRESSURE HALF TIME (PHT): 51.91 MS
ECHO RA AREA 4C: 11.71 CM2
ECHO RA MAJOR AXIS: 2.68 CM
ECHO RV INTERNAL DIMENSION: 2.27 CM
LA VOL DISK BP: 27.28 ML (ref 22–52)
LVOT MG: 2.07 MMHG

## 2021-01-22 PROCEDURE — 93306 TTE W/DOPPLER COMPLETE: CPT | Performed by: SPECIALIST

## 2021-01-22 NOTE — TELEPHONE ENCOUNTER
----- Message from Radha Joseph MD sent at 1/22/2021 12:57 PM EST -----  Heart muscle is strong and valves all ok. Looks [facility] sent to patient.

## 2021-01-28 ENCOUNTER — DOCUMENTATION ONLY (OUTPATIENT)
Dept: CARDIOLOGY CLINIC | Age: 46
End: 2021-01-28

## 2021-01-30 ENCOUNTER — TELEPHONE (OUTPATIENT)
Dept: SLEEP MEDICINE | Age: 46
End: 2021-01-30

## 2021-01-30 NOTE — TELEPHONE ENCOUNTER
HSAT demonstrated sleep disordered breathing characterized by an overall AHI of 8.2/h associated with minimal SaO2 of 83%. Events were primarily supine with a supine-related AHI of 17.2/h. Snoring during 4.9% of the study. Recommendation: Oral appliance may be preferable treatment for the primarily supine related events. Other options include APAP 5 to 15 cm. Sleep technologist: Please advise patient of HSAT results. Appropriate therapy per patient preference will be initiated.

## 2021-02-17 ENCOUNTER — CLINICAL SUPPORT (OUTPATIENT)
Dept: SURGERY | Age: 46
End: 2021-02-17

## 2021-02-17 DIAGNOSIS — E66.01 MORBID OBESITY (HCC): Primary | ICD-10-CM

## 2021-02-19 NOTE — PROGRESS NOTES
[facility]  Supervised Weight Loss     Date:   2021    Patient's Name: Jose Miguel Kent  : 1975    Insurance:  [employer]          Session: 6 [place]  6  Surgery: Gastric Bypass                  Surgeon: Eneida Camargo M.D.      Height: 65\"                 HIIONUGW Weight:    288      Lbs.                               BMI: 28             Pounds Lost since last month: 5#               Pounds Gained since last month: 0     Starting Weight: 293#                       Previous Months Weight: 293#  Overall Pounds Lost: 5#                  Overall Pounds Gained: 0     Other Pertinent Information: Today's appointment was completed in a virtual setting d/t COVID[employer] Today's wt was self-reported. Smoking Status:  none  Alcohol Intake: none    I have reviewed with pt the guidelines of the supervised wt loss program.  Pt understands the expectations of some wt loss during the program and that wt gain could delay the process. I have also explained that appointments need to be consecutive and missing an appointment may result in starting over. Pt has received this information in writing. Changes that patient has made since last month include:  More exercise, more water, less sugars/carbs. Eating Habits and Behaviors  General healthy eating guidelines were also discussed. Pts were instructed that their plate should be made up 1/2 plate coming from non-starchy vegetables, 1/4 coming from lean meat, and 1/4 of their plate coming from carbohydrates, including fruits, starches, or milk. We discussed measuring meals to 1/2 cup total per meal after surgery. Drinking only calorie-free, sugar-free and non-carbonated beverages. We discussed the importance of drinking 64 ounces of fluid per day to prevent dehydration post-operatively.                       Physical Activity/Exercise  We talked about the importance of increasing daily physical activity and beginning to develop an exercise regimen/routine. We talked about exercise as being an important part of long term weight loss after surgery. Comments:  During class, I discussed with patient the importance of getting into an exercise routine. Behavior Modification       A behavior modification lesson was provided with an emphasis on developing mindful eating behaviors. We talked about how to eat more mindfully and identify emotional eating triggers. Tips and recommendations for how to make these changes were provided. Pt was encouraged to keep a food journal and record what they were taking in daily. Patient's reported eating behaviors: Pt reports yes to emotional eating and reports working with a therapist to develop non-food coping skills. We discussed additional strategies and tips for implementing behavior modification and mindful eating behaviors. Overall Assessment: Pt demonstrates appropriate lifestyle changes evidenced by reported changes and reported wt loss. Will continue to assess. Patient-Set Goals:   1. Nutrition - eat healthier   2. Exercise - increase walking  3.  Behavior -stop eating just because it's convenient     Agapito Calderon, FERNANDO  2/19/2021

## 2021-02-23 ENCOUNTER — TELEPHONE (OUTPATIENT)
Dept: CARDIOLOGY CLINIC | Age: 46
End: 2021-02-23

## 2021-02-23 NOTE — TELEPHONE ENCOUNTER
----- Message from Lorie Lee MD sent at 2/11/2021 10:23 AM EST -----  No abnormalities.  Looks great

## 2021-03-08 ENCOUNTER — TELEPHONE (OUTPATIENT)
Dept: SLEEP MEDICINE | Age: 46
End: 2021-03-08

## 2021-03-08 DIAGNOSIS — G47.33 OSA (OBSTRUCTIVE SLEEP APNEA): Primary | ICD-10-CM

## 2021-03-10 ENCOUNTER — HOSPITAL ENCOUNTER (OUTPATIENT)
Dept: MAMMOGRAPHY | Age: 46
Discharge: HOME | End: 2021-03-10
Payer: MEDICAID

## 2021-03-10 DIAGNOSIS — Z12.31 ENCOUNTER FOR SCREENING MAMMOGRAM FOR MALIGNANT NEOPLASM OF BREAST: ICD-10-CM

## 2021-03-10 PROCEDURE — 77067 SCR MAMMO BI INCL CAD: CPT

## 2021-03-11 ENCOUNTER — DOCUMENTATION ONLY (OUTPATIENT)
Dept: SLEEP MEDICINE | Age: 46
End: 2021-03-11

## 2021-03-18 ENCOUNTER — CLINICAL SUPPORT (OUTPATIENT)
Dept: SURGERY | Age: 46
End: 2021-03-18

## 2021-03-18 DIAGNOSIS — E66.01 MORBID OBESITY (HCC): Primary | ICD-10-CM

## 2021-03-19 NOTE — PROGRESS NOTES
[facility] at [facility]  Supervised Weight Loss     Date:   3/18/2021    Patient's Name: Olman Cage  : 1975    Insurance:  [employer]          Session:   Surgery: Gastric Bypass                  Surgeon: Hank Richardson M.D.      Height: 65\"                 RYSUWPHT Weight:    290      Lbs.                               BMI: 00             Pounds Lost since last month: 0#               Pounds Gained since last month: 2     Starting Weight: 293#                       Previous Months Weight: 288#  Overall Pounds Lost: 3#                  Overall Pounds Gained: 0     Other Pertinent Information: Today's appointment was completed in a virtual setting d/t COVID-[employer]. Today's wt was self-reported. Smoking Status:  none  Alcohol Intake: none    I have reviewed with pt the guidelines of the supervised wt loss program.  Pt understands the expectations of some wt loss during the program and that wt gain could delay the process. I have also explained that appointments need to be consecutive and missing an appointment may result in starting over. Pt has received this information in writing. Changes that patient has made since last month include:  More exercise, eating 3 meals a day, no sugar, fried foods or carbs. Eating Habits and Behaviors  General healthy eating guidelines were discussed. A nutrition lesson was presented on portion control. Patients were instructed implement portion control now using the balanced plate method (1/2 plate non-starchy vegetables, 1/4 plate lean meat, and 1/4 plate whole grains and to include fruit and/or milk at meals or snack). We discussed measuring meals to 1/2 cup total per meal after surgery and appropriate portion progression long term. Patient's current diet habits include: Pt is eating 2-3 meals per day. Snack choices include protein shake.  Pt is eating refined carbohydrate foods (bread, pasta, rice, potatoes) 1-2 times per week. Pt is eating sweets/desserts none. Pt is using baked, grilled, broiled cooking methods. Pt is eating meals prepared outside of the home 1-2 times per week. Pt is drinking water, coffee, Crystal [employer]. Pt reports no to emotional eating. Physical Activity/Exercise  We talked about the importance of increasing daily physical activity and beginning to develop an exercise regimen/routine. We talked about exercise as being an important part of long term weight loss after surgery. Comments:  During class, I discussed with patient the importance of getting into an exercise routine. Pt is currently walking for activity. Pt has been encouraged to maintain and increase as tolerated. Behavior Modification       We talked about how to eat more mindfully. Tips and recommendations for how to make these changes were provided. Pt was encouraged to keep a food journal and record what they were taking in daily. Overall Assessment: Pt demonstrates appropriate lifestyle changes evidenced by reported changes and reported overall wt loss. Will continue to assess. Patient-Set Goals:   1. Nutrition - eating enough protein   2. Exercise - increase walking  3.  Behavior -pay more attention to why I eat     Manezekiel Novak, RD  3/19/2021

## 2021-04-12 ENCOUNTER — OFFICE VISIT (OUTPATIENT)
Dept: INTERNAL MEDICINE CLINIC | Age: 46
End: 2021-04-12
Payer: MEDICAID

## 2021-04-12 VITALS
HEIGHT: 65 IN | SYSTOLIC BLOOD PRESSURE: 130 MMHG | TEMPERATURE: 98.5 F | OXYGEN SATURATION: 95 % | DIASTOLIC BLOOD PRESSURE: 90 MMHG | HEART RATE: 84 BPM | BODY MASS INDEX: 48.65 KG/M2 | WEIGHT: 292 LBS | RESPIRATION RATE: 16 BRPM

## 2021-04-12 DIAGNOSIS — G47.33 OBSTRUCTIVE SLEEP APNEA SYNDROME: ICD-10-CM

## 2021-04-12 DIAGNOSIS — M10.9 GOUT, UNSPECIFIED CAUSE, UNSPECIFIED CHRONICITY, UNSPECIFIED SITE: ICD-10-CM

## 2021-04-12 DIAGNOSIS — Z76.0 MEDICATION REFILL: ICD-10-CM

## 2021-04-12 DIAGNOSIS — I10 ESSENTIAL HYPERTENSION, BENIGN: ICD-10-CM

## 2021-04-12 DIAGNOSIS — E78.5 HYPERLIPIDEMIA, UNSPECIFIED HYPERLIPIDEMIA TYPE: ICD-10-CM

## 2021-04-12 DIAGNOSIS — E03.9 HYPOTHYROIDISM, UNSPECIFIED TYPE: ICD-10-CM

## 2021-04-12 DIAGNOSIS — E03.9 ACQUIRED HYPOTHYROIDISM: Primary | ICD-10-CM

## 2021-04-12 DIAGNOSIS — Z79.899 ENCOUNTER FOR LONG-TERM (CURRENT) USE OF OTHER MEDICATIONS: ICD-10-CM

## 2021-04-12 PROCEDURE — 99214 OFFICE O/P EST MOD 30 MIN: CPT | Performed by: FAMILY MEDICINE

## 2021-04-12 RX ORDER — LEVOTHYROXINE SODIUM 175 UG/1
175 TABLET ORAL
Qty: 90 TAB | Refills: 1 | Status: SHIPPED | OUTPATIENT
Start: 2021-04-12 | End: 2021-06-24 | Stop reason: ALTCHOICE

## 2021-04-12 RX ORDER — ALLOPURINOL 100 MG/1
100 TABLET ORAL DAILY
Qty: 30 TAB | Refills: 5 | Status: SHIPPED | OUTPATIENT
Start: 2021-04-12 | End: 2021-06-21

## 2021-04-12 NOTE — PROGRESS NOTES
[facility]  Lisa Gordillo. MD Josy  [address] [address]    Chief Complaint   Patient presents with    Medication Refill    Thyroid Problem       SUBJECTIVE:    Feng Jack is a 54 y.o. female who presents for follow up of hypertension and hyperlipidemia. She has a history of hypothyroidism obstructive sleep apnea acid reflux hepatic steatosis elevated triglyceride level elevated CRP chronic arthralgias, obesity and attention deficit disorder. Preop, gastric bypass surgery. Patient reports that she feels better overall. Her arthralgias have resolved after initiating Zyloprim for elevated uric acid. Diet and Lifestyle: generally follows a low fat low cholesterol diet, not attempting to follow a low sodium diet, sedentary, nonsmoker  Home BP Monitoring: is not measured at home    Cardiovascular ROS: taking medications as instructed, no medication side effects noted, no TIA's, no chest pain on exertion, no dyspnea on exertion, no swelling of ankles. THYROID MED REFILL needed    DYSPHAGIA, BARIUM SWALLOW ORDERED  COUGHS AFTER EATING    INSOMNIA BETTER  CPAP compliant  NO MEDS REQUIRED    OFF DULOXETINE  CPAP \"CURED\" ANXIETY    OFF FAMOTIDIDNE for GERD  DIET CONTROLLED    Current Outpatient Medications   Medication Sig Dispense Refill    levothyroxine (SYNTHROID) 175 mcg tablet Take 1 Tab by mouth Daily (before breakfast). 90 Tab 1    allopurinoL (ZYLOPRIM) 100 mg tablet Take 1 Tab by mouth daily. TAKE TO PREVENT GOUT 30 Tab 5    spironolactone-hydrochlorothiazide (ALDACTAZIDE) 25-25 mg per tablet Take 1 Tab by mouth daily. 30 Tab 11    hydrOXYzine HCL (ATARAX) 10 mg tablet Take 2 po hs for sleep prn and 1 po 8 hr apart prn for anxiety 60 Tab 0    DULoxetine (CYMBALTA) 20 mg capsule Take 1 Cap by mouth two (2) times a day. INDICATION: ANXIETY AND DEPRESSION 60 Cap 2    famotidine (PEPCID) 20 mg tablet Take 1 Tab by mouth two (2) times a day.  20 Tab 0     Past Medical History:   Diagnosis Date    ADD (attention deficit disorder)     Anxiety     Carpal tunnel syndrome     left    DJD (degenerative joint disease)     Endocrine disease     hypothyroidism    H. PYLORI INFECTION     treated with triple therapy in Fall 2008    Hypertension     Sleep apnea     Unspecified hypothyroidism      Past Surgical History:   Procedure Laterality Date    HX GYN      Bartholin cyst removal    HX TUBAL LIGATION      WY EXCIS BARTHOLIN GLAND/CYST       Allergies   Allergen Reactions    Darvocet A500 [Propoxyphene N-Acetaminophen] Hives    Sertraline Other (comments)     Intolerance       Social History     Socioeconomic History    Marital status: SINGLE     Spouse name: Not on file    Number of children: 11    Years of education: Not on file    Highest education level: Not on file   Occupational History    Occupation: [employer] -    Tobacco Use    Smoking status: Never Smoker    Smokeless tobacco: Never Used   Substance and Sexual Activity    Alcohol use: No    Drug use: No    Sexual activity: Never   Other Topics Concern    Exercise Yes     Comment: Walking 4 miles/day to/from work. Social History Narrative    Living with 23 yo daughter and 2 grandchildren in [facility] [place] [facility]. No pets in the house. 21, 25, 15, 5 yo children - 2 youngest children live in [place] with pt's mother.       Family History   Problem Relation Age of Onset    Hypertension Mother     Hypertension Father     Stroke Father         age 79    Thyroid Disease Sister         hypothyroidism    Arthritis-rheumatoid Sister        OBJECTIVE:     Visit Vitals  BP (!) 130/90 (BP 1 Location: Left upper arm, BP Patient Position: Sitting, BP Cuff Size: Large adult)   Pulse 84   Temp 98.5 °F (36.9 °C) (Oral)   Resp 16   Ht 5' 5\" (1.651 m)   Wt 292 lb (132.5 kg)   LMP 03/26/2021 (LMP Unknown)   SpO2 95%   BMI 48.59 kg/m²     Appearance: alert, well appearing, and in no distress. General exam: CVS exam BP noted to be well controlled today in office, S1, S2 normal, no gallop, no murmur, chest clear, no JVD, no HSM, no edema,  peripheral vascular exam radial pulses normal, pedal pulses normal both DP's and PT's, neurological exam alert, oriented, normal speech, no focal findings or movement disorder noted. Office Visit on 04/12/2021   Component Date Value Ref Range Status    LIPID PROFILE 04/12/2021        Final    Cholesterol, total 04/12/2021 159  <200 MG/DL Final    Triglyceride 04/12/2021 190* <150 MG/DL Final    Comment: Based on NCEP-ATP III:  Triglycerides <150 mg/dL  is considered normal, 150-199  mg/dL  borderline high,  200-499 mg/dL high and  greater than or equal to 500  mg/dL very high.  HDL Cholesterol 04/12/2021 34  MG/DL Final    Comment: Based on NCEP ATP III, HDL Cholesterol <40 mg/dL is considered low and >60  mg/dL is elevated.       LDL, calculated 04/12/2021 87  0 - 100 MG/DL Final    Comment: Based on the NCEP-ATP: LDL-C concentrations are considered  optimal <100 mg/dL,  near optimal/above Normal 100-129 mg/dL Borderline High: 130-159, High: 160-189  mg/dL Very High: Greater than or equal to 190 mg/dL      VLDL, calculated 04/12/2021 38  MG/DL Final    CHOL/HDL Ratio 04/12/2021 4.7  0.0 - 5.0   Final    Color 04/12/2021 YELLOW/STRAW    Final    Color Reference Range: Straw, Yellow or Dark Yellow    Appearance 04/12/2021 CLEAR  CLEAR   Final    Specific gravity 04/12/2021 1.020  1.003 - 1.030   Final    pH (UA) 04/12/2021 6.5  5.0 - 8.0   Final    Protein 04/12/2021 Negative  Negative mg/dL Final    Glucose 04/12/2021 Negative  Negative mg/dL Final    Ketone 04/12/2021 Negative  Negative mg/dL Final    Bilirubin 04/12/2021 Negative  Negative   Final    Blood 04/12/2021 Negative  Negative   Final    Urobilinogen 04/12/2021 0.2  0.2 - 1.0 EU/dL Final    Nitrites 04/12/2021 Negative  Negative   Final    Leukocyte Esterase 04/12/2021 Negative  Negative   Final    UA:UC IF INDICATED 04/12/2021 CULTURE NOT INDICATED BY UA RESULT    Final    WBC 04/12/2021 0-4  0 - 4 /hpf Final    RBC 04/12/2021 0-5  0 - 5 /hpf Final    Epithelial cells 04/12/2021 FEW  FEW /lpf Final    Comment: Epithelial cell category consists of squamous cells and /or transitional  urothelial cells. Renal tubular cells, if present, are separately identified as  such.  Bacteria 04/12/2021 1+* Negative /hpf Final    Hyaline cast 04/12/2021 0-2  0 - 5 /lpf Final    Sodium 04/12/2021 140  136 - 145 mmol/L Final    Potassium 04/12/2021 3.9  3.5 - 5.1 mmol/L Final    Chloride 04/12/2021 102  97 - 108 mmol/L Final    CO2 04/12/2021 31  21 - 32 mmol/L Final    Anion gap 04/12/2021 7  5 - 15 mmol/L Final    Glucose 04/12/2021 93  65 - 100 mg/dL Final    BUN 04/12/2021 12  6 - 20 MG/DL Final    Creatinine 04/12/2021 0.96  0.55 - 1.02 MG/DL Final    BUN/Creatinine ratio 04/12/2021 13  12 - 20   Final    GFR est AA 04/12/2021 >60  >60 ml/min/1.73m2 Final    GFR est non-AA 04/12/2021 >60  >60 ml/min/1.73m2 Final    Comment: Estimated GFR is calculated using the IDMS-traceable Modification of Diet in  Renal Disease (MDRD) Study equation, reported for both  Americans  (GFRAA) and non- Americans (GFRNA), and normalized to 1.73m2 body  surface area. The physician must decide which value applies to the patient.  Calcium 04/12/2021 9.3  8.5 - 10.1 MG/DL Final    Uric acid 04/12/2021 6.2* 2.6 - 6.0 MG/DL Final    T4, Free 04/12/2021 1.0  0.8 - 1.5 NG/DL Final    TSH 04/12/2021 21.10* 0.36 - 3.74 uIU/mL Final    Comment:   Due to TSH heterogeneity, both structurally and degree of glycosylation,  monoclonal antibodies used in the TSH assay may not accurately quantitate TSH. Therefore, this result should be correlated with clinical findings as well as  with other assessments of thyroid function, e.g., free T4, free T3.      Ancillary Procedure on 01/22/2021 Component Date Value Ref Range Status    IVSd 01/22/2021 1.03* 0.60 - 0.90 cm Final    LVIDd 01/22/2021 4.52  3.90 - 5.30 cm Final    LVIDs 01/22/2021 3.20  cm Final    LVOT d 01/22/2021 2.00  cm Final    LVPWd 01/22/2021 0.86  0.60 - 0.90 cm Final    LVOT Peak Gradient 01/22/2021 3.31  mmHg Final    Left Ventricular Outflow Tract Nicole* 01/22/2021 2.07  mmHg Final    LVOT SV 01/22/2021 51.6  mL Final    LVOT Peak Velocity 01/22/2021 90.83  cm/s Final    LVOT VTI 01/22/2021 16.35  cm Final    RVIDd 01/22/2021 2.27  cm Final    Left Atrium Major Axis 01/22/2021 3.88  cm Final    LA Volume 01/22/2021 28.51  22.0 - 52.0 mL Final    LA Area 4C 01/22/2021 10.26  cm2 Final    LA Vol 2C 01/22/2021 36.74  22.00 - 52.00 mL Final    LA Vol 4C 01/22/2021 19.97* 22.00 - 52.00 mL Final    LA Volume DISK BP 01/22/2021 27.28  22.0 - 52.0 mL Final    Right Atrium Major Axis 01/22/2021 2.68  cm Final    Right Atrial Area 4C 01/22/2021 11.71  cm2 Final    Aortic Valve Area by Continuity of* 01/22/2021 2.66  cm2 Final    Aortic Valve Area by Continuity of* 01/22/2021 2.33  cm2 Final    AoV PG 01/22/2021 4.65  mmHg Final    Aortic Valve Systolic Mean Gradient 71/69/0091 2.88  mmHg Final    Aortic Valve Systolic Peak Velocity 73/43/8257 107.75  cm/s Final    AoV VTI 01/22/2021 22.14  cm Final    MV A [place] 01/22/2021 59.07  cm/s Final    Mitral Valve E Wave Deceleration T* 01/22/2021 179.01  ms Final    MV E [place] 01/22/2021 49.21  cm/s Final    E/E' ratio (averaged) 01/22/2021 4.79   Final    E/E' lateral 01/22/2021 4.64   Final    E/E' septal 01/22/2021 4.95   Final    LV E' Lateral Velocity 01/22/2021 10.61  cm/s Final    LV E' Septal Velocity 01/22/2021 9.94  cm/s Final    Mitral Valve Pressure Half-time 01/22/2021 51.91  ms Final    MVA (PHT) 01/22/2021 4.24  cm2 Final    AO ASC D 01/22/2021 3.15  cm Final    Ao Root D 01/22/2021 2.85  cm Final    MV E/A 01/22/2021 0.83   Final    LV Mass AL 01/22/2021 142.9  67.0 - 162.0 g Final    LV Mass AL Index 01/22/2021 61.4  43.0 - 95.0 g/m2 Final    Left Atrium Minor Axis 01/22/2021 1.67  cm Final    LA Vol Index 01/22/2021 12.25  16.00 - 28.00 ml/m2 Final    LA Vol Index 01/22/2021 15.79  16.00 - 28.00 ml/m2 Final    LA Vol Index 01/22/2021 8.58  16.00 - 28.00 ml/m2 Final    JORJE/BSA Pk [place] 01/22/2021 1.1  cm2/m2 Final    JORJE/BSA VTI 01/22/2021 1.0  cm2/m2 Final       ASSESSMENT:   1. Acquired hypothyroidism -stable on levothyroxine   2. Essential hypertension, benign -stable on current regimen   3. Gout, unspecified cause, unspecified chronicity, unspecified site -improved on Zyloprim   4. Hyperlipidemia, unspecified hyperlipidemia type -patient attempting to follow diet   5. Medication refill    6. Encounter for long-term (current) use of other medications    7. Obstructive sleep apnea syndrome -stable on CPAP       I have discussed the diagnosis with the patient and the intended plan as seen in the  orders above. The patient understands and agees with the plan. The patient has   received an after visit summary and questions were answered concerning  future plans  Patient labs and/or xrays were reviewed  Past records were reviewed. PLAN:  .  Orders Placed This Encounter    TSH 3RD GENERATION    T4, FREE    URIC ACID    METABOLIC PANEL, BASIC    URINALYSIS W/ REFLEX CULTURE    LIPID PANEL    levothyroxine (SYNTHROID) 175 mcg tablet    allopurinoL (ZYLOPRIM) 100 mg tablet         Counseled regarding diet, exercise and healthy lifestyle          Advised patient to call back or return to office if symptoms worsen/change/persist.  Discussed expected course/resolution/complications of diagnosis in detail with patient. Medication risks/benefits/costs/interactions/alternatives discussed with patient    Carson Berry M.D.     A total of at least 30 min was spent during this evaluation of which half was spent in counseling and care coordination    This note was created using voice recognition software.   Edits have been made but syntax errors might exist.

## 2021-04-12 NOTE — PROGRESS NOTES
Identified pt with two pt identifiers. Reviewed record in preparation for visit and have obtained necessary documentation. All patient medications has been reviewed. Chief Complaint   Patient presents with    Medication Refill    Thyroid Problem     Additional information about chief complaint:    Visit Vitals  BP (!) 130/90 (BP 1 Location: Left upper arm, BP Patient Position: Sitting, BP Cuff Size: Large adult)   Pulse 84   Temp 98.5 °F (36.9 °C) (Oral)   Resp 16   Ht 5' 5\" (1.651 m)   Wt 292 lb (132.5 kg)   SpO2 95%   BMI 48.59 kg/m²       Health Maintenance Due   Topic    Hepatitis C Screening     COVID-19 Vaccine (1)    PAP AKA CERVICAL CYTOLOGY        1. Have you been to the ER, urgent care clinic since your last visit? Hospitalized since your last visit? No      2. Have you seen or consulted any other health care providers outside of the [address] [place] [address] since your last visit? Include any pap smears or colon screening.   No   bdg

## 2021-04-13 LAB
ANION GAP SERPL CALC-SCNC: 7 MMOL/L (ref 5–15)
APPEARANCE UR: CLEAR
BACTERIA URNS QL MICRO: ABNORMAL /HPF
BILIRUB UR QL: NEGATIVE
BUN SERPL-MCNC: 12 MG/DL (ref 6–20)
BUN/CREAT SERPL: 13 (ref 12–20)
CALCIUM SERPL-MCNC: 9.3 MG/DL (ref 8.5–10.1)
CHLORIDE SERPL-SCNC: 102 MMOL/L (ref 97–108)
CHOLEST SERPL-MCNC: 159 MG/DL
CO2 SERPL-SCNC: 31 MMOL/L (ref 21–32)
COLOR UR: ABNORMAL
CREAT SERPL-MCNC: 0.96 MG/DL (ref 0.55–1.02)
EPITH CASTS URNS QL MICRO: ABNORMAL /LPF
GLUCOSE SERPL-MCNC: 93 MG/DL (ref 65–100)
GLUCOSE UR STRIP.AUTO-MCNC: NEGATIVE MG/DL
HDLC SERPL-MCNC: 34 MG/DL
HDLC SERPL: 4.7 {RATIO} (ref 0–5)
HGB UR QL STRIP: NEGATIVE
HYALINE CASTS URNS QL MICRO: ABNORMAL /LPF (ref 0–5)
KETONES UR QL STRIP.AUTO: NEGATIVE MG/DL
LDLC SERPL CALC-MCNC: 87 MG/DL (ref 0–100)
LEUKOCYTE ESTERASE UR QL STRIP.AUTO: NEGATIVE
LIPID PROFILE,FLP: ABNORMAL
NITRITE UR QL STRIP.AUTO: NEGATIVE
PH UR STRIP: 6.5 [PH] (ref 5–8)
POTASSIUM SERPL-SCNC: 3.9 MMOL/L (ref 3.5–5.1)
PROT UR STRIP-MCNC: NEGATIVE MG/DL
RBC #/AREA URNS HPF: ABNORMAL /HPF (ref 0–5)
SODIUM SERPL-SCNC: 140 MMOL/L (ref 136–145)
SP GR UR REFRACTOMETRY: 1.02 (ref 1–1.03)
T4 FREE SERPL-MCNC: 1 NG/DL (ref 0.8–1.5)
TRIGL SERPL-MCNC: 190 MG/DL (ref ?–150)
TSH SERPL DL<=0.05 MIU/L-ACNC: 21.1 UIU/ML (ref 0.36–3.74)
UA: UC IF INDICATED,UAUC: ABNORMAL
URATE SERPL-MCNC: 6.2 MG/DL (ref 2.6–6)
UROBILINOGEN UR QL STRIP.AUTO: 0.2 EU/DL (ref 0.2–1)
VLDLC SERPL CALC-MCNC: 38 MG/DL
WBC URNS QL MICRO: ABNORMAL /HPF (ref 0–4)

## 2021-04-16 DIAGNOSIS — E03.9 ACQUIRED HYPOTHYROIDISM: Primary | ICD-10-CM

## 2021-04-16 PROBLEM — E78.5 HYPERLIPIDEMIA: Status: ACTIVE | Noted: 2021-04-16

## 2021-04-16 PROBLEM — M10.9 GOUT: Status: ACTIVE | Noted: 2021-04-16

## 2021-04-20 ENCOUNTER — CLINICAL SUPPORT (OUTPATIENT)
Dept: SURGERY | Age: 46
End: 2021-04-20

## 2021-04-20 DIAGNOSIS — E66.01 MORBID OBESITY (HCC): Primary | ICD-10-CM

## 2021-04-21 NOTE — PROGRESS NOTES
[facility] at [facility]  Supervised Weight Loss     Date:   2021    Patient's Name: Ko Braswell  : 1975    Insurance:  [employer]          Session:   Surgery: Gastric Bypass                  Surgeon: Carmen Powers M.D.      Height: 65\"                 [id number] Weight:    290      Lbs.                               BMI: 28             Pounds Lost since last month: 0#               Pounds Gained since last month: 0     Starting Weight: 293#                       Previous Months Weight: 290#  Overall Pounds Lost: 3#                  Overall Pounds Gained: 0     Other Pertinent Information: Today's appointment was completed in a virtual setting d/t COVID-19. Today's wt was self-reported. Smoking Status:  none  Alcohol Intake: none    I have reviewed with pt the guidelines of the supervised wt loss program.  Pt understands the expectations of some wt loss during the program and that wt gain could delay the process. I have also explained that appointments need to be consecutive and missing an appointment may result in starting over. Pt has received this information in writing. Changes that patient has made since last month include:  Portion control, exercise, fluid intake. Eating Habits and Behaviors  General healthy eating guidelines were also discussed. Pts were instructed that their plate should be made up 1/2 plate coming from non-starchy vegetables, 1/4 coming from lean meat, and 1/4 of their plate coming from carbohydrates, including fruits, starches, or milk. We discussed measuring meals to 1/2 cup total per meal after surgery. Drinking only calorie-free, sugar-free and non-carbonated beverages. We discussed the importance of drinking 64 ounces of fluid per day to prevent dehydration post-operatively. Patient's current diet habits include: Pt is eating 3 meals per day.  Snack choices include protein shakes, yogurt. Pt is eating refined carbohydrate foods (bread, pasta, rice, potatoes) daily. Pt is eating sweets/desserts none. Pt is using baked and grilled cooking methods. Pt is eating meals prepared outside of the home once a month. Pt is drinking water and coffee. Pt reports sometimes emotional eating. Physical Activity/Exercise  An exercise presentation was provided including information about exercise programs available both before and after surgery. We talked about the importance of increasing daily physical activity and beginning to develop an exercise regimen/routine. We talked about exercise as being an important part of long term weight loss after surgery. Comments:  During class, I discussed with patient the importance of getting into an exercise routine. Pt is currently walking for 30 minutes, 3 times per week for activity. Pt has been encouraged to maintain and increase as tolerated. Behavior Modification       We talked about how to eat more mindfully. Tips and recommendations for how to make these changes were provided. Pt was encouraged to keep a food journal and record what they were taking in daily. Overall Assessment: Pt demonstrates appropriate lifestyle changes evidenced by reported changes and reported overall wt loss. Demonstrates understanding of basic nutrition guidelines for bariatric surgery. Appears to be an appropriate candidate at this time. Patient-Set Goals:   1. Nutrition - cut out sugar intake  2. Exercise - walking 5 times per week  3.  Behavior -eat at set times    Barber Mckinney, FERNANDO  4/21/2021

## 2021-04-23 ENCOUNTER — DOCUMENTATION ONLY (OUTPATIENT)
Dept: SLEEP MEDICINE | Age: 46
End: 2021-04-23

## 2021-04-23 ENCOUNTER — HOSPITAL ENCOUNTER (OUTPATIENT)
Dept: GENERAL RADIOLOGY | Age: 46
Discharge: HOME | End: 2021-04-23
Attending: SURGERY
Payer: MEDICAID

## 2021-04-23 DIAGNOSIS — K21.9 GASTROESOPHAGEAL REFLUX DISEASE WITHOUT ESOPHAGITIS: ICD-10-CM

## 2021-04-23 PROCEDURE — 74246 X-RAY XM UPR GI TRC 2CNTRST: CPT

## 2021-04-23 NOTE — PROGRESS NOTES
Patient rescheduled 1st [facility] appointment as she had issues with her device and had not been using it. She said water got into the machine and monitor fogged up as well. She tried to call [employer] but nobody called her back. I called [employer] and spoke to Eloise. She said there was no note that patient called but she will call patient to find out problem.

## 2021-05-18 ENCOUNTER — OFFICE VISIT (OUTPATIENT)
Dept: SURGERY | Age: 46
End: 2021-05-18
Payer: MEDICAID

## 2021-05-18 VITALS
HEART RATE: 85 BPM | BODY MASS INDEX: 48.82 KG/M2 | HEIGHT: 65 IN | TEMPERATURE: 99.3 F | DIASTOLIC BLOOD PRESSURE: 91 MMHG | RESPIRATION RATE: 20 BRPM | SYSTOLIC BLOOD PRESSURE: 151 MMHG | WEIGHT: 293 LBS | OXYGEN SATURATION: 98 %

## 2021-05-18 DIAGNOSIS — K21.9 GASTROESOPHAGEAL REFLUX DISEASE WITHOUT ESOPHAGITIS: ICD-10-CM

## 2021-05-18 DIAGNOSIS — G47.33 OBSTRUCTIVE SLEEP APNEA: ICD-10-CM

## 2021-05-18 DIAGNOSIS — E66.01 MORBID OBESITY (HCC): Primary | ICD-10-CM

## 2021-05-18 DIAGNOSIS — I10 ESSENTIAL HYPERTENSION: ICD-10-CM

## 2021-05-18 DIAGNOSIS — E78.5 HYPERLIPIDEMIA, UNSPECIFIED HYPERLIPIDEMIA TYPE: ICD-10-CM

## 2021-05-18 PROCEDURE — 99213 OFFICE O/P EST LOW 20 MIN: CPT | Performed by: SURGERY

## 2021-05-18 NOTE — PROGRESS NOTES
1. Have you been to the ER, urgent care clinic since your last visit? Hospitalized since your last visit? No    2. Have you seen or consulted any other health care providers outside of the [address] [place] [address] since your last visit? Include any pap smears or colon screening.  No

## 2021-05-19 NOTE — PROGRESS NOTES
Subjective: The patient is a 54 y.o. obese female seeking approval for laparoscopic bariatric surgery. She continues to adhere to a low calorie, low carbohydrate diet. She is working with a  3-4 days a week. She has lost weight since her last visit. Bariatric comorbidities present are   Past Medical History:   Diagnosis Date    ADD (attention deficit disorder)     Anxiety     Carpal tunnel syndrome     left    DJD (degenerative joint disease)     Endocrine disease     hypothyroidism    H. PYLORI INFECTION     treated with triple therapy in Fall 2008    Hypertension     Sleep apnea     Unspecified hypothyroidism        Patient Active Problem List    Diagnosis Date Noted    Gout 04/16/2021    Hyperlipidemia 04/16/2021    Steatosis of liver 11/10/2020    Snoring 11/05/2020    Gastroesophageal reflux disease without esophagitis 11/05/2020    CRP elevated 03/28/2017    Morbid obesity ([address] 06/03/2016    OCD (obsessive compulsive disorder) 06/03/2016    Anxiety 06/03/2016    ADD (attention deficit disorder) 06/03/2016    HTN (hypertension) 05/28/2014    Obstructive sleep apnea 05/28/2014    Hip pain, right 05/28/2014    Harmony hump 06/22/2010    Hypothyroidism      Past Medical History:   Diagnosis Date    ADD (attention deficit disorder)     Anxiety     Carpal tunnel syndrome     left    DJD (degenerative joint disease)     Endocrine disease     hypothyroidism    H.  PYLORI INFECTION     treated with triple therapy in Fall 2008    Hypertension     Sleep apnea     Unspecified hypothyroidism       Past Surgical History:   Procedure Laterality Date    HX GYN      Bartholin cyst removal    HX TUBAL LIGATION      AL EXCIS BARTHOLIN GLAND/CYST        Social History     Tobacco Use    Smoking status: Never Smoker    Smokeless tobacco: Never Used   Substance Use Topics    Alcohol use: No      Family History   Problem Relation Age of Onset    Hypertension Mother     Hypertension Father     Stroke Father         age 79    Thyroid Disease Sister         hypothyroidism   [employer] Arthritis-rheumatoid Sister       Prior to Admission medications    Medication Sig Start Date End Date Taking? Authorizing Provider   levothyroxine (SYNTHROID) 175 mcg tablet Take 1 Tab by mouth Daily (before breakfast). 4/12/21  Yes Kameron Stevens MD   allopurinoL (ZYLOPRIM) 100 mg tablet Take 1 Tab by mouth daily. TAKE TO PREVENT GOUT 4/12/21  Yes Kameron Stevens MD   spironolactone-hydrochlorothiazide (ALDACTAZIDE) 25-25 mg per tablet Take 1 Tab by mouth daily. 11/10/20  Yes Alyson Nayak MD   famotidine (PEPCID) 20 mg tablet Take 1 Tab by mouth two (2) times a day. 8/19/18  Yes Abigail Quinonez MD   hydrOXYzine HCL (ATARAX) 10 mg tablet Take 2 po hs for sleep prn and 1 po 8 hr apart prn for anxiety 11/29/20   Kameron Stevens MD   DULoxetine (CYMBALTA) 20 mg capsule Take 1 Cap by mouth two (2) times a day. INDICATION: ANXIETY AND DEPRESSION 11/24/20   Kameron Stevens MD     Allergies   Allergen Reactions    Darvocet A500 [Propoxyphene N-Acetaminophen] Hives    Sertraline Other (comments)     Intolerance         Objective:     Visit Vitals  BP (!) 151/91   Pulse 85   Temp 99.3 °F (37.4 °C)   Resp 20   Ht 5' 5\" (1.651 m)   Wt 296 lb (134.3 kg)   SpO2 98%   BMI 49.26 kg/m²       Data Review:  UGI series: small hiatal hernia. Assessment:     Morbid obesity with multiple comorbidities. No success with medical management. She has completed all preoperative prerequisites and been found to be a good candidate for weight reduction surgery. Plan:     Continue diet (low calorie, low carbohydrate). Continue exercise regimen to include working with . We will submit for preauthorization for laparoscopic gastric bypass.     20 minutes spent with patient (greater than 50% of time in face-face consultation reviewing preoperative work-up, choice of procedure, anticipated hospital course, postoperative diet, activity restrictions).       Signed By: Kalie Davis MD     May 18, 2021

## 2021-05-19 NOTE — PATIENT INSTRUCTIONS
Learning About Weight-Loss (Bariatric) Surgery What is weight-loss surgery? Bariatric surgery is surgery to help you lose weight. This type of surgery is only used for people who are very overweight and have not been able to lose weight with diet and exercise. This surgery makes the stomach smaller. Some types of surgery also change the connection between your stomach and intestines. Having weight-loss surgery is a big step. After surgery, you'll need to make new, lifelong changes in how you eat and drink. How is weight-loss surgery done? Bariatric surgery may be either \"open\" or \"laparoscopic. \" Open surgery is done through a large cut (incision) in the belly. Laparoscopic surgery is done through several small cuts. The doctor puts a lighted tube, or scope, and other surgical tools through small cuts in your belly. The doctor is able to see your organs with the scope. There are different types of bariatric surgery. Gastric sleeve surgery The surgery is usually done through several small incisions in the belly. The doctor removes more than half of your stomach. This leaves a thin sleeve, or tube, that is about the size of a banana. Because part of your stomach has been removed, this can't be reversed. Jimena-en-Y gastric bypass surgery Jimena-en-Y (say \"mihir-en-why\") surgery changes the connection between the stomach and the intestines. The doctor separates a section of your stomach from the rest of your stomach. This makes a small pouch. The new pouch will hold the food you eat. The doctor connects the stomach pouch to the middle part of the small intestine. Gastric banding surgery The surgery is usually done through several small incisions in the belly. The doctor wraps a band around the upper part of the stomach. This creates a small pouch. The small size of the pouch means that you will get full after you eat just a small amount of food.  The doctor can inflate or deflate the band to adjust the size. This lets the doctor adjust how quickly food passes from the new pouch into the stomach. It does not change the connection between the stomach and the intestines. What can you expect after the surgery? You may stay in the hospital for one or more days after the surgery. How long you stay depends on the type of surgery you had. Most people need 2 to 4 weeks before they are ready to get back to their usual routine. For the first 2 to 6 weeks after surgery, you probably will need to follow a liquid or soft diet. Bit by bit, you will be able to eat more solid foods. Your doctor may advise you to work with a dietitian. This way you'll be sure to get enough protein, vitamins, and minerals while you are losing weight. Even with a healthy diet, you may need to take vitamin and mineral supplements. After surgery, you will not be able to eat very much at one time. You will get full quickly. Try not to eat too much at one time or eat foods that are high in fat or sugar. If you do, you may vomit, get stomach pain, or have diarrhea. You probably will lose weight very quickly in the first few months after surgery. As time goes on, your weight loss will slow down. You will have regular doctor visits to check how you are doing. Think of bariatric surgery as a tool to help you lose weight. It isn't an instant fix. You will still need to eat a healthy diet and get regular exercise. This will help you reach your weight goal and avoid regaining the weight you lose. Follow-up care is a key part of your treatment and safety. Be sure to make and go to all appointments, and call your doctor if you are having problems. It's also a good idea to know your test results and keep a list of the medicines you take. Where can you learn more? Go to http://www.gray.com/ Enter G469 in the search box to learn more about \"Learning About Weight-Loss (Bariatric) Surgery. \" Current as of: September 23, 2020               Content Version: 12.8 © 3448-4944 [employer]. Care instructions adapted under license by [employer] (which disclaims liability or warranty for this information). If you have questions about a medical condition or this instruction, always ask your healthcare professional. [address] any warranty or liability for your use of this information.

## 2021-06-01 ENCOUNTER — DOCUMENTATION ONLY (OUTPATIENT)
Dept: SLEEP MEDICINE | Age: 46
End: 2021-06-01

## 2021-06-01 NOTE — PROGRESS NOTES
Reviewed chart - patient missed 1st adherence on 5/26/2021. Notes indicated that patient had issues with PAP device. A call to Nely Mckeon was made today to check if a follow-up call has been extended to patient to address concerns with equipment.   Awaiting call back from Eloise at [facility].

## 2021-06-21 ENCOUNTER — HOSPITAL ENCOUNTER (OUTPATIENT)
Dept: PREADMISSION TESTING | Age: 46
Discharge: HOME | End: 2021-06-21
Payer: MEDICAID

## 2021-06-21 ENCOUNTER — HOSPITAL ENCOUNTER (OUTPATIENT)
Dept: GENERAL RADIOLOGY | Age: 46
Discharge: HOME | End: 2021-06-21
Attending: SURGERY
Payer: MEDICAID

## 2021-06-21 VITALS
HEIGHT: 65 IN | SYSTOLIC BLOOD PRESSURE: 120 MMHG | HEART RATE: 80 BPM | TEMPERATURE: 98.9 F | WEIGHT: 293 LBS | BODY MASS INDEX: 48.82 KG/M2 | DIASTOLIC BLOOD PRESSURE: 84 MMHG

## 2021-06-21 LAB
ALBUMIN SERPL-MCNC: 3.6 G/DL (ref 3.5–5)
ALBUMIN/GLOB SERPL: 1 {RATIO} (ref 1.1–2.2)
ALP SERPL-CCNC: 56 U/L (ref 45–117)
ALT SERPL-CCNC: 20 U/L (ref 12–78)
ANION GAP SERPL CALC-SCNC: 4 MMOL/L (ref 5–15)
APPEARANCE UR: CLEAR
AST SERPL-CCNC: 15 U/L (ref 15–37)
ATRIAL RATE: 71 BPM
BACTERIA URNS QL MICRO: NEGATIVE /HPF
BASOPHILS # BLD: 0 K/UL (ref 0–0.1)
BASOPHILS NFR BLD: 1 % (ref 0–1)
BILIRUB SERPL-MCNC: 0.3 MG/DL (ref 0.2–1)
BILIRUB UR QL: NEGATIVE
BUN SERPL-MCNC: 12 MG/DL (ref 6–20)
BUN/CREAT SERPL: 14 (ref 12–20)
CALCIUM SERPL-MCNC: 8.5 MG/DL (ref 8.5–10.1)
CALCULATED R AXIS, ECG10: -21 DEGREES
CALCULATED T AXIS, ECG11: -7 DEGREES
CHLORIDE SERPL-SCNC: 107 MMOL/L (ref 97–108)
CHOLEST SERPL-MCNC: 173 MG/DL
CO2 SERPL-SCNC: 29 MMOL/L (ref 21–32)
COLOR UR: NORMAL
CREAT SERPL-MCNC: 0.85 MG/DL (ref 0.55–1.02)
DIAGNOSIS, 93000: NORMAL
DIFFERENTIAL METHOD BLD: NORMAL
EOSINOPHIL # BLD: 0.1 K/UL (ref 0–0.4)
EOSINOPHIL NFR BLD: 2 % (ref 0–7)
EPITH CASTS URNS QL MICRO: NORMAL /LPF
ERYTHROCYTE [DISTWIDTH] IN BLOOD BY AUTOMATED COUNT: 12.8 % (ref 11.5–14.5)
GLOBULIN SER CALC-MCNC: 3.7 G/DL (ref 2–4)
GLUCOSE SERPL-MCNC: 119 MG/DL (ref 65–100)
GLUCOSE UR STRIP.AUTO-MCNC: NEGATIVE MG/DL
HCT VFR BLD AUTO: 43.3 % (ref 35–47)
HGB BLD-MCNC: 13.8 G/DL (ref 11.5–16)
HGB UR QL STRIP: NEGATIVE
HYALINE CASTS URNS QL MICRO: NORMAL /LPF (ref 0–5)
IMM GRANULOCYTES # BLD AUTO: 0 K/UL (ref 0–0.04)
IMM GRANULOCYTES NFR BLD AUTO: 0 % (ref 0–0.5)
KETONES UR QL STRIP.AUTO: NEGATIVE MG/DL
LEUKOCYTE ESTERASE UR QL STRIP.AUTO: NEGATIVE
LYMPHOCYTES # BLD: 2.8 K/UL (ref 0.8–3.5)
LYMPHOCYTES NFR BLD: 35 % (ref 12–49)
MAGNESIUM SERPL-MCNC: 2.1 MG/DL (ref 1.6–2.4)
MCH RBC QN AUTO: 29.6 PG (ref 26–34)
MCHC RBC AUTO-ENTMCNC: 31.9 G/DL (ref 30–36.5)
MCV RBC AUTO: 92.7 FL (ref 80–99)
MONOCYTES # BLD: 0.8 K/UL (ref 0–1)
MONOCYTES NFR BLD: 10 % (ref 5–13)
NEUTS SEG # BLD: 4.1 K/UL (ref 1.8–8)
NEUTS SEG NFR BLD: 52 % (ref 32–75)
NITRITE UR QL STRIP.AUTO: NEGATIVE
NRBC # BLD: 0 K/UL (ref 0–0.01)
NRBC BLD-RTO: 0 PER 100 WBC
P-R INTERVAL, ECG05: 160 MS
PH UR STRIP: 5.5 [PH] (ref 5–8)
PLATELET # BLD AUTO: 236 K/UL (ref 150–400)
PMV BLD AUTO: 11.5 FL (ref 8.9–12.9)
POTASSIUM SERPL-SCNC: 4.6 MMOL/L (ref 3.5–5.1)
PROT SERPL-MCNC: 7.3 G/DL (ref 6.4–8.2)
PROT UR STRIP-MCNC: NEGATIVE MG/DL
Q-T INTERVAL, ECG07: 422 MS
QRS DURATION, ECG06: 82 MS
QTC CALCULATION (BEZET), ECG08: 458 MS
RBC # BLD AUTO: 4.67 M/UL (ref 3.8–5.2)
RBC #/AREA URNS HPF: NORMAL /HPF (ref 0–5)
SODIUM SERPL-SCNC: 140 MMOL/L (ref 136–145)
SP GR UR REFRACTOMETRY: 1.02 (ref 1–1.03)
T4 FREE SERPL-MCNC: 0.7 NG/DL (ref 0.8–1.5)
TSH SERPL DL<=0.05 MIU/L-ACNC: 15.2 UIU/ML (ref 0.36–3.74)
UA: UC IF INDICATED,UAUC: NORMAL
UROBILINOGEN UR QL STRIP.AUTO: 0.2 EU/DL (ref 0.2–1)
VENTRICULAR RATE, ECG03: 71 BPM
WBC # BLD AUTO: 7.8 K/UL (ref 3.6–11)
WBC URNS QL MICRO: NORMAL /HPF (ref 0–4)

## 2021-06-21 PROCEDURE — 84443 ASSAY THYROID STIM HORMONE: CPT

## 2021-06-21 PROCEDURE — 36415 COLL VENOUS BLD VENIPUNCTURE: CPT

## 2021-06-21 PROCEDURE — 84439 ASSAY OF FREE THYROXINE: CPT

## 2021-06-21 PROCEDURE — 80053 COMPREHEN METABOLIC PANEL: CPT

## 2021-06-21 PROCEDURE — 81001 URINALYSIS AUTO W/SCOPE: CPT

## 2021-06-21 PROCEDURE — 71046 X-RAY EXAM CHEST 2 VIEWS: CPT

## 2021-06-21 PROCEDURE — 85025 COMPLETE CBC W/AUTO DIFF WBC: CPT

## 2021-06-21 PROCEDURE — 93005 ELECTROCARDIOGRAM TRACING: CPT

## 2021-06-21 PROCEDURE — 83735 ASSAY OF MAGNESIUM: CPT

## 2021-06-21 PROCEDURE — 82465 ASSAY BLD/SERUM CHOLESTEROL: CPT

## 2021-06-21 RX ORDER — BISMUTH SUBSALICYLATE 262 MG
1 TABLET,CHEWABLE ORAL DAILY
COMMUNITY

## 2021-06-21 NOTE — PERIOP NOTES
PREOPERATIVE INSTRUCTIONS REVIEWED WITH PATIENT. PATIENT GIVEN TWO-- BOTTLES OF CHG SOAPS  INSTRUCTIONS REVIEWED ON USE OF CHG SOAPS   PATIENT GIVEN SSI INFECTIONS SHEET. PATIENT WAS GIVEN THE OPPORTUNITY TO ASK QUESTIONS ON THE INFORMATION PROVIDED. FULLY VACCINATED WITH [facility] VACCINE ,4/5/21,4/28/21,COPY IS IN THE CHART.

## 2021-06-22 ENCOUNTER — OFFICE VISIT (OUTPATIENT)
Dept: SURGERY | Age: 46
End: 2021-06-22

## 2021-06-22 VITALS
TEMPERATURE: 98.5 F | WEIGHT: 293 LBS | RESPIRATION RATE: 19 BRPM | SYSTOLIC BLOOD PRESSURE: 134 MMHG | DIASTOLIC BLOOD PRESSURE: 93 MMHG | HEIGHT: 65 IN | OXYGEN SATURATION: 97 % | BODY MASS INDEX: 48.82 KG/M2 | HEART RATE: 91 BPM

## 2021-06-22 DIAGNOSIS — I10 ESSENTIAL HYPERTENSION: ICD-10-CM

## 2021-06-22 DIAGNOSIS — Z99.89 OSA ON CPAP: ICD-10-CM

## 2021-06-22 DIAGNOSIS — E66.01 MORBID OBESITY (HCC): Primary | ICD-10-CM

## 2021-06-22 DIAGNOSIS — K75.81 NASH (NONALCOHOLIC STEATOHEPATITIS): ICD-10-CM

## 2021-06-22 DIAGNOSIS — G47.33 OSA ON CPAP: ICD-10-CM

## 2021-06-22 DIAGNOSIS — E03.4 HYPOTHYROIDISM DUE TO ACQUIRED ATROPHY OF THYROID: ICD-10-CM

## 2021-06-22 DIAGNOSIS — G89.18 POST-OP PAIN: ICD-10-CM

## 2021-06-22 PROBLEM — R06.83 SNORING: Status: RESOLVED | Noted: 2020-11-05 | Resolved: 2021-06-22

## 2021-06-22 RX ORDER — ONDANSETRON 4 MG/1
4 TABLET, ORALLY DISINTEGRATING ORAL
Qty: 20 TABLET | Refills: 0 | Status: SHIPPED | OUTPATIENT
Start: 2021-06-22 | End: 2021-08-02

## 2021-06-22 RX ORDER — GABAPENTIN 100 MG/1
100-200 CAPSULE ORAL
Qty: 30 CAPSULE | Refills: 0 | Status: SHIPPED | OUTPATIENT
Start: 2021-06-22 | End: 2021-06-27

## 2021-06-22 RX ORDER — POLYETHYLENE GLYCOL 3350 17 G/17G
17 POWDER, FOR SOLUTION ORAL DAILY
Qty: 90 PACKET | Refills: 3 | Status: SHIPPED | OUTPATIENT
Start: 2021-06-22 | End: 2021-08-02

## 2021-06-22 RX ORDER — OMEPRAZOLE 40 MG/1
40 CAPSULE, DELAYED RELEASE ORAL DAILY
Qty: 30 CAPSULE | Refills: 1 | Status: SHIPPED | OUTPATIENT
Start: 2021-06-22 | End: 2022-02-04 | Stop reason: ALTCHOICE

## 2021-06-22 NOTE — PROGRESS NOTES
1. Have you been to the ER, urgent care clinic since your last visit? Hospitalized since your last visit? No     2. Have you seen or consulted any other health care providers outside of the [address] [place] [address] since your last visit? Include any pap smears or colon screening.  No

## 2021-06-22 NOTE — PATIENT INSTRUCTIONS
Start the pre op diet 6/28/21 Increase your thyroid medication to 1.5 tablets every morning. Take with water on an empty stomach and without other medications or vitamins Your thyroid levels must be normal (or very close) to proceed with surgery. Please let us know after your endocrinology appt Vitamins:  
 
For an \"all in one\" bariatric supplement go to www. [employer]. com and look for the Opurity Multivitamins with iron for gastric bypass or sleeve. You can take 2 capsules or 1 chewable and get what you need for a reasonable cost about $35 for a 3 months supply Day Before Surgery: 
 -  take (2) Extra Strength Tylenol (acetaminophen) at noon and 8 pm  
 -  you may drink sugar free clear liquids until 3 hours prior to surgery  your after surgery prescriptions BEFORE surgery Make your 2, 4 and 6 week appointments for after surgery

## 2021-06-22 NOTE — Clinical Note
TSH 15. I adjusted her dose of levothyroxine and she sees endocrine 7/2/21.   If not euthyroid or close I've advised patient will need to postpone surgery - thanks JM

## 2021-06-22 NOTE — PROGRESS NOTES
Chief Complaint   Patient presents with    Surg H&P     Lap Bypass w/Dr. Lisbeth Ardon on 07/14/21       Santosh Santiago presents today for presurgical visit in preparation for weight loss surgery. Santosh Santiago has completed the pre-surgical requirements, classes and demonstrated readiness for surgery. Patient has had no recent illness, fevers or cough. Santosh Santiago has noted abnormal thyroid levels x 6 months. TSH in April was > 20 and now is at 15. She has been on 175 mcg of levothyroxine and recently increased to [facility] and a third tabs\" until she can see endocrine on 7/2/21. Advised to increase levothyroxine 175 mcg to 1.5 tabs qam.  Advised to take with water on an empty stomach and without other medications or vitamins. Surgery Type: Gastric bypass  Date of Surgery: 7/14/21  Surgeon:  Lucy Meng MD   Start Pre op Diet: 6/28/21    Preadmission testing reviewed face to face with Santosh Santiago. Co-Morbidities:  - LONDON on CPAP   - HTN  - CEE  - hypothyroidism:  TSH 15   - GERD with hiatal hernia     Functional status: Independent    Social support: Fiance and daughter       Body mass index is 49.19 kg/m². ICD-10-CM ICD-9-CM    1. Morbid obesity ([facility] [address]  E66.01 278.01    2. BMI 45.0-49.9, adult (HCC)  Z68.42 V85.42    3. LONDON on CPAP  G47.33 327.23     Z99.89 V46.8    4. CEE (nonalcoholic steatohepatitis)  K75.81 571.8    5. Essential hypertension  I10 401.9    6. Hypothyroidism due to acquired atrophy of thyroid  E03.4 244.8      246.8    7. Post-op pain  G89.18 338.18 gabapentin (NEURONTIN) 100 mg capsule       Plan:  1. Morbid obesity:  Scheduled for gastric bypass on 7/14/21 with Dr. Lucy Meng MD     Must be euthyroid (or very close to it) prior to surgery. Thyroid dose adjusted today and has endocrine appt 7/2/21. Patient will send message after she sees endocrine and has repeat labs.     If thyroid not at goal, will need to postpone surgery and patient is aware.      ERAS protocol reviewed:  Acetaminophen 1000 mg at noon and 8 pm day before surgery and may have clear liquids (no caffeine, no ETOH, no carbonation and calorie free) until 3 hours prior to surgery   Preadmission testing results reviewed with patient   Post operative prescriptions sent to pharmacy on file for AFTER surgery:    Acid suppression  x 30 days, then reassess need Omeprazole 40 mg every day   Antiemetic  zofran  Antispasmodic n/a  Laxative:  Miralax 1 packet every day #90/3R   DVT prophylaxis: Early ambulation, SCDs, BMI < 50, non smoker, no personal hx or FH of VTE, no hormones   Post op pain management:  Gabapentin 100-300 mg q 8 hours prn pain x 5 days; acetaminophen 1000 mg po q 8 hours prn; abdominal support / splinting; heating pad prn     Started on liver shrinking diet: 6/28/21  Reviewed and started Bariatric vitamins   Reviewed post operative diet, restrictions, follow up and medications  Post operative 2, 4 and 6  week appointments to be made  Reviewed educational materials and book    Louie Bruno is scheduled to meet with Eddie Batres MD   on 6/29/21 to sign consents and address any final questions or concerns. Louie Bruno verbalized understanding and questions were answered to the best of my knowledge and ability. Pre and post op educational materials were provided.     30 minutes spent virtually with patient

## 2021-06-24 ENCOUNTER — VIRTUAL VISIT (OUTPATIENT)
Dept: ENDOCRINOLOGY | Age: 46
End: 2021-06-24
Payer: MEDICAID

## 2021-06-24 DIAGNOSIS — E03.9 HYPOTHYROIDISM, UNSPECIFIED TYPE: Primary | ICD-10-CM

## 2021-06-24 PROCEDURE — 99204 OFFICE O/P NEW MOD 45 MIN: CPT | Performed by: INTERNAL MEDICINE

## 2021-06-24 RX ORDER — LEVOTHYROXINE SODIUM 200 UG/1
200 TABLET ORAL
Qty: 30 TABLET | Refills: 1 | Status: SHIPPED | OUTPATIENT
Start: 2021-06-24 | End: 2021-07-27 | Stop reason: SDUPTHER

## 2021-06-24 RX ORDER — LEVOTHYROXINE SODIUM 25 UG/1
25 TABLET ORAL
Qty: 30 TABLET | Refills: 2 | Status: SHIPPED | OUTPATIENT
Start: 2021-06-24 | End: 2021-07-09 | Stop reason: ALTCHOICE

## 2021-06-24 NOTE — PROGRESS NOTES
Chief Complaint   Patient presents with    New Patient     Doxy: [phone]     Thyroid Problem    Other     [address]      History of Present Illness: Joelle Hamilton is a 54 y.o. female with a past medical history significant for ADD, anxiety, chronic pain, hypertension, obstructive sleep apnea seen in self-referral for discussion related to hypothyroidism. Is scheduled to have janice-en-y on July 14th- Stan Donis MD with [employer]. 04/12 labs was taking 175mcg. Hadn't heard anything back from MD who ordered labs, was tired of feeling sluggish, was taking 175mcg and every other day 1/3 of another tablet. Started that in May. Was taking levothyroxine with BP pill. Was taking multivitamin right after levothyroxine starting in May. Wasn't waiting an hour to drink cup of coffee. Weighs 295lbs- 133 kg--> 212mcg. 3 days has been taking 1.5 tablets 262mcg. Past Medical History:   Diagnosis Date    ADD (attention deficit disorder)     Anxiety     Anxiety     Carpal tunnel syndrome     left    Chronic pain     COVID-19 ruled out     COVID-19 vaccine series completed     4/5/21,4/28/21    DJD (degenerative joint disease)     Endocrine disease     hypothyroidism    H. PYLORI INFECTION     treated with triple therapy in Fall 2008    Hypertension     Sleep apnea     Unspecified hypothyroidism      Past Surgical History:   Procedure Laterality Date    HX GYN      Bartholin cyst removal    HX TONSILLECTOMY  1988    HX TUBAL LIGATION  2005    NM EXCIS BARTHOLIN GLAND/CYST       Current Outpatient Medications   Medication Sig    gabapentin (NEURONTIN) 100 mg capsule Take 1-2 Capsules by mouth every eight (8) hours as needed for Pain (AFTER SURGERY) for up to 5 days. Max Daily Amount: 600 mg.  levothyroxine (SYNTHROID) 175 mcg tablet Take 1 Tab by mouth Daily (before breakfast). (Patient taking differently: Take 175 mcg by mouth Daily (before breakfast).  1 and a 1/2 tab before breakfast)    spironolactone-hydrochlorothiazide (ALDACTAZIDE) 25-25 mg per tablet Take 1 Tab by mouth daily. (Patient taking differently: Take 1 Tablet by mouth daily (after breakfast). )    ondansetron (ZOFRAN ODT) 4 mg disintegrating tablet Take 1 Tablet by mouth every eight (8) hours as needed for Nausea or Nausea or Vomiting (AFTER SURGERY).  polyethylene glycol (MIRALAX) 17 gram packet Take 1 Packet by mouth daily. Indications: constipation    omeprazole (PRILOSEC) 40 mg capsule Take 1 Capsule by mouth daily. AFTER SURGERY    multivitamin (ONE A DAY) tablet Take 1 Tablet by mouth daily. No current facility-administered medications for this visit.    no biotin     Allergies   Allergen Reactions    [de-identified] A500 [Propoxyphene N-Acetaminophen] Hives    Sertraline Other (comments)     Intolerance     Family History   Problem Relation Age of Onset    Hypertension Mother     Hypertension Father     Stroke Father         age 79    Thyroid Disease Sister         hypothyroidism    Arthritis-rheumatoid Sister     Anesth Problems Neg Hx      Social Hx: Lives in [facility] [address]:  - Constitutional Symptoms: Endorses fatigue  - Eyes: no blurry vision or double vision  - Cardiovascular: no chest pain or palpitations  - Respiratory: no cough or shortness of breath  - Gastrointestinal: no dysphagia or abdominal pain  - Musculoskeletal: no joint pains or weakness  - Integumentary: no rashes  - Neurological: no numbness, tingling, or headaches  - Psychiatric: no depression or anxiety  - Endocrine: no heat or cold intolerance, no polyuria or polydipsia    - Physical Examination:  - - GENERAL: NCAT, Appears well nourished   - EYES: EOMI, non-icteric, no proptosis   - Ear/Nose/Throat: NCAT, no visible inflammation or masses   - CARDIOVASCULAR: no cyanosis, no visible JVD   - RESPIRATORY: respiratory effort normal without any distress or labored breathing   - MUSCULOSKELETAL: Normal ROM of neck and upper extremities observed   - SKIN: No rash on face  - NEUROLOGIC:  No facial asymmetry (Cranial nerve 7 motor function), No gaze palsy   - PSYCHIATRIC: Normal affect, Normal insight and judgement     Data Reviewed:       Assessment/Plan: This is a very pleasant 69-year-old female seen in self-referral for discussion related to levothyroxine dosing. She increased her levothyroxine dose between April and June but despite this TSH did not normalize and free T4 dropped. Explained that she needs to separate levothyroxine from divalent cations by 4 hours. Current dose of levothyroxine is 262 mcg which is far too much. Reduced to 225 mcg until July 07 at which point we will obtain TSH and free T4 levels. TSH will lag behind the free T4 but as long as the free T4 is normal, it is okay to proceed with surgery.    -maximum weight based dose is 212mcg (current weight)  -discontinue levothyroxine 175mcg 1.5 tablets, this is too much  -increase levothyroxine from 175mcg to 225mcg temporarily  -repeat Free T4 level July 7th, 1 week before surgery  -TSH will lag behind the Free T4, as long as Free T4 is normal, safe to proceed with surgery  -repeat labs on August 24th to see if TSH has dropped appropriately, anticipate reducing to about 200mcg  -will need repeat labs 8 weeks later pending weight loss  -be sure to separate levothyroxine from divalent cations including mg2+/Fe2+/ca2+ as this is why TSH was increasing despite dose increase - 4 hours minimum    Copy sent to:Sarah Juan MD, Nanette Salmon MD    Ben Davis is a 54 y.o. female being evaluated by a Virtual Visit (video visit) encounter to address concerns as mentioned above. A caregiver was present when appropriate. Due to this being a TeleHealth encounter (During BDUBW-83 public health emergency), evaluation of the following organ systems was limited:     Vitals/Constitutional/EENT/Resp/CV/GI//MS/Neuro/Skin/Heme-Lymph-Imm.   Pursuant to the emergency declaration under the [facility] [address] authority and the [employer] Act, this Virtual Visit was conducted with patient's (and/or legal guardian's) consent, to reduce the risk of exposure to COVID-19 and provide necessary medical care. Services were provided through a video synchronous discussion virtually to substitute for in-person encounter. --Ruby Conner MD on 6/24/2021 at 8:27 AM    An electronic signature was used to authenticate this note.

## 2021-06-24 NOTE — PROGRESS NOTES
Lab Results   Component Value Date/Time    TSH 15.20 (H) 06/21/2021 10:02 AM    T4, Free 0.7 (L) 06/21/2021 10:02 AM

## 2021-06-29 ENCOUNTER — OFFICE VISIT (OUTPATIENT)
Dept: SURGERY | Age: 46
End: 2021-06-29

## 2021-06-29 VITALS
WEIGHT: 289.5 LBS | BODY MASS INDEX: 48.23 KG/M2 | TEMPERATURE: 98 F | SYSTOLIC BLOOD PRESSURE: 127 MMHG | RESPIRATION RATE: 20 BRPM | HEIGHT: 65 IN | DIASTOLIC BLOOD PRESSURE: 82 MMHG | HEART RATE: 69 BPM | OXYGEN SATURATION: 96 %

## 2021-06-29 DIAGNOSIS — G47.33 OBSTRUCTIVE SLEEP APNEA: ICD-10-CM

## 2021-06-29 DIAGNOSIS — I10 ESSENTIAL HYPERTENSION: ICD-10-CM

## 2021-06-29 DIAGNOSIS — K21.9 GASTROESOPHAGEAL REFLUX DISEASE WITHOUT ESOPHAGITIS: ICD-10-CM

## 2021-06-29 DIAGNOSIS — E78.5 HYPERLIPIDEMIA, UNSPECIFIED HYPERLIPIDEMIA TYPE: ICD-10-CM

## 2021-06-29 DIAGNOSIS — E66.01 MORBID OBESITY (HCC): Primary | ICD-10-CM

## 2021-06-29 NOTE — PROGRESS NOTES
1. Have you been to the ER, urgent care clinic since your last visit? Hospitalized since your last visit? No    2. Have you seen or consulted any other health care providers outside of the [address] [place] [address] since your last visit? Include any pap smears or colon screening.  No

## 2021-07-09 ENCOUNTER — TELEPHONE (OUTPATIENT)
Dept: SURGERY | Age: 46
End: 2021-07-09

## 2021-07-09 DIAGNOSIS — E03.9 HYPOTHYROIDISM, UNSPECIFIED TYPE: Primary | ICD-10-CM

## 2021-07-09 NOTE — TELEPHONE ENCOUNTER
Returned all to patient. Left voicemail for return call. There are TSH results in patients chart from yesterday.

## 2021-07-09 NOTE — LETTER
7/9/2021    Ms. [address] [id number]      Dear Becca Caicedo:    Please find your most recent results below. Resulted Orders   TSH 3RD GENERATION   Result Value Ref Range    TSH 0.65 0.36 - 3.74 uIU/mL   T4, FREE   Result Value Ref Range    T4, Free 1.8 (H) 0.8 - 1.5 NG/DL         RECOMMENDATIONS:    Diego Villatoro,    It is not surprising that your Free T4 is a touch high- at least the TSH has normalized quickly; we will now drop the dose to 200mcg alone. Do not take the additional 25mcg. It is safe to proceed with surgery; if the surgeon prefers to repeat a Free T4 I would be willing to order that but I do not think it's necessary. We will repeat labs in 2 months to see if we need to drop down further from 200mcg.     Please call me if you have any questions: [phone]    Sincerely,      Suki De Jesus MD

## 2021-07-14 ENCOUNTER — HOSPITAL ENCOUNTER (INPATIENT)
Age: 46
LOS: 2 days | Discharge: HOME | DRG: 403 | End: 2021-07-16
Attending: SURGERY | Admitting: SURGERY
Payer: MEDICAID

## 2021-07-14 ENCOUNTER — ANESTHESIA EVENT (OUTPATIENT)
Dept: SURGERY | Age: 46
DRG: 403 | End: 2021-07-14
Payer: MEDICAID

## 2021-07-14 ENCOUNTER — ANESTHESIA (OUTPATIENT)
Dept: SURGERY | Age: 46
DRG: 403 | End: 2021-07-14
Payer: MEDICAID

## 2021-07-14 DIAGNOSIS — E66.01 MORBID OBESITY (HCC): ICD-10-CM

## 2021-07-14 LAB — HCG UR QL: NEGATIVE

## 2021-07-14 PROCEDURE — 77030037366 HC STPLR ENDO TRI-STPLR COVD -C: Performed by: SURGERY

## 2021-07-14 PROCEDURE — 77030008437 HC REINF STRP REINF SEMGD WLGO -C: Performed by: SURGERY

## 2021-07-14 PROCEDURE — 77030031139 HC SUT VCRL2 J&J -A: Performed by: SURGERY

## 2021-07-14 PROCEDURE — 77030040956 HC DSCTR SONICISION MEDT -I: Performed by: SURGERY

## 2021-07-14 PROCEDURE — 77030009957 HC RELD ENDOSTCH COVD -C: Performed by: SURGERY

## 2021-07-14 PROCEDURE — 0DJ08ZZ INSPECTION OF UPPER INTESTINAL TRACT, VIA NATURAL OR ARTIFICIAL OPENING ENDOSCOPIC: ICD-10-PCS | Performed by: SURGERY

## 2021-07-14 PROCEDURE — 77030020263 HC SOL INJ SOD CL0.9% LFCR 1000ML: Performed by: SURGERY

## 2021-07-14 PROCEDURE — 77030010507 HC ADH SKN DERMBND J&J -B: Performed by: SURGERY

## 2021-07-14 PROCEDURE — 76010000131 HC OR TIME 2 TO 2.5 HR: Performed by: SURGERY

## 2021-07-14 PROCEDURE — 65660000000 HC RM CCU STEPDOWN

## 2021-07-14 PROCEDURE — 77030016151 HC PROTCTR LNS DFOG COVD -B: Performed by: SURGERY

## 2021-07-14 PROCEDURE — 77030002916 HC SUT ETHLN J&J -A: Performed by: SURGERY

## 2021-07-14 PROCEDURE — 74011250636 HC RX REV CODE- 250/636: Performed by: NURSE ANESTHETIST, CERTIFIED REGISTERED

## 2021-07-14 PROCEDURE — 77030009965 HC RELD STPLR ENDOS COVD -D: Performed by: SURGERY

## 2021-07-14 PROCEDURE — 77030020829: Performed by: SURGERY

## 2021-07-14 PROCEDURE — 43644 LAP GASTRIC BYPASS/ROUX-EN-Y: CPT | Performed by: SURGERY

## 2021-07-14 PROCEDURE — 77030002933 HC SUT MCRYL J&J -A: Performed by: SURGERY

## 2021-07-14 PROCEDURE — 77030008023 HC RELD SUT ENDOSC COVD -B: Performed by: SURGERY

## 2021-07-14 PROCEDURE — 77030037367 HC STPLR ENDO TRI-STPLR COVD -D: Performed by: SURGERY

## 2021-07-14 PROCEDURE — 74011000250 HC RX REV CODE- 250: Performed by: SURGERY

## 2021-07-14 PROCEDURE — 77030008756 HC TU IRR SUC STRY -B: Performed by: SURGERY

## 2021-07-14 PROCEDURE — 2709999900 HC NON-CHARGEABLE SUPPLY: Performed by: SURGERY

## 2021-07-14 PROCEDURE — 77030038157 HC DEV PWR CNTR DISP SIGNIA COVD -C: Performed by: SURGERY

## 2021-07-14 PROCEDURE — 77030012770 HC TRCR OPT FX AMR -B: Performed by: SURGERY

## 2021-07-14 PROCEDURE — 76210000016 HC OR PH I REC 1 TO 1.5 HR: Performed by: SURGERY

## 2021-07-14 PROCEDURE — 77030037032 HC INSRT SCIS CLICKLLINE DISP STOR -B: Performed by: SURGERY

## 2021-07-14 PROCEDURE — 81025 URINE PREGNANCY TEST: CPT

## 2021-07-14 PROCEDURE — 74011250636 HC RX REV CODE- 250/636: Performed by: SURGERY

## 2021-07-14 PROCEDURE — 77030038552 HC DRN WND MDII -A: Performed by: SURGERY

## 2021-07-14 PROCEDURE — 77030026438 HC STYL ET INTUB CARD -A: Performed by: ANESTHESIOLOGY

## 2021-07-14 PROCEDURE — 77030009956 HC RELD ENDOSTCH COVD -B: Performed by: SURGERY

## 2021-07-14 PROCEDURE — 74011000250 HC RX REV CODE- 250: Performed by: NURSE ANESTHETIST, CERTIFIED REGISTERED

## 2021-07-14 PROCEDURE — 77030039895 HC SYST SMK EVAC LAP COVD -B: Performed by: SURGERY

## 2021-07-14 PROCEDURE — 0D164ZA BYPASS STOMACH TO JEJUNUM, PERCUTANEOUS ENDOSCOPIC APPROACH: ICD-10-PCS | Performed by: SURGERY

## 2021-07-14 PROCEDURE — 74011250636 HC RX REV CODE- 250/636: Performed by: ANESTHESIOLOGY

## 2021-07-14 PROCEDURE — 74011000258 HC RX REV CODE- 258: Performed by: SURGERY

## 2021-07-14 PROCEDURE — 76060000035 HC ANESTHESIA 2 TO 2.5 HR: Performed by: SURGERY

## 2021-07-14 PROCEDURE — 77030040361 HC SLV COMPR DVT MDII -B: Performed by: SURGERY

## 2021-07-14 PROCEDURE — 77030002895 HC DEV VASC CLOSR COVD -B: Performed by: SURGERY

## 2021-07-14 PROCEDURE — 77030013079 HC BLNKT BAIR HGGR 3M -A: Performed by: ANESTHESIOLOGY

## 2021-07-14 PROCEDURE — 77030008684 HC TU ET CUF COVD -B: Performed by: ANESTHESIOLOGY

## 2021-07-14 PROCEDURE — 77030008608 HC TRCR ENDOSC SMTH AMR -B: Performed by: SURGERY

## 2021-07-14 PROCEDURE — 74011250637 HC RX REV CODE- 250/637: Performed by: SURGERY

## 2021-07-14 PROCEDURE — 77030008728 HC TU GAST LAPSCP APOL -C: Performed by: SURGERY

## 2021-07-14 RX ORDER — ONDANSETRON 2 MG/ML
4 INJECTION INTRAMUSCULAR; INTRAVENOUS AS NEEDED
Status: DISCONTINUED | OUTPATIENT
Start: 2021-07-14 | End: 2021-07-14 | Stop reason: HOSPADM

## 2021-07-14 RX ORDER — FENTANYL CITRATE 50 UG/ML
INJECTION, SOLUTION INTRAMUSCULAR; INTRAVENOUS AS NEEDED
Status: DISCONTINUED | OUTPATIENT
Start: 2021-07-14 | End: 2021-07-14 | Stop reason: HOSPADM

## 2021-07-14 RX ORDER — ONDANSETRON 2 MG/ML
INJECTION INTRAMUSCULAR; INTRAVENOUS AS NEEDED
Status: DISCONTINUED | OUTPATIENT
Start: 2021-07-14 | End: 2021-07-14 | Stop reason: HOSPADM

## 2021-07-14 RX ORDER — GLYCOPYRROLATE 0.2 MG/ML
INJECTION INTRAMUSCULAR; INTRAVENOUS AS NEEDED
Status: DISCONTINUED | OUTPATIENT
Start: 2021-07-14 | End: 2021-07-14 | Stop reason: HOSPADM

## 2021-07-14 RX ORDER — BUPIVACAINE HYDROCHLORIDE 5 MG/ML
INJECTION, SOLUTION EPIDURAL; INTRACAUDAL AS NEEDED
Status: DISCONTINUED | OUTPATIENT
Start: 2021-07-14 | End: 2021-07-14 | Stop reason: HOSPADM

## 2021-07-14 RX ORDER — SUCCINYLCHOLINE CHLORIDE 20 MG/ML
INJECTION INTRAMUSCULAR; INTRAVENOUS AS NEEDED
Status: DISCONTINUED | OUTPATIENT
Start: 2021-07-14 | End: 2021-07-14 | Stop reason: HOSPADM

## 2021-07-14 RX ORDER — HYOSCYAMINE SULFATE 0.12 MG/1
0.12 TABLET SUBLINGUAL
Status: DISCONTINUED | OUTPATIENT
Start: 2021-07-14 | End: 2021-07-16 | Stop reason: HOSPADM

## 2021-07-14 RX ORDER — FENTANYL CITRATE 50 UG/ML
25 INJECTION, SOLUTION INTRAMUSCULAR; INTRAVENOUS
Status: COMPLETED | OUTPATIENT
Start: 2021-07-14 | End: 2021-07-14

## 2021-07-14 RX ORDER — SODIUM CHLORIDE 0.9 % (FLUSH) 0.9 %
5-40 SYRINGE (ML) INJECTION EVERY 8 HOURS
Status: DISCONTINUED | OUTPATIENT
Start: 2021-07-14 | End: 2021-07-16 | Stop reason: HOSPADM

## 2021-07-14 RX ORDER — NEOSTIGMINE METHYLSULFATE 1 MG/ML
INJECTION, SOLUTION INTRAVENOUS AS NEEDED
Status: DISCONTINUED | OUTPATIENT
Start: 2021-07-14 | End: 2021-07-14 | Stop reason: HOSPADM

## 2021-07-14 RX ORDER — HYDROMORPHONE HYDROCHLORIDE 1 MG/ML
0.5 INJECTION, SOLUTION INTRAMUSCULAR; INTRAVENOUS; SUBCUTANEOUS
Status: DISCONTINUED | OUTPATIENT
Start: 2021-07-14 | End: 2021-07-16 | Stop reason: HOSPADM

## 2021-07-14 RX ORDER — SODIUM CHLORIDE, SODIUM LACTATE, POTASSIUM CHLORIDE, CALCIUM CHLORIDE 600; 310; 30; 20 MG/100ML; MG/100ML; MG/100ML; MG/100ML
500 INJECTION, SOLUTION INTRAVENOUS CONTINUOUS
Status: DISCONTINUED | OUTPATIENT
Start: 2021-07-14 | End: 2021-07-14

## 2021-07-14 RX ORDER — GABAPENTIN 100 MG/1
200 CAPSULE ORAL 2 TIMES DAILY
Status: DISCONTINUED | OUTPATIENT
Start: 2021-07-14 | End: 2021-07-16 | Stop reason: HOSPADM

## 2021-07-14 RX ORDER — MIDAZOLAM HYDROCHLORIDE 1 MG/ML
INJECTION, SOLUTION INTRAMUSCULAR; INTRAVENOUS AS NEEDED
Status: DISCONTINUED | OUTPATIENT
Start: 2021-07-14 | End: 2021-07-14 | Stop reason: HOSPADM

## 2021-07-14 RX ORDER — LIDOCAINE HYDROCHLORIDE ANHYDROUS AND DEXTROSE MONOHYDRATE .8; 5 G/100ML; G/100ML
INJECTION, SOLUTION INTRAVENOUS
Status: DISCONTINUED | OUTPATIENT
Start: 2021-07-14 | End: 2021-07-14 | Stop reason: HOSPADM

## 2021-07-14 RX ORDER — PROPOFOL 10 MG/ML
INJECTION, EMULSION INTRAVENOUS AS NEEDED
Status: DISCONTINUED | OUTPATIENT
Start: 2021-07-14 | End: 2021-07-14 | Stop reason: HOSPADM

## 2021-07-14 RX ORDER — ACETAMINOPHEN 500 MG
1000 TABLET ORAL EVERY 6 HOURS
Status: DISCONTINUED | OUTPATIENT
Start: 2021-07-14 | End: 2021-07-16 | Stop reason: HOSPADM

## 2021-07-14 RX ORDER — MIDAZOLAM HYDROCHLORIDE 1 MG/ML
1 INJECTION, SOLUTION INTRAMUSCULAR; INTRAVENOUS AS NEEDED
Status: DISCONTINUED | OUTPATIENT
Start: 2021-07-14 | End: 2021-07-14 | Stop reason: HOSPADM

## 2021-07-14 RX ORDER — ONDANSETRON 2 MG/ML
4 INJECTION INTRAMUSCULAR; INTRAVENOUS
Status: DISCONTINUED | OUTPATIENT
Start: 2021-07-14 | End: 2021-07-16 | Stop reason: HOSPADM

## 2021-07-14 RX ORDER — PHENYLEPHRINE HCL IN 0.9% NACL 0.4MG/10ML
SYRINGE (ML) INTRAVENOUS AS NEEDED
Status: DISCONTINUED | OUTPATIENT
Start: 2021-07-14 | End: 2021-07-14 | Stop reason: HOSPADM

## 2021-07-14 RX ORDER — KETAMINE HYDROCHLORIDE 10 MG/ML
INJECTION, SOLUTION INTRAMUSCULAR; INTRAVENOUS AS NEEDED
Status: DISCONTINUED | OUTPATIENT
Start: 2021-07-14 | End: 2021-07-14 | Stop reason: HOSPADM

## 2021-07-14 RX ORDER — SODIUM CHLORIDE 0.9 % (FLUSH) 0.9 %
5-40 SYRINGE (ML) INJECTION EVERY 8 HOURS
Status: DISCONTINUED | OUTPATIENT
Start: 2021-07-14 | End: 2021-07-14 | Stop reason: HOSPADM

## 2021-07-14 RX ORDER — BUPIVACAINE HYDROCHLORIDE 5 MG/ML
50 INJECTION, SOLUTION EPIDURAL; INTRACAUDAL ONCE
Status: DISCONTINUED | OUTPATIENT
Start: 2021-07-14 | End: 2021-07-14 | Stop reason: HOSPADM

## 2021-07-14 RX ORDER — NALOXONE HYDROCHLORIDE 0.4 MG/ML
0.4 INJECTION, SOLUTION INTRAMUSCULAR; INTRAVENOUS; SUBCUTANEOUS AS NEEDED
Status: DISCONTINUED | OUTPATIENT
Start: 2021-07-14 | End: 2021-07-16 | Stop reason: HOSPADM

## 2021-07-14 RX ORDER — HYDROMORPHONE HYDROCHLORIDE 1 MG/ML
1 INJECTION, SOLUTION INTRAMUSCULAR; INTRAVENOUS; SUBCUTANEOUS
Status: DISCONTINUED | OUTPATIENT
Start: 2021-07-14 | End: 2021-07-16 | Stop reason: HOSPADM

## 2021-07-14 RX ORDER — LIDOCAINE HYDROCHLORIDE 20 MG/ML
INJECTION, SOLUTION EPIDURAL; INFILTRATION; INTRACAUDAL; PERINEURAL AS NEEDED
Status: DISCONTINUED | OUTPATIENT
Start: 2021-07-14 | End: 2021-07-14 | Stop reason: HOSPADM

## 2021-07-14 RX ORDER — SODIUM CHLORIDE, SODIUM LACTATE, POTASSIUM CHLORIDE, CALCIUM CHLORIDE 600; 310; 30; 20 MG/100ML; MG/100ML; MG/100ML; MG/100ML
125 INJECTION, SOLUTION INTRAVENOUS CONTINUOUS
Status: DISCONTINUED | OUTPATIENT
Start: 2021-07-14 | End: 2021-07-16 | Stop reason: HOSPADM

## 2021-07-14 RX ORDER — DEXMEDETOMIDINE HYDROCHLORIDE 4 UG/ML
INJECTION, SOLUTION INTRAVENOUS AS NEEDED
Status: DISCONTINUED | OUTPATIENT
Start: 2021-07-14 | End: 2021-07-14 | Stop reason: HOSPADM

## 2021-07-14 RX ORDER — LORAZEPAM 2 MG/ML
1 INJECTION INTRAMUSCULAR
Status: DISCONTINUED | OUTPATIENT
Start: 2021-07-14 | End: 2021-07-16 | Stop reason: HOSPADM

## 2021-07-14 RX ORDER — SCOLOPAMINE TRANSDERMAL SYSTEM 1 MG/1
1 PATCH, EXTENDED RELEASE TRANSDERMAL ONCE
Status: DISCONTINUED | OUTPATIENT
Start: 2021-07-14 | End: 2021-07-16 | Stop reason: HOSPADM

## 2021-07-14 RX ORDER — SODIUM CHLORIDE 0.9 % (FLUSH) 0.9 %
5-40 SYRINGE (ML) INJECTION AS NEEDED
Status: DISCONTINUED | OUTPATIENT
Start: 2021-07-14 | End: 2021-07-14 | Stop reason: HOSPADM

## 2021-07-14 RX ORDER — DEXAMETHASONE SODIUM PHOSPHATE 4 MG/ML
INJECTION, SOLUTION INTRA-ARTICULAR; INTRALESIONAL; INTRAMUSCULAR; INTRAVENOUS; SOFT TISSUE AS NEEDED
Status: DISCONTINUED | OUTPATIENT
Start: 2021-07-14 | End: 2021-07-14 | Stop reason: HOSPADM

## 2021-07-14 RX ORDER — SODIUM CHLORIDE 0.9 % (FLUSH) 0.9 %
5-40 SYRINGE (ML) INJECTION AS NEEDED
Status: DISCONTINUED | OUTPATIENT
Start: 2021-07-14 | End: 2021-07-16 | Stop reason: HOSPADM

## 2021-07-14 RX ORDER — GABAPENTIN 250 MG/5ML
500 SOLUTION ORAL ONCE
Status: COMPLETED | OUTPATIENT
Start: 2021-07-14 | End: 2021-07-14

## 2021-07-14 RX ORDER — ROCURONIUM BROMIDE 10 MG/ML
INJECTION, SOLUTION INTRAVENOUS AS NEEDED
Status: DISCONTINUED | OUTPATIENT
Start: 2021-07-14 | End: 2021-07-14 | Stop reason: HOSPADM

## 2021-07-14 RX ORDER — MAGNESIUM SULFATE HEPTAHYDRATE 40 MG/ML
INJECTION, SOLUTION INTRAVENOUS AS NEEDED
Status: DISCONTINUED | OUTPATIENT
Start: 2021-07-14 | End: 2021-07-14 | Stop reason: HOSPADM

## 2021-07-14 RX ADMIN — GLYCOPYRROLATE 0.4 MG: 0.2 INJECTION, SOLUTION INTRAMUSCULAR; INTRAVENOUS at 11:34

## 2021-07-14 RX ADMIN — HYOSCYAMINE SULFATE 0.12 MG: 0.12 TABLET ORAL; SUBLINGUAL at 17:22

## 2021-07-14 RX ADMIN — SODIUM CHLORIDE, POTASSIUM CHLORIDE, SODIUM LACTATE AND CALCIUM CHLORIDE 125 ML/HR: 600; 310; 30; 20 INJECTION, SOLUTION INTRAVENOUS at 20:57

## 2021-07-14 RX ADMIN — ROCURONIUM BROMIDE 5 MG: 10 SOLUTION INTRAVENOUS at 10:58

## 2021-07-14 RX ADMIN — KETAMINE HYDROCHLORIDE 50 MG: 10 INJECTION, SOLUTION INTRAMUSCULAR; INTRAVENOUS at 09:50

## 2021-07-14 RX ADMIN — GABAPENTIN 200 MG: 100 CAPSULE ORAL at 17:14

## 2021-07-14 RX ADMIN — FENTANYL CITRATE 25 MCG: 50 INJECTION, SOLUTION INTRAMUSCULAR; INTRAVENOUS at 10:02

## 2021-07-14 RX ADMIN — PROPOFOL 200 MG: 10 INJECTION, EMULSION INTRAVENOUS at 09:36

## 2021-07-14 RX ADMIN — FENTANYL CITRATE 50 MCG: 50 INJECTION, SOLUTION INTRAMUSCULAR; INTRAVENOUS at 09:36

## 2021-07-14 RX ADMIN — LIDOCAINE HYDROCHLORIDE 80 MG: 20 INJECTION, SOLUTION EPIDURAL; INFILTRATION; INTRACAUDAL; PERINEURAL at 09:36

## 2021-07-14 RX ADMIN — ONDANSETRON 4 MG: 2 INJECTION INTRAMUSCULAR; INTRAVENOUS at 16:09

## 2021-07-14 RX ADMIN — Medication 80 MCG: at 10:39

## 2021-07-14 RX ADMIN — CEFOXITIN SODIUM 1 G: 1 POWDER, FOR SOLUTION INTRAVENOUS at 18:34

## 2021-07-14 RX ADMIN — ROCURONIUM BROMIDE 10 MG: 10 SOLUTION INTRAVENOUS at 10:34

## 2021-07-14 RX ADMIN — HYDROMORPHONE HYDROCHLORIDE 1 MG: 1 INJECTION, SOLUTION INTRAMUSCULAR; INTRAVENOUS; SUBCUTANEOUS at 17:14

## 2021-07-14 RX ADMIN — CEFOXITIN SODIUM 2 G: 2 POWDER, FOR SOLUTION INTRAVENOUS at 09:45

## 2021-07-14 RX ADMIN — ACETAMINOPHEN 1000 MG: 500 TABLET ORAL at 13:32

## 2021-07-14 RX ADMIN — LIDOCAINE HYDROCHLORIDE 2 MG/KG/HR: 8 INJECTION, SOLUTION INTRAVENOUS at 09:42

## 2021-07-14 RX ADMIN — SODIUM CHLORIDE, POTASSIUM CHLORIDE, SODIUM LACTATE AND CALCIUM CHLORIDE 125 ML/HR: 600; 310; 30; 20 INJECTION, SOLUTION INTRAVENOUS at 12:39

## 2021-07-14 RX ADMIN — ONDANSETRON 4 MG: 2 INJECTION INTRAMUSCULAR; INTRAVENOUS at 12:24

## 2021-07-14 RX ADMIN — MIDAZOLAM 2 MG: 1 INJECTION INTRAMUSCULAR; INTRAVENOUS at 09:28

## 2021-07-14 RX ADMIN — ROCURONIUM BROMIDE 30 MG: 10 SOLUTION INTRAVENOUS at 09:40

## 2021-07-14 RX ADMIN — SODIUM CHLORIDE, POTASSIUM CHLORIDE, SODIUM LACTATE AND CALCIUM CHLORIDE 500 ML/HR: 600; 310; 30; 20 INJECTION, SOLUTION INTRAVENOUS at 09:08

## 2021-07-14 RX ADMIN — ONDANSETRON HYDROCHLORIDE 4 MG: 2 INJECTION, SOLUTION INTRAMUSCULAR; INTRAVENOUS at 11:19

## 2021-07-14 RX ADMIN — HYDROMORPHONE HYDROCHLORIDE 1 MG: 1 INJECTION, SOLUTION INTRAMUSCULAR; INTRAVENOUS; SUBCUTANEOUS at 21:12

## 2021-07-14 RX ADMIN — FENTANYL CITRATE 25 MCG: 50 INJECTION, SOLUTION INTRAMUSCULAR; INTRAVENOUS at 12:47

## 2021-07-14 RX ADMIN — DEXMEDETOMIDINE HYDROCHLORIDE 4 MCG: 400 INJECTION INTRAVENOUS at 10:46

## 2021-07-14 RX ADMIN — FENTANYL CITRATE 25 MCG: 50 INJECTION, SOLUTION INTRAMUSCULAR; INTRAVENOUS at 09:52

## 2021-07-14 RX ADMIN — NEOSTIGMINE METHYLSULFATE 3 MG: 1 INJECTION, SOLUTION INTRAVENOUS at 11:34

## 2021-07-14 RX ADMIN — HYOSCYAMINE SULFATE 0.12 MG: 0.12 TABLET ORAL; SUBLINGUAL at 12:47

## 2021-07-14 RX ADMIN — HYDROMORPHONE HYDROCHLORIDE 1 MG: 1 INJECTION, SOLUTION INTRAMUSCULAR; INTRAVENOUS; SUBCUTANEOUS at 13:32

## 2021-07-14 RX ADMIN — Medication 80 MCG: at 11:12

## 2021-07-14 RX ADMIN — DEXMEDETOMIDINE HYDROCHLORIDE 4 MCG: 400 INJECTION INTRAVENOUS at 10:21

## 2021-07-14 RX ADMIN — MAGNESIUM SULFATE 2 G: 2 INJECTION INTRAVENOUS at 10:12

## 2021-07-14 RX ADMIN — GABAPENTIN 500 MG: 250 SOLUTION ORAL at 08:55

## 2021-07-14 RX ADMIN — Medication 10 ML: at 14:00

## 2021-07-14 RX ADMIN — ACETAMINOPHEN ORAL SOLUTION 1000 MG: 650 SOLUTION ORAL at 08:55

## 2021-07-14 RX ADMIN — ROCURONIUM BROMIDE 10 MG: 10 SOLUTION INTRAVENOUS at 10:12

## 2021-07-14 RX ADMIN — MEPERIDINE HYDROCHLORIDE 12.5 MG: 25 INJECTION INTRAMUSCULAR; INTRAVENOUS; SUBCUTANEOUS at 12:15

## 2021-07-14 RX ADMIN — DEXMEDETOMIDINE HYDROCHLORIDE 4 MCG: 400 INJECTION INTRAVENOUS at 10:57

## 2021-07-14 RX ADMIN — SUCCINYLCHOLINE CHLORIDE 120 MG: 20 INJECTION, SOLUTION INTRAMUSCULAR; INTRAVENOUS at 09:37

## 2021-07-14 RX ADMIN — DEXMEDETOMIDINE HYDROCHLORIDE 4 MCG: 400 INJECTION INTRAVENOUS at 10:50

## 2021-07-14 RX ADMIN — DEXAMETHASONE SODIUM PHOSPHATE 8 MG: 4 INJECTION, SOLUTION INTRAMUSCULAR; INTRAVENOUS at 09:52

## 2021-07-14 NOTE — ANESTHESIA PREPROCEDURE EVALUATION
Relevant Problems   No relevant active problems       Anesthetic History   No history of anesthetic complications            Review of Systems / Medical History  Patient summary reviewed, nursing notes reviewed and pertinent labs reviewed    Pulmonary  Within defined limits                 Neuro/Psych   Within defined limits      Psychiatric history     Cardiovascular  Within defined limits  Hypertension              Exercise tolerance: >4 METS     GI/Hepatic/Renal  Within defined limits         Liver disease     Endo/Other  Within defined limits    Hypothyroidism  Morbid obesity and arthritis     Other Findings              Physical Exam    Airway  Mallampati: II  TM Distance: 4 - 6 cm  Neck ROM: normal range of motion   Mouth opening: Normal     Cardiovascular  Regular rate and rhythm,  S1 and S2 normal,  no murmur, click, rub, or gallop             Dental  No notable dental hx       Pulmonary  Breath sounds clear to auscultation               Abdominal  GI exam deferred       Other Findings            Anesthetic Plan    ASA: 2  Anesthesia type: general          Induction: Intravenous  Anesthetic plan and risks discussed with: Patient

## 2021-07-14 NOTE — BRIEF OP NOTE
Brief Postoperative Note    Patient: Rita Dyer  YOB: 1975  MRN: [id number]    Date of Procedure: 7/14/2021     Pre-Op Diagnosis:  MORBID OBESITY    Post-Op Diagnosis: Same as preoperative diagnosis. Procedure(s):  LAPAROSCOPIC GASTRIC BYPASS, (EGD), (E R A S)    Surgeon(s):  MD Yesenia Cervantes MD    Surgical Assistant: None    Anesthesia: General     Estimated Blood Loss (mL): 040 mL    Complications: None    Specimens: * No specimens in log *     Implants:   Implant Name Type Inv. Item Serial No.  Lot No. LRB No. Used Action   GORE SEAMGUARD Bioabsorable Staple   NA  [id number] N/A 3 Implanted       Drains:   Ivan-Zavala Drain 07/14/21 Left Abdomen (Active)   Site Assessment Clean, dry, & intact 07/14/21 1153   Dressing Status Clean, dry, & intact 07/14/21 1153   Status Patent;Draining 07/14/21 1153   Drainage Color Serosanguinous 07/14/21 1153       Findings: Creation of a 30 cc gastric gastric pouch with 150 cm AC/AG Jimena; linear stapled GJ; no air leak or hemorrhage on endoscopy.     Electronically Signed by Chinyere Pettit MD on 7/14/2021 at 12:09 PM

## 2021-07-14 NOTE — ANESTHESIA POSTPROCEDURE EVALUATION
Procedure(s):  LAPAROSCOPIC GASTRIC BYPASS, (EGD), (E R A S). general    Anesthesia Post Evaluation        Patient location during evaluation: PACU  Note status: Adequate. Level of consciousness: responsive to verbal stimuli and sleepy but conscious  Pain management: satisfactory to patient  Airway patency: patent  Anesthetic complications: no  Cardiovascular status: acceptable  Respiratory status: acceptable  Hydration status: acceptable  Comments: +Post-Anesthesia Evaluation and Assessment    Patient: Christiano Craig MRN: [id number]  SSN: [id number]   YOB: 1975  Age: 54 y.o. Sex: female          Cardiovascular Function/Vital Signs    BP (!) 158/96   Pulse 87   Temp 36.2 °C (97.2 °F)   Resp 14   Ht 5' 5\" (1.651 m)   Wt 127.1 kg (280 lb 3.2 oz)   SpO2 96%   BMI 46.63 kg/m²     Patient is status post Procedure(s):  LAPAROSCOPIC GASTRIC BYPASS, (EGD), (E R A S). Nausea/Vomiting: Controlled. Postoperative hydration reviewed and adequate. Pain:  Pain Scale 1: Numeric (0 - 10) (\"im in pain\") (07/14/21 1215)  Pain Intensity 1: 0 (07/14/21 1153)   Managed. Neurological Status:   Neuro (WDL): Exceptions to WDL (07/14/21 1215)   At baseline. Mental Status and Level of Consciousness: Arousable. Pulmonary Status:   O2 Device: Nasal cannula (07/14/21 1215)   Adequate oxygenation and airway patent. Complications related to anesthesia: None    Post-anesthesia assessment completed. No concerns. I have evaluated the patient and the patient is stable and ready to be discharged from PACU . Signed By: Dario Skinner MD    7/14/2021        INITIAL Post-op Vital signs:   Vitals Value Taken Time   /85 07/14/21 1245   Temp 36.2 °C (97.2 °F) 07/14/21 1153   Pulse 82 07/14/21 1249   Resp 12 07/14/21 1249   SpO2 97 % 07/14/21 1249   Vitals shown include unvalidated device data.

## 2021-07-14 NOTE — PERIOP NOTES
Patient: Juliana Dwyer MRN: [id number]  SSN: [id number]   YOB: 1975  Age: 54 y.o. Sex: female     Patient is status post Procedure(s):  LAPAROSCOPIC GASTRIC BYPASS, (EGD), (E R A S). Surgeon(s) and Role:     * Apryl Bingham MD - Primary     * Stephania Gregorio MD - Assisting    Local/Dose/Irrigation:  See [facility]                  Peripheral IV 07/14/21 Anterior;Right Hand (Active)          Ivan-Zavala Drain 07/14/21 Left Abdomen (Active)   Site Assessment Clean, dry, & intact 07/14/21 1128   Dressing Status Clean, dry, & intact 07/14/21 1128   Status Patent; Charged;Draining 07/14/21 1128   Drainage Color Serosanguinous 07/14/21 1128                     Dressing/Packing:  Incision 07/14/21 Abdomen-Dressing/Treatment: Surgical glue (07/14/21 1129)    Splint/Cast:  ]    Other:

## 2021-07-14 NOTE — H&P
Bariatric Surgery History and Physical    Subjective: The patient is a 54 y.o. obese female scheduled for laparoscopic gastric bypass. Body mass index is 46.63 kg/m². Celeste Melissa has tried multiple diets in her  lifetime most recently trying our preoperative diet during which she was able to lose small amounts of weight (9 pounds). She denies fever, chills, or cough. Bariatric comorbidities present are   Past Medical History:   Diagnosis Date    ADD (attention deficit disorder)     Anxiety     Anxiety     Carpal tunnel syndrome     left    Chronic pain     COVID-19 ruled out     COVID-19 vaccine series completed     4/5/21,4/28/21    DJD (degenerative joint disease)     Endocrine disease     hypothyroidism    H. PYLORI INFECTION     treated with triple therapy in Fall 2008    Hypertension     Sleep apnea     Unspecified hypothyroidism        Patient Active Problem List    Diagnosis Date Noted    Gout 04/16/2021    Hyperlipidemia 04/16/2021    Steatosis of liver 11/10/2020    Gastroesophageal reflux disease without esophagitis 11/05/2020    CRP elevated 03/28/2017    Morbid obesity ([address] 06/03/2016    OCD (obsessive compulsive disorder) 06/03/2016    Anxiety 06/03/2016    ADD (attention deficit disorder) 06/03/2016    HTN (hypertension) 05/28/2014    Obstructive sleep apnea 05/28/2014    Hip pain, right 05/28/2014    [place] hump 06/22/2010    Hypothyroidism      Past Medical History:   Diagnosis Date    ADD (attention deficit disorder)     Anxiety     Anxiety     Carpal tunnel syndrome     left    Chronic pain     COVID-19 ruled out     COVID-19 vaccine series completed     4/5/21,4/28/21    DJD (degenerative joint disease)     Endocrine disease     hypothyroidism    H.  PYLORI INFECTION     treated with triple therapy in Fall 2008    Hypertension     Sleep apnea     Unspecified hypothyroidism       Past Surgical History:   Procedure Laterality Date    HX GYN      Bartholin cyst removal    HX TONSILLECTOMY  1988    HX TUBAL LIGATION  2005    NV EXCIS BARTHOLIN GLAND/CYST        Social History     Tobacco Use    Smoking status: Never Smoker    Smokeless tobacco: Never Used   Substance Use Topics    Alcohol use: No      Family History   Problem Relation Age of Onset    Hypertension Mother     Hypertension Father     Stroke Father         age 79    Thyroid Disease Sister         hypothyroidism    Arthritis-rheumatoid Sister     Anesth Problems Neg Hx       Prior to Admission medications    Medication Sig Start Date End Date Taking? Authorizing Provider   levothyroxine (SYNTHROID) 200 mcg tablet Take 1 Tablet by mouth Daily (before breakfast). 6/24/21  Yes Keri Sykes MD   spironolactone-hydrochlorothiazide (ALDACTAZIDE) 25-25 mg per tablet Take 1 Tab by mouth daily. Patient taking differently: Take 1 Tablet by mouth daily (after breakfast). 11/10/20  Yes Masoud Weiner MD   ondansetron (ZOFRAN ODT) 4 mg disintegrating tablet Take 1 Tablet by mouth every eight (8) hours as needed for Nausea or Nausea or Vomiting (AFTER SURGERY). Patient not taking: Reported on 6/29/2021 6/22/21   Jermaine White NP   polyethylene glycol [facility]) 17 gram packet Take 1 Packet by mouth daily. Indications: constipation  Patient not taking: Reported on 6/29/2021 6/22/21   Jermaine White NP   omeprazole (PRILOSEC) 40 mg capsule Take 1 Capsule by mouth daily. AFTER SURGERY  Patient not taking: Reported on 6/29/2021 6/22/21   Jermaine White NP   multivitamin (ONE A DAY) tablet Take 1 Tablet by mouth daily. Provider, Historical     Allergies   Allergen Reactions    Darvocet A500 [Propoxyphene N-Acetaminophen] Hives    Sertraline Other (comments)     Intolerance         Review of Systems:    Review of Systems   Constitutional: Positive for weight loss. Negative for chills and fever. HENT: Negative. Eyes: Negative.     Respiratory: Positive for shortness of breath. Negative for cough and wheezing. Cardiovascular: Negative for chest pain, palpitations and leg swelling. Gastrointestinal: Positive for heartburn. Negative for abdominal pain, nausea and vomiting. Genitourinary: Negative. Musculoskeletal: Positive for back pain, joint pain and neck pain. Skin: Negative. Neurological: Negative. Endo/Heme/Allergies: Negative. Psychiatric/Behavioral: Negative. Objective:     Visit Vitals  /88 (BP 1 Location: Right upper arm, BP Patient Position: At rest)   Pulse 95   Temp 98.5 °F (36.9 °C)   Resp 18   Ht 5' 5\" (1.651 m)   Wt 280 lb 3.2 oz (127.1 kg)   LMP 06/26/2021   SpO2 98%   BMI 46.63 kg/m²       Physical Exam:  GENERAL: alert, cooperative, no distress, appears stated age, morbidly obese, EYE: negative, THROAT & NECK: normal, LUNG: clear to auscultation bilaterally, HEART: regular rate and rhythm, S1, S2 normal, no murmur. ABDOMEN: Obese, nondistended, soft. No pain with palpation, mass, or hernia. EXTREMITIES:  extremities normal, atraumatic, no cyanosis or edema, SKIN: Normal., NEUROLOGIC: negative. Assessment:     Morbid obesity with multiple comorbidities; no success with medical management. Plan:     Laparoscopic gastric bypass with hiatal hernia repair and upper endoscopy. Technical aspects of procedure reviewed along with risks (to include but not limited to bleeding, wound infection, VTE, open procedure, leak, ulcer, stricture, persistent reflux symptoms, poor weight loss/weight regain). Also reviewed anticipated hospital course, postoperative diet, and activity restrictions. She understands and desires to proceed. All questions answered.       Signed By: Demetrice Marrero MD     July 14, 2021

## 2021-07-14 NOTE — PROGRESS NOTES
TRANSFER - OUT REPORT:    Verbal report given to Glen Bills RN(name) on Frederick Engel  being transferred to 5E(unit) for routine post - op       Report consisted of patients Situation, Background, Assessment and   Recommendations(SBAR). Time Pre op antibiotic [id number]:[id number]  Anesthesia Stop time: 0026  Lobato Present on Transfer to floor:n/a  Order for Lobato on Chart:n/a  Discharge Prescriptions with Chart:n/a    Information from the following report(s) SBAR, Procedure Summary, Intake/Output and Med Rec Status was reviewed with the receiving nurse. Opportunity for questions and clarification was provided. Is the patient on 02? NO       L/Min        Other     Is the patient on a monitor? NO    Is the nurse transporting with the patient? NO    Surgical Waiting Area notified of patient's transfer from PACU?  YES      The following personal items collected during your admission accompanied patient upon transfer:   Dental Appliance: Dental Appliances: Uppers, Partials, At home  Vision:    Hearing Aid:    Jewelry:    Clothing:    Other Valuables:    Valuables sent to safe:

## 2021-07-15 LAB
ANION GAP SERPL CALC-SCNC: 4 MMOL/L (ref 5–15)
BASOPHILS # BLD: 0 K/UL (ref 0–0.1)
BASOPHILS NFR BLD: 0 % (ref 0–1)
BUN SERPL-MCNC: 9 MG/DL (ref 6–20)
BUN/CREAT SERPL: 11 (ref 12–20)
CALCIUM SERPL-MCNC: 9 MG/DL (ref 8.5–10.1)
CHLORIDE SERPL-SCNC: 107 MMOL/L (ref 97–108)
CO2 SERPL-SCNC: 27 MMOL/L (ref 21–32)
CREAT SERPL-MCNC: 0.83 MG/DL (ref 0.55–1.02)
DIFFERENTIAL METHOD BLD: ABNORMAL
EOSINOPHIL # BLD: 0 K/UL (ref 0–0.4)
EOSINOPHIL NFR BLD: 0 % (ref 0–7)
ERYTHROCYTE [DISTWIDTH] IN BLOOD BY AUTOMATED COUNT: 12.5 % (ref 11.5–14.5)
GLUCOSE SERPL-MCNC: 128 MG/DL (ref 65–100)
HCT VFR BLD AUTO: 43.9 % (ref 35–47)
HGB BLD-MCNC: 14.2 G/DL (ref 11.5–16)
IMM GRANULOCYTES # BLD AUTO: 0.1 K/UL (ref 0–0.04)
IMM GRANULOCYTES NFR BLD AUTO: 1 % (ref 0–0.5)
LYMPHOCYTES # BLD: 1.6 K/UL (ref 0.8–3.5)
LYMPHOCYTES NFR BLD: 9 % (ref 12–49)
MCH RBC QN AUTO: 29.3 PG (ref 26–34)
MCHC RBC AUTO-ENTMCNC: 32.3 G/DL (ref 30–36.5)
MCV RBC AUTO: 90.5 FL (ref 80–99)
MONOCYTES # BLD: 1.4 K/UL (ref 0–1)
MONOCYTES NFR BLD: 8 % (ref 5–13)
NEUTS SEG # BLD: 14.6 K/UL (ref 1.8–8)
NEUTS SEG NFR BLD: 82 % (ref 32–75)
NRBC # BLD: 0 K/UL (ref 0–0.01)
NRBC BLD-RTO: 0 PER 100 WBC
PLATELET # BLD AUTO: 295 K/UL (ref 150–400)
PMV BLD AUTO: 11.6 FL (ref 8.9–12.9)
POTASSIUM SERPL-SCNC: 4.1 MMOL/L (ref 3.5–5.1)
RBC # BLD AUTO: 4.85 M/UL (ref 3.8–5.2)
SODIUM SERPL-SCNC: 138 MMOL/L (ref 136–145)
WBC # BLD AUTO: 17.6 K/UL (ref 3.6–11)

## 2021-07-15 PROCEDURE — 74011250636 HC RX REV CODE- 250/636: Performed by: SURGERY

## 2021-07-15 PROCEDURE — 80048 BASIC METABOLIC PNL TOTAL CA: CPT

## 2021-07-15 PROCEDURE — 85025 COMPLETE CBC W/AUTO DIFF WBC: CPT

## 2021-07-15 PROCEDURE — 65660000000 HC RM CCU STEPDOWN

## 2021-07-15 PROCEDURE — 74011250637 HC RX REV CODE- 250/637: Performed by: SURGERY

## 2021-07-15 PROCEDURE — 36415 COLL VENOUS BLD VENIPUNCTURE: CPT

## 2021-07-15 PROCEDURE — 74011000258 HC RX REV CODE- 258: Performed by: SURGERY

## 2021-07-15 PROCEDURE — 74011250636 HC RX REV CODE- 250/636: Performed by: PHYSICIAN ASSISTANT

## 2021-07-15 RX ORDER — HYDROMORPHONE HYDROCHLORIDE 2 MG/1
2-4 TABLET ORAL
Status: DISCONTINUED | OUTPATIENT
Start: 2021-07-15 | End: 2021-07-16 | Stop reason: HOSPADM

## 2021-07-15 RX ORDER — DIPHENHYDRAMINE HYDROCHLORIDE 50 MG/ML
25 INJECTION, SOLUTION INTRAMUSCULAR; INTRAVENOUS
Status: DISCONTINUED | OUTPATIENT
Start: 2021-07-15 | End: 2021-07-16 | Stop reason: HOSPADM

## 2021-07-15 RX ORDER — KETOROLAC TROMETHAMINE 30 MG/ML
15 INJECTION, SOLUTION INTRAMUSCULAR; INTRAVENOUS EVERY 6 HOURS
Status: DISCONTINUED | OUTPATIENT
Start: 2021-07-15 | End: 2021-07-16 | Stop reason: HOSPADM

## 2021-07-15 RX ADMIN — HYDROMORPHONE HYDROCHLORIDE 4 MG: 2 TABLET ORAL at 09:00

## 2021-07-15 RX ADMIN — GABAPENTIN 200 MG: 100 CAPSULE ORAL at 18:04

## 2021-07-15 RX ADMIN — ONDANSETRON 4 MG: 2 INJECTION INTRAMUSCULAR; INTRAVENOUS at 02:23

## 2021-07-15 RX ADMIN — DIPHENHYDRAMINE HYDROCHLORIDE 25 MG: 50 INJECTION INTRAMUSCULAR; INTRAVENOUS at 13:08

## 2021-07-15 RX ADMIN — ONDANSETRON 4 MG: 2 INJECTION INTRAMUSCULAR; INTRAVENOUS at 09:01

## 2021-07-15 RX ADMIN — ACETAMINOPHEN 1000 MG: 500 TABLET ORAL at 13:08

## 2021-07-15 RX ADMIN — GABAPENTIN 200 MG: 100 CAPSULE ORAL at 09:01

## 2021-07-15 RX ADMIN — KETOROLAC TROMETHAMINE 15 MG: 30 INJECTION, SOLUTION INTRAMUSCULAR; INTRAVENOUS at 18:04

## 2021-07-15 RX ADMIN — Medication 10 ML: at 14:30

## 2021-07-15 RX ADMIN — HYOSCYAMINE SULFATE 0.12 MG: 0.12 TABLET ORAL; SUBLINGUAL at 09:04

## 2021-07-15 RX ADMIN — ACETAMINOPHEN 1000 MG: 500 TABLET ORAL at 02:11

## 2021-07-15 RX ADMIN — HYDROMORPHONE HYDROCHLORIDE 1 MG: 1 INJECTION, SOLUTION INTRAMUSCULAR; INTRAVENOUS; SUBCUTANEOUS at 02:11

## 2021-07-15 RX ADMIN — CEFOXITIN SODIUM 1 G: 1 POWDER, FOR SOLUTION INTRAVENOUS at 02:23

## 2021-07-15 RX ADMIN — KETOROLAC TROMETHAMINE 15 MG: 30 INJECTION, SOLUTION INTRAMUSCULAR; INTRAVENOUS at 13:08

## 2021-07-15 NOTE — DISCHARGE INSTRUCTIONS
[facility]  Bariatric Surgery Discharge Instructions     Procedure Laparoscopic gastric bypass     Future Appointments   Date Time Provider Tammie Sally   7/28/2021 10:00 AM KATIE Judd AMB   8/11/2021  9:00 AM KATIE Judd AMB   8/25/2021 10:00 AM KATIE Judd BS AMB   2/4/2022  8:30 AM MD CONSTANCE Koroma [facility] BS AMB         Contact Information:    [facility], [address], [address]  [phone]    After Hours and Weekends  [phone] On Call Surgeon    Non Emergent Medical Needs  Call during office hours or send a message via My Chart   (messages returned during business hours)    DIET    Please remember that you are on ONLY LIQUIDS for the first 2 weeks after surgery. Do not advance to the next phase until advised by your surgeon or Nurse Practitioner. Refer to the Bariatric Handbook for detailed information. TO PREVENT DEHYDRATION:  consume 48-64 ounces of liquids daily. At least 48 ounces of that should come from water, Crystal Light, sugar free popsicles, sugar free gelatin or other calorie-free, sugar-free, caffeine free and noncarbonated beverages. Do not drink with a straw.  Sip, sip, sip throughout the day   Main priority is to stay hydrated   Aim for 60 grams of protein every day. Most of your protein will come from shakes. Refer to the Bariatric Handbook for detailed information.    Add additional protein supplements to meet protein needs (protein powder, clear protein such as protein water, non-fat dry milk powder, NO protein bars at this at this stage)     MEDICATIONS & VITAMINS     Pre-surgery medications should be reviewed with your Bariatric provider and taken as prescribed    Take no more than 2 pills at a time and wait 15-20 minutes between pills       Pain Medication  The first few days home, you may require narcotic pain medication to manage your pain. Take this medication only as prescribed. If your pain is mild to moderate, try taking Acetaminophen (Tylenol) 500 mg 1-2 tablets every 8 hours or as directed by your provider. Avoid taking antiinflammatory medications (NSAID'S) such as Ibuprofen (Motrin, Advil) or Naproxen (Aleve). These medications can be harmful to your stomach and cause bleeding and ulcers. There is a complete list of NSAID medications to AVOID in your handbook. Abdominal support (Spanx or body shaper) and heat (heating pad on low setting) are very helpful in managing pain after surgery. Acid Reducing (\"heartburn/reflux\") Medication   Acid reducing medicine should have been prescribed at your pre-surgery visit. It is recommended you take this medication every day even if you have no symptoms of reflux or heartburn. If you were previously on a medication for reflux/heartburn you should continue the medication daily. *It is common to experience reflux or heartburn after sleeve gastrectomy. These symptoms can usually be managed with medication, diet and behavior changes. In most cases symptoms improve or resolve after a few weeks to a couple of months. Nausea Medication  You should have been prescribed medication for nausea at your pre-surgery visit. If you are experiencing nausea, please take the medication as prescribed to try and get relief. If the nausea medication is not effective, please call your surgeon's office. Constipation   Constipation can be caused by pain medication and reduced food and water intake. Drink at least 64 oz. fluid. OK to use OTC medications such as Benefiber, Milk of Magnesia, Dulcolax, Miralax, senna       Vitamins   Calcium Citrate with Vitamin D-3 - Take 1200--1500 mg  each day. Divide doses throughout the day. Do not take more than 600 mg at one time.    Take at least 2 hours before or after your multivitamin and/or iron supplement. Multivitamin containing Iron - 2 multivitamins with 100% Daily Value of Iron, Folic Acid and Thiamine   Vitamin D-3 - Take 3000 IU  per day  Vitamin B-12 - Oral or Sublingual: 350-500 mcg/day OR 1000 mcg Monthly intramuscular shot        ACTIVITY     Be active. Sit up as much as possible. Walk often. Walking and/or foot exercises will help prevent blood clots.  Continue to sip liquids throughout the day   Continue to use your incentive spirometer 4 to 5 times per day   Keep your incisions clean and dry to prevent infection.  Showering is ok. No submersion in water for 2 weeks (No tubs, pools, etc.)   Weight lifting restrictions:  10 lbs. for the first 2 weeks, 20 lbs. for the next 4 to 6 weeks    TOP REASONS TO CONTACT YOUR SURGEONS'S OFFICE     You have severe pain or discomfort unrelieved by pain medication.  You have been vomiting for more than 24 hours. Call sooner if you are unable to drink any fluids.  Temperature rises above 101 degrees.  You have persistent nausea and/or vomiting.  You are unable to swallow liquids    Increased swelling, redness, or drainage from your incision sites.

## 2021-07-15 NOTE — CONSULTS
NUTRITION     Chart reviewed. Post-op bariatric diet instruction completed. Will gladly follow up for additional questions as needed. Thank you.      Christian Dale RD

## 2021-07-15 NOTE — PROGRESS NOTES
Progress Note    Patient: Sky Cash MRN: [id number]  SSN: [id number]    YOB: 1975  Age: 54 y.o. Sex: female      Admit Date: 2021    1 Day Post-Op    Procedure:  Procedure(s):  LAPAROSCOPIC GASTRIC BYPASS, (EGD), (E R A S)    Subjective:     Patient complains of incisional pain which has improved since Wednesday PM; she has ambulated in rodas and is performing spirometry; she has started bariatric liquids. Objective:     Visit Vitals  /78   Pulse 61   Temp 99.1 °F (37.3 °C)   Resp 16   Ht 5' 5\" (1.651 m)   Wt 280 lb 3.2 oz (127.1 kg)   SpO2 97%   BMI 46.63 kg/m²       Temp (24hrs), Av.2 °F (36.8 °C), Min:97.3 °F (36.3 °C), Max:99.1 °F (37.3 °C)      Physical Exam:    LUNG: diminished breath sounds R base, L base, HEART: regular rate and rhythm, S1, S2 normal, no murmur. ABDOMEN: Obese, non-distended, soft. Wounds dry & intact. Sero-sanguinous drain fluid. Appropriate incisional pain with palpation.     Data Review: VS, I/O's, Labs    Lab Review:   Recent Results (from the past 24 hour(s))   METABOLIC PANEL, BASIC    Collection Time: 07/15/21  2:20 AM   Result Value Ref Range    Sodium 138 136 - 145 mmol/L    Potassium 4.1 3.5 - 5.1 mmol/L    Chloride 107 97 - 108 mmol/L    CO2 27 21 - 32 mmol/L    Anion gap 4 (L) 5 - 15 mmol/L    Glucose 128 (H) 65 - 100 mg/dL    BUN 9 6 - 20 MG/DL    Creatinine 0.83 0.55 - 1.02 MG/DL    BUN/Creatinine ratio 11 (L) 12 - 20      GFR est AA >60 >60 ml/min/1.73m2    GFR est non-AA >60 >60 ml/min/1.73m2    Calcium 9.0 8.5 - 10.1 MG/DL   CBC WITH AUTOMATED DIFF    Collection Time: 07/15/21  2:20 AM   Result Value Ref Range    WBC 17.6 (H) 3.6 - 11.0 K/uL    RBC 4.85 3.80 - 5.20 M/uL    HGB 14.2 11.5 - 16.0 g/dL    HCT 43.9 35.0 - 47.0 %    MCV 90.5 80.0 - 99.0 FL    MCH 29.3 26.0 - 34.0 PG    MCHC 32.3 30.0 - 36.5 g/dL    RDW 12.5 11.5 - 14.5 %    PLATELET 961 108 - 568 K/uL    MPV 11.6 8.9 - 12.9 FL    NRBC 0.0 0  WBC    ABSOLUTE NRBC 0. 00 0.00 - 0.01 K/uL    NEUTROPHILS 82 (H) 32 - 75 %    LYMPHOCYTES 9 (L) 12 - 49 %    MONOCYTES 8 5 - 13 %    EOSINOPHILS 0 0 - 7 %    BASOPHILS 0 0 - 1 %    IMMATURE GRANULOCYTES 1 (H) 0.0 - 0.5 %    ABS. NEUTROPHILS 14.6 (H) 1.8 - 8.0 K/UL    ABS. LYMPHOCYTES 1.6 0.8 - 3.5 K/UL    ABS. MONOCYTES 1.4 (H) 0.0 - 1.0 K/UL    ABS. EOSINOPHILS 0.0 0.0 - 0.4 K/UL    ABS. BASOPHILS 0.0 0.0 - 0.1 K/UL    ABS. IMM. GRANS. 0.1 (H) 0.00 - 0.04 K/UL    DF AUTOMATED         Assessment:     Hospital Problems  Date Reviewed: 7/14/2021        Codes Class Noted POA    [facility] ICD-10-CM: E66.01  ICD-9-CM: 278.01  6/3/2016 Unknown              Plan/Recommendations/Medical Decision Making:     Continue bariatric liquids-goal 4 oz/hr. Oral analgesics. Mechanical DVT prophylaxis. Ambulation, increase spirometry.

## 2021-07-15 NOTE — PROGRESS NOTES
Bedside shift change report given to Fatmata Sheppard4 (oncoming nurse) by Toshia Rome (offgoing nurse). Report included the following information SBAR, Kardex, Intake/Output, MAR and Recent Results.

## 2021-07-15 NOTE — PROGRESS NOTES
I have read and agree with Cameron Mayo RN documentation. (travel nurse orientation)    Timmy Pickett, RN

## 2021-07-15 NOTE — OP NOTES
[address]   OPERATIVE REPORT    Name:  Brian Hardy  MR#:  [id number]  :  1975  ACCOUNT #:  [de-identified]  DATE OF SERVICE:  2021      PRIMARY PREOPERATIVE DIAGNOSIS:  Morbid obesity. SECONDARY PREOPERATIVE DIAGNOSES:  1.  Obstructive sleep apnea. 2.  Degenerative joint disease. 3.  Hypertension. POSTOPERATIVE DIAGNOSES:  1.  Morbid obesity. 2.  Obstructive sleep apnea. 3.  Degenerative joint disease. 4.  Hypertension. PROCEDURES PERFORMED:  1. Laparoscopic Jimena-en-Y gastric bypass. 2.  Intraoperative upper endoscopy. SURGEON:  Marlena Chiang MD    ASSISTANT:  Evan Gillette MD    ANESTHESIA:  General endotracheal.    COMPLICATIONS:  None. SPECIMENS REMOVED:  None. IMPLANTS:  None. ESTIMATED BLOOD LOSS:  100 mL. DRAIN:  A 19-mm Danny drain. COUNTS:  Sponge count correct. Needle count correct. INDICATIONS:  The patient is a 23-year-old white female with a height of 65 inches, weight of 280 pounds, with resultant body mass index of 46.3 kg/m2 on a medium frame. She has the above-listed obesity-related conditions. All medical efforts at weight loss have been unsuccessful. After extensive preoperative counseling, patient education, medical screening, it was felt she would be a good candidate for weight reduction surgery. She presents to [facility] today for laparoscopic gastric bypass. I have asked Evan Gillette MD, to assist with the procedure given the technical complexity of laparoscopic bariatric surgery. He will run the laparoscope, assist in creation of the jejunojejunostomy, assist in creation of the gastric pouch, and assist in creation of the gastrojejunostomy. FINDINGS:  1. Creation of a 30-mL gastric pouch with 490-RG antecolic, antegastric Jimena limb. 2.  Linear stapled gastrojejunostomy. 3.  No intraluminal hemorrhage or insufflation air leak on upper endoscopy.     PROCEDURE:  The patient was identified as the correct patient in the preoperative holding area and informed consent was confirmed. After answering the patient's remaining questions, she was taken to the operating room and placed on the operating room table in the supine position. Sequential compression devices were placed on both lower extremities. Following the uneventful initiation of general anesthesia, she was carefully secured to the operating room table with footboard and safety strap in place. All potential pressure points were padded with eggcrate. Her abdomen was prepped and draped in the usual sterile fashion. Final time-out was performed, and it was confirmed she had received intravenous antibiotics. A 5-mm trocar was inserted through a small right upper quadrant skin incision using an Optiview technique. After confirming intraperitoneal location of the trocar tip, insufflation with carbon dioxide gas was initiated. Once adequate working sites had been developed, a 5-mm, 30-degree laparoscope was inserted. No signs of trocar injury were present. The liver was noted to be enlarged. A 5-mm epigastric trocar was inserted through a small skin incision using visual guidance with the laparoscope. A left subcostal 5-mm trocar was inserted using identical technique. The right upper quadrant 12-mm trocar was inserted using visual guidance with the laparoscope. The omentum was retracted into the upper abdomen, and the omentum was serially ligated and divided from its free edge to the antimesenteric border of the transverse colon using the Harmonic scalpel. With the transverse colon elevated anteriorly and cephalad, the ligament of Treitz was identified. 50 cm of bowel was measured distal from this landmark. The jejunum was divided at this point using a tan load linear stapler firing. The distal edge of the transected bowel was marked with 0 Surgidac suture. The small-bowel mesentery was mobilized using the Harmonic scalpel.   Once adequate mobility of the alimentary limb had been achieved, an additional 150 cm of bowel was measured distal to this transection site. This segment of bowel was brought alongside the biliopancreatic limb in side-to-side fashion. The Harmonic scalpel was used to make openings in each segment of bowel, and through these openings, a 60-mm tan load linear stapler was carefully inserted into each segment of bowel. After confirming correct insertion and orientation of the stapler, the stapler was closed, fired, and removed. The staple line was noted to be hemostatic. The remaining opening in the bowel was closed with a running 2-0 Polysorb suture. A tension-relieving, anti-obstruction suture was placed at the distal edge of the staple line. The mesenteric defect was closed with a running 0 Surgidac suture. The Jimena limb was delivered into the upper abdomen with care to avoid twisting of bowel or its blood supply. The patient was placed in a steep reverse Trendelenburg position. The [facility] liver retractor was inserted through a small subxiphoid incision. With the left lobe of the liver retracted anteriorly and cephalad, the esophageal hiatus was visualized. No hiatal hernia was observed. The fat pad and angle of His were mobilized from the left brittany of the diaphragm using the Harmonic scalpel and articulating dissector. The pars flaccida portion of the gastrohepatic ligament was incised, allowing atraumatic entry into the lesser sac. A site 4 cm distal to the gastroesophageal junction was chosen. The gastrohepatic ligament was serially ligated and divided at this level using the Harmonic scalpel. A 30-mL gastric pouch was created with multiple purple load linear stapler firings, utilizing Seamguard reinforcement material.  A calibration tube was used to size pouch construction. Following pouch construction, the calibration tube was removed.   The Jimena limb was delivered into the left upper quadrant with care to avoid twisting of the bowel or its blood supply. A running suture was placed between the lateral aspect of the gastric pouch and the antimesenteric border of the Jimena limb with 0 Surgidac suture. The Harmonic scalpel was used to make openings in both the Jimena limb and the gastric pouch, and through these openings, a 30-mm tan load linear stapler was carefully inserted. After confirming correct insertion of the stapler, the stapler was closed, fired, and removed. The staple line was noted to be hemostatic. The remaining opening between these 2 structures was closed with a running 2-0 Polysorb suture, completed over a  Olympus endoscope passed through the anastomosis into the efferent portion of the Jimena limb. This suture line was imbricated with a running 0 Surgidac suture. An intestinal clamp was placed on the Jimena limb distal to the location of the gastroscope, and the patient was returned to the supine position. Sterile saline was instilled into the upper abdomen. Intraoperative upper endoscopy was continued. The gastroscope was withdrawn into the gastric pouch and insufflation was re-initiated. With insufflation, the gastric pouch and Jimena limb were noted to distend without insufflation air leak. No intraluminal hemorrhage was identified. The anastomosis was hemostatic and patent. The bowel was decompressed and the gastroscope was removed. Sterile saline was evacuated from the upper abdomen. The intestinal clamp was removed. Richard's space was closed with a running 0 Surgidac suture. A hitch suture was placed between the antimesenteric border of the Jimena limb and the excluded stomach. A 19-mm Danny drain was inserted into the abdominal space. It was allowed to lie adjacent to the gastrojejunal anastomosis and brought out through the left subcostal 5-mm trocar site. It was secured to the skin with a 2-0 nylon suture.   After confirming adequate hemostasis, the [facility] liver retractor was removed, followed by closure of the 12-mm fascial defect using a 0 Vicryl suture with a laparoscopic suture passer. Pneumoperitoneum was released, and all trocars were removed. All wounds were infiltrated with 0.5% Marcaine without epinephrine. All skin edges were reapproximated with a combination of subcuticular 4-0 Monocryl suture and Dermabond. The patient tolerated the procedure well. She was extubated in the operating room and transported to the recovery area in stable condition. The attending surgeon, Dr. Autumn Dugan, was scrubbed and present for the entire procedure.         Isaias Rosario MD      [id number]  D:  07/15/2021 6:50  T:  07/15/2021 8:50  JOB #:  [id number]

## 2021-07-16 ENCOUNTER — TELEPHONE (OUTPATIENT)
Dept: SURGERY | Age: 46
End: 2021-07-16

## 2021-07-16 VITALS
BODY MASS INDEX: 46.69 KG/M2 | RESPIRATION RATE: 18 BRPM | TEMPERATURE: 97.9 F | WEIGHT: 280.2 LBS | HEIGHT: 65 IN | SYSTOLIC BLOOD PRESSURE: 105 MMHG | OXYGEN SATURATION: 97 % | DIASTOLIC BLOOD PRESSURE: 74 MMHG | HEART RATE: 68 BPM

## 2021-07-16 LAB
ANION GAP SERPL CALC-SCNC: 4 MMOL/L (ref 5–15)
BASOPHILS # BLD: 0 K/UL (ref 0–0.1)
BASOPHILS NFR BLD: 0 % (ref 0–1)
BUN SERPL-MCNC: 14 MG/DL (ref 6–20)
BUN/CREAT SERPL: 16 (ref 12–20)
CALCIUM SERPL-MCNC: 8.6 MG/DL (ref 8.5–10.1)
CHLORIDE SERPL-SCNC: 108 MMOL/L (ref 97–108)
CO2 SERPL-SCNC: 28 MMOL/L (ref 21–32)
CREAT SERPL-MCNC: 0.89 MG/DL (ref 0.55–1.02)
DIFFERENTIAL METHOD BLD: ABNORMAL
EOSINOPHIL # BLD: 0 K/UL (ref 0–0.4)
EOSINOPHIL NFR BLD: 0 % (ref 0–7)
ERYTHROCYTE [DISTWIDTH] IN BLOOD BY AUTOMATED COUNT: 13 % (ref 11.5–14.5)
GLUCOSE SERPL-MCNC: 78 MG/DL (ref 65–100)
HCT VFR BLD AUTO: 38.9 % (ref 35–47)
HGB BLD-MCNC: 12.3 G/DL (ref 11.5–16)
IMM GRANULOCYTES # BLD AUTO: 0 K/UL (ref 0–0.04)
IMM GRANULOCYTES NFR BLD AUTO: 0 % (ref 0–0.5)
LYMPHOCYTES # BLD: 3.6 K/UL (ref 0.8–3.5)
LYMPHOCYTES NFR BLD: 35 % (ref 12–49)
MCH RBC QN AUTO: 29.6 PG (ref 26–34)
MCHC RBC AUTO-ENTMCNC: 31.6 G/DL (ref 30–36.5)
MCV RBC AUTO: 93.7 FL (ref 80–99)
MONOCYTES # BLD: 1.2 K/UL (ref 0–1)
MONOCYTES NFR BLD: 11 % (ref 5–13)
NEUTS SEG # BLD: 5.4 K/UL (ref 1.8–8)
NEUTS SEG NFR BLD: 54 % (ref 32–75)
NRBC # BLD: 0 K/UL (ref 0–0.01)
NRBC BLD-RTO: 0 PER 100 WBC
PLATELET # BLD AUTO: 224 K/UL (ref 150–400)
PMV BLD AUTO: 11.4 FL (ref 8.9–12.9)
POTASSIUM SERPL-SCNC: 4.2 MMOL/L (ref 3.5–5.1)
RBC # BLD AUTO: 4.15 M/UL (ref 3.8–5.2)
SODIUM SERPL-SCNC: 140 MMOL/L (ref 136–145)
WBC # BLD AUTO: 10.2 K/UL (ref 3.6–11)

## 2021-07-16 PROCEDURE — 36415 COLL VENOUS BLD VENIPUNCTURE: CPT

## 2021-07-16 PROCEDURE — 85025 COMPLETE CBC W/AUTO DIFF WBC: CPT

## 2021-07-16 PROCEDURE — 80048 BASIC METABOLIC PNL TOTAL CA: CPT

## 2021-07-16 PROCEDURE — 74011250637 HC RX REV CODE- 250/637: Performed by: SURGERY

## 2021-07-16 PROCEDURE — 74011250636 HC RX REV CODE- 250/636: Performed by: SURGERY

## 2021-07-16 RX ORDER — HYDROMORPHONE HYDROCHLORIDE 2 MG/1
2-4 TABLET ORAL
Qty: 25 TABLET | Refills: 0 | Status: SHIPPED | OUTPATIENT
Start: 2021-07-16 | End: 2021-07-23

## 2021-07-16 RX ORDER — LEVOTHYROXINE SODIUM 100 UG/1
200 TABLET ORAL
Status: DISCONTINUED | OUTPATIENT
Start: 2021-07-17 | End: 2021-07-16 | Stop reason: HOSPADM

## 2021-07-16 RX ADMIN — KETOROLAC TROMETHAMINE 15 MG: 30 INJECTION, SOLUTION INTRAMUSCULAR; INTRAVENOUS at 03:18

## 2021-07-16 RX ADMIN — GABAPENTIN 200 MG: 100 CAPSULE ORAL at 09:52

## 2021-07-16 RX ADMIN — Medication 10 ML: at 03:18

## 2021-07-16 NOTE — PROGRESS NOTES
Bedside shift change report given to [address] [place] [address]  (oncoming nurse) by Bruce Block RN (offgoing nurse). Report included the following information SBAR, Kardex, Intake/Output, MAR and Recent Results.

## 2021-07-16 NOTE — PROGRESS NOTES
Progress Note    Patient: Celeste Melissa MRN: [id number]  SSN: [id number]    YOB: 1975  Age: 54 y.o. Sex: female      Admit Date: 2021    2 Days Post-Op    Procedure:  Procedure(s):  LAPAROSCOPIC GASTRIC BYPASS, (EGD), (E R A S)    Subjective:     Patient notes improvingincisional pain; she is tolerating 4 oz/hr bariatric liquids and ambulating. Objective:     Visit Vitals  /85   Pulse 73   Temp 98.6 °F (37 °C)   Resp 16   Ht 5' 5\" (1.651 m)   Wt 280 lb 3.2 oz (127.1 kg)   SpO2 98%   BMI 46.63 kg/m²       Temp (24hrs), Av.5 °F (36.9 °C), Min:97.4 °F (36.3 °C), Max:99.1 °F (37.3 °C)    Date 07/15/21 0700 - 21 0659 21 - 21 0659   Shift 5621-9320 7760-6820 24 Hour Total 9480-5033 2558-5281 24 Hour Total   INTAKE   P.O. 240  240        P. O. 240  240      Shift Total(mL/kg) 240(1.9)  240(1.9)      OUTPUT   Urine(mL/kg/hr) 350(0.2) 1050(0.7) 1400(0.5)        Urine Voided 350 1050 1400      Drains 40 70 110        Output (ml) (Ivan-Zavala Drain 21 Left Abdomen) 40 70 110      Shift Total(mL/kg) 390(3.1) 1120(8.8) 1510(11.9)      NET -150 -1120 -1270      Weight (kg) 127.1 127.1 127.1 127.1 127.1 127.1       Physical Exam:    ABDOMEN: Obese, non-distended, soft. Wounds dry & intact. Sero-sanguinous drain fluid. Appropriate incisional pain with palpation.     Data Review: VS, I/O's, Labs    Lab Review:   Recent Results (from the past 24 hour(s))   CBC WITH AUTOMATED DIFF    Collection Time: 21  3:17 AM   Result Value Ref Range    WBC 10.2 3.6 - 11.0 K/uL    RBC 4.15 3.80 - 5.20 M/uL    HGB 12.3 11.5 - 16.0 g/dL    HCT 38.9 35.0 - 47.0 %    MCV 93.7 80.0 - 99.0 FL    MCH 29.6 26.0 - 34.0 PG    MCHC 31.6 30.0 - 36.5 g/dL    RDW 13.0 11.5 - 14.5 %    PLATELET 245 068 - 092 K/uL    MPV 11.4 8.9 - 12.9 FL    NRBC 0.0 0  WBC    ABSOLUTE NRBC 0.00 0.00 - 0.01 K/uL    NEUTROPHILS 54 32 - 75 %    LYMPHOCYTES 35 12 - 49 %    MONOCYTES 11 5 - 13 % EOSINOPHILS 0 0 - 7 %    BASOPHILS 0 0 - 1 %    IMMATURE GRANULOCYTES 0 0.0 - 0.5 %    ABS. NEUTROPHILS 5.4 1.8 - 8.0 K/UL    ABS. LYMPHOCYTES 3.6 (H) 0.8 - 3.5 K/UL    ABS. MONOCYTES 1.2 (H) 0.0 - 1.0 K/UL    ABS. EOSINOPHILS 0.0 0.0 - 0.4 K/UL    ABS. BASOPHILS 0.0 0.0 - 0.1 K/UL    ABS. IMM. GRANS. 0.0 0.00 - 0.04 K/UL    DF AUTOMATED     METABOLIC PANEL, BASIC    Collection Time: 07/16/21  3:17 AM   Result Value Ref Range    Sodium 140 136 - 145 mmol/L    Potassium 4.2 3.5 - 5.1 mmol/L    Chloride 108 97 - 108 mmol/L    CO2 28 21 - 32 mmol/L    Anion gap 4 (L) 5 - 15 mmol/L    Glucose 78 65 - 100 mg/dL    BUN 14 6 - 20 MG/DL    Creatinine 0.89 0.55 - 1.02 MG/DL    BUN/Creatinine ratio 16 12 - 20      GFR est AA >60 >60 ml/min/1.73m2    GFR est non-AA >60 >60 ml/min/1.73m2    Calcium 8.6 8.5 - 10.1 MG/DL       Assessment:     Hospital Problems  Date Reviewed: 7/14/2021        Codes Class Noted POA    [facility] ([facility] ICD-10-CM: E66.01  ICD-9-CM: 278.01  6/3/2016 Unknown              Plan/Recommendations/Medical Decision Making:     Remove drain. Discharge to home on bariatric liquids.

## 2021-07-16 NOTE — TELEPHONE ENCOUNTER
I called Zach back form [address] and he said the total daily about of Dilaudid exceds daily recommendations. I told him she has been getting 4 mg of Dilaudid in the hospital, He said he will make note of that and make daily amount 6 pills per day. Zach in agreement.

## 2021-07-16 NOTE — ROUTINE PROCESS
Bedside shift change report given to [id number] (oncoming nurse) by Angélica Bee RN (offgoing nurse). Report included the following information SBAR and Kardex.

## 2021-07-16 NOTE — TELEPHONE ENCOUNTER
[address] off [address] [place] [address]  Office: [phone]    Pharmacy called and stated the medication sent exceeds maximum dosage and asked for a call back to clarify.

## 2021-07-19 ENCOUNTER — TELEPHONE (OUTPATIENT)
Dept: SURGERY | Age: 46
End: 2021-07-19

## 2021-07-19 NOTE — DISCHARGE SUMMARY
Physician Discharge Summary     Patient ID:  Patsy Hansen  [id number]  9 y.o.  1975    Allergies: Darvocet a500 [propoxyphene n-acetaminophen] and Sertraline    Admit Date: 7/14/2021    Discharge Date: 7/16/2021    * Admission Diagnoses: Morbid obesity ([facility] [E66.01]    * Discharge Diagnoses:    Hospital Problems as of 7/16/2021 Date Reviewed: 7/14/2021        Codes Class Noted - Resolved POA    Morbid obesity ([facility] ICD-10-CM: E66.01  ICD-9-CM: 278.01  6/3/2016 - Present Unknown               Admission Condition: Good    * Discharge Condition: good    * Procedures: Procedure(s):  LAPAROSCOPIC GASTRIC BYPASS, (EGD), (E R A S)    * Hospital Course:   Normal hospital course for this procedure. Consults: None    Significant Diagnostic Studies: N/A    * Disposition: Home    Discharge Medications:   Discharge Medication List as of 7/16/2021  8:33 AM      START taking these medications    Details   HYDROmorphone (DILAUDID) 2 mg tablet Take 1-2 Tablets by mouth every six (6) hours as needed for Pain for up to 7 days. Max Daily Amount: 16 mg., Normal, Disp-25 Tablet, R-0         CONTINUE these medications which have NOT CHANGED    Details   levothyroxine (SYNTHROID) 200 mcg tablet Take 1 Tablet by mouth Daily (before breakfast). , Normal, Disp-30 Tablet, R-1Take in addition to 200mcg tablet for total daily dose of 225mcg      ondansetron (ZOFRAN ODT) 4 mg disintegrating tablet Take 1 Tablet by mouth every eight (8) hours as needed for Nausea or Nausea or Vomiting (AFTER SURGERY). , Normal, Disp-20 Tablet, R-0      polyethylene glycol (MIRALAX) 17 gram packet Take 1 Packet by mouth daily. Indications: constipation, Normal, Disp-90 Packet, R-3      omeprazole (PRILOSEC) 40 mg capsule Take 1 Capsule by mouth daily. AFTER SURGERY, Normal, Disp-30 Capsule, R-1      multivitamin (ONE A DAY) tablet Take 1 Tablet by mouth daily. , Historical Med         STOP taking these medications spironolactone-hydrochlorothiazide (ALDACTAZIDE) 25-25 mg per tablet Comments:   Reason for Stopping:               * Follow-up Care/Patient Instructions:   Activity: No heavy lifting, pushing, pulling x 4 weeks  Diet: bariatric liquids  Wound Care: Keep wounds clean and dry    Follow-up Information     Follow up With Specialties Details Why Contact Info    Pieter Morales MD Internal Medicine   [address] [place] [address]  [phone]            Future Appointments   Date Time Provider Tammie Zavala   7/28/2021 10:00 AM KATIE Judd BS AMB   8/11/2021  9:00 AM KATIE Judd BS AMB   8/25/2021 10:00 AM KATIE Judd BS AMB   2/4/2022  8:30 AM Bao Bahena MD [facility] BS AMB         Signed:  Kam Solis MD  7/19/2021  9:21 AM

## 2021-07-19 NOTE — TELEPHONE ENCOUNTER
Bariatric Post-Operative Phone Calls: 48 hour phone call    Diet:Question of any nausea and/or vomiting. Protein intake (goal is 60 grams of protein daily)   Poor____Fair____Good__x__Great____     Comment:__getting in 40- 45 grams____________________________________________________________      ______________________________________________________________________    Hydration:Less than 32 ounces of water daily is fair to poor (Goal is 64 ounces per day)   Poor____ Fair____ Good_x___Great____    Comment:____48 ounces per day__________________________________________________________    ______________________________________________________________________      Ambulation:( walking at least 3 x week, for 15- 20 minutes)     Poor______ Fair______ Good__x____     Great______ Comment:__________________________________________________    ______________________________________________________________________      Urine Color: Question of any odor and color(should be chivo, pale, and clear) Dark______ Amber______ Pale___x___      Clear______ Comment:___________________________________________________                           ________________________________________________________________    Bowel movements: Question of any constipation- haven't had any bowel movements for more than 3 days. This could be related to protein intake and/or narcotic pain medication usage. Comment:                                                        Moving her bowels                                                                      Pain: Left sided abdominal pain is normal (should be less than 3)  Question if pain medication is helpful.  10___ 9___ 8___ 7___ 6___ 5___ 4__x_ 3___     2___1___0___Comment:__with movement_______________________________________________    ______________________________________________________________________      Incision: (No redness, pain, swelling or fever) Healing Well___x___ Healed______Redness_________ Pain_________     Swelling_________ Fever__________(greater than 101 needs evaluation)    Comment:____________________________________________________________    ______________________________________________________________________  Use of incentive spirometer: Yes_x 1500___       No           Next Appointment:___7/28/21___________                 Support Group: Yes______No______    Additional Comments:____________________________________________________________    ____________________________________________________________________      If more than one parameter is not met or considered poor, nurse needs to discuss with provider recommend for patient to be seen in the office as soon as possible or refer to the provider for follow-up. Reinforce to patient to use bariatric educational booklet as guide. It is appropriate to refer patient to the nutritionist to discuss more in detail of diet and nutrition.

## 2021-07-19 NOTE — TELEPHONE ENCOUNTER
----- Message from Crow Coates LPN sent at  10:45 AM EDT -----  Regardin day post op call  Lap gastric bypass discharged on 21  ----- Message -----  From: Tammy Sweeney  Sent: 2021   8:27 AM EDT  To: Crow Coates LPN  Subject: Discharge phonen call                            Good morning,  Please call Ms. Soares for her discharge phone call. Thank you! Mely

## 2021-07-20 ENCOUNTER — HOSPITAL ENCOUNTER (EMERGENCY)
Age: 46
Discharge: HOME | End: 2021-07-20
Attending: EMERGENCY MEDICINE | Admitting: EMERGENCY MEDICINE
Payer: MEDICAID

## 2021-07-20 ENCOUNTER — APPOINTMENT (OUTPATIENT)
Dept: VASCULAR SURGERY | Age: 46
End: 2021-07-20
Attending: EMERGENCY MEDICINE
Payer: MEDICAID

## 2021-07-20 VITALS
OXYGEN SATURATION: 97 % | HEART RATE: 84 BPM | TEMPERATURE: 97.9 F | DIASTOLIC BLOOD PRESSURE: 87 MMHG | RESPIRATION RATE: 14 BRPM | BODY MASS INDEX: 45.88 KG/M2 | HEIGHT: 65 IN | WEIGHT: 275.35 LBS | SYSTOLIC BLOOD PRESSURE: 136 MMHG

## 2021-07-20 DIAGNOSIS — M79.661 PAIN OF RIGHT CALF: Primary | ICD-10-CM

## 2021-07-20 PROCEDURE — 93971 EXTREMITY STUDY: CPT

## 2021-07-20 PROCEDURE — 99282 EMERGENCY DEPT VISIT SF MDM: CPT

## 2021-07-21 NOTE — ED TRIAGE NOTES
Pt arrives ambulatory to ED w/ CC R calf pain. Pt states it came on suddenly around 10am. Reports the pain is worse when she is sitting. Laproscapic bypass on 7/14. Does not take blood thinners. Called Dr. Gwen Eric office today and they told to to come to ED to rule out DVT.

## 2021-07-21 NOTE — ED PROVIDER NOTES
Please note that this dictation was completed with [employer], the computer voice recognition software.  Quite often unanticipated grammatical, syntax, homophones, and other interpretive errors are inadvertently transcribed by the computer software.  Please disregard these errors.  Please excuse any errors that have escaped final proofreading. Patient is a 50-year-old old female with history of hypertension, sleep apnea, chronic pain, presenting to emergency department for evaluation of right calf pain with onset this morning. She had a laparoscopic gastric bypass done on July 14 by Dr. Glen Barber, states that she spoke with her office today and was referred to the ED for evaluation of blood clot. She denies history of DVT or PE. Denies chest pain, shortness of breath, or any other medical complaints at this time. She states that she has been fairly active since her surgery. Past Medical History:   Diagnosis Date    ADD (attention deficit disorder)     Anxiety     Anxiety     Carpal tunnel syndrome     left    Chronic pain     COVID-19 ruled out     COVID-19 vaccine series completed     4/5/21,4/28/21    DJD (degenerative joint disease)     Endocrine disease     hypothyroidism    H.  PYLORI INFECTION     treated with triple therapy in Fall 2008    Hypertension     Sleep apnea     Unspecified hypothyroidism        Past Surgical History:   Procedure Laterality Date    HX GYN      Bartholin cyst removal    HX TONSILLECTOMY  1988    HX TUBAL LIGATION  2005    AK EXCIS BARTHOLIN GLAND/CYST           Family History:   Problem Relation Age of Onset    Hypertension Mother     Hypertension Father     Stroke Father         age 79    Thyroid Disease Sister         hypothyroidism    Arthritis-rheumatoid Sister     Anesth Problems Neg Hx        Social History     Socioeconomic History    Marital status: SINGLE     Spouse name: Not on file    Number of children: 11    Years of education: Not on file    Highest education level: Not on file   Occupational History    Occupation: [address] -    Tobacco Use    Smoking status: Never Smoker    Smokeless tobacco: Never Used   Vaping Use    Vaping Use: Never used   Substance and Sexual Activity    Alcohol use: No    Drug use: No    Sexual activity: Never   Other Topics Concern     Service Not Asked    Blood Transfusions Not Asked    Caffeine Concern Not Asked    Occupational Exposure Not Asked    Hobby Hazards Not Asked    Sleep Concern Not Asked    Stress Concern Not Asked    Weight Concern Not Asked    Special Diet Not Asked    Back Care Not Asked    Exercise Yes     Comment: Walking 4 miles/day to/from work.  Bike Helmet Not Asked   [address] Not Asked    Self-Exams Not Asked   Social History Narrative    Living with 25 yo daughter and 2 grandchildren in [place]. No pets in the house. 21, 25, 15, 5 yo children - 2 youngest children live in [facility] with pt's mother. Social Determinants of Health     Financial Resource Strain:     Difficulty of Paying Living Expenses:    Food Insecurity:     Worried About Running Out of Food in the Last Year:     [address] in the Last Year:    Transportation Needs:     Lack of Transportation (Medical):  Lack of Transportation (Non-Medical):    Physical Activity:     Days of Exercise per Week:     Minutes of Exercise per Session:    Stress:     Feeling of Stress :    Social Connections:     Frequency of Communication with Friends and Family:     Frequency of Social Gatherings with Friends and Family:     Attends [employer] Services:     Active Member of Clubs or Organizations:     Attends Club or Organization Meetings:     Marital Status:    Intimate Partner Violence:     Fear of Current or Ex-Partner:     Emotionally Abused:     Physically Abused:     Sexually Abused:           ALLERGIES: Darvocet a500 [propoxyphene n-acetaminophen] and Sertraline    Review of Systems   Constitutional: Negative for chills and fever. HENT: Negative for ear pain and sore throat. Eyes: Negative for visual disturbance. Respiratory: Negative for cough and shortness of breath. Cardiovascular: Negative for chest pain. Gastrointestinal: Negative for abdominal pain. Genitourinary: Negative for flank pain. Musculoskeletal: Positive for myalgias. Negative for back pain. Skin: Negative for color change. Neurological: Negative for dizziness and headaches. Psychiatric/Behavioral: Negative for confusion. Vitals:    07/20/21 2120 07/20/21 2122   BP: 136/87    Pulse: 84    Resp: 14    Temp: 97.9 °F (36.6 °C)    SpO2: 97% 97%   Weight: 124.9 kg (275 lb 5.7 oz)    Height: 5' 5\" (1.651 m)             Physical Exam  Vitals and nursing note reviewed. Constitutional:       General: She is not in acute distress. Appearance: Normal appearance. She is not ill-appearing. HENT:      Head: Normocephalic and atraumatic. Mouth/Throat:      Pharynx: Oropharynx is clear. Eyes:      Extraocular Movements: Extraocular movements intact. Conjunctiva/sclera: Conjunctivae normal.   Cardiovascular:      Rate and Rhythm: Normal rate and regular rhythm. Pulmonary:      Effort: Pulmonary effort is normal.      Breath sounds: Normal breath sounds. Abdominal:      Palpations: Abdomen is soft. Tenderness: There is no abdominal tenderness. Musculoskeletal:         General: Normal range of motion. Cervical back: No rigidity. Right knee: Normal.      Left knee: Normal.      Right lower leg: Tenderness (posterior calf tenderness, no edema or erythema) present. No swelling or bony tenderness. Left lower leg: Normal. No swelling. Right ankle: Normal.      Left ankle: Normal.   Skin:     General: Skin is warm and dry. Neurological:      General: No focal deficit present.       Mental Status: She is alert and oriented to person, place, and time.   Psychiatric:         Mood and Affect: Mood normal.          MDM  Number of Diagnoses or Management Options  Pain of right calf  Diagnosis management comments: Patient is 6 days postop from a laparoscopic gastric bypass, presents for right-sided calf pain. No hx of DVT or PE. She does have tenderness on exam, no unilateral swelling or erythema. No chest pain or shortness of breath. Doppler ultrasound negative for acute DVT. Amount and/or Complexity of Data Reviewed  Tests in the radiology section of CPT®: reviewed      10:20 PM  Pt has been reevaluated. There are no new complaints, changes, or physical findings at this time. Medications have been reviewed w/ pt and/or family. Pt and/or family's questions have been answered. Pt and/or family expressed good understanding of the dx/tx/rx and is in agreement with plan of care. Pt instructed and agreed to f/u w/ PCP and surgery and to return to ED upon further deterioration. Pt is ready for discharge. IMPRESSION:  1. Pain of right calf        PLAN:  1. Current Discharge Medication List        2.    Follow-up Information     Follow up With Specialties Details Why Contact Info    Myriam Mendez MD Internal Medicine Schedule an appointment as soon as possible for a visit   [address] [facility] [phone]      Apryl Bingham MD General Surgery, [facility], Breast Surgery Go to  As scheduled for routine postoperative follow-up [address] [facility] [address] [place] [address]  [phone]              Return to ED if worse          Procedures

## 2021-07-28 ENCOUNTER — OFFICE VISIT (OUTPATIENT)
Dept: SURGERY | Age: 46
End: 2021-07-28
Payer: MEDICAID

## 2021-07-28 VITALS
DIASTOLIC BLOOD PRESSURE: 84 MMHG | TEMPERATURE: 98.4 F | OXYGEN SATURATION: 97 % | HEART RATE: 76 BPM | SYSTOLIC BLOOD PRESSURE: 133 MMHG | WEIGHT: 267.2 LBS | BODY MASS INDEX: 44.52 KG/M2 | HEIGHT: 65 IN | RESPIRATION RATE: 18 BRPM

## 2021-07-28 DIAGNOSIS — Z09 SURGICAL FOLLOWUP: ICD-10-CM

## 2021-07-28 DIAGNOSIS — E66.01 MORBID OBESITY (HCC): Primary | ICD-10-CM

## 2021-07-28 PROCEDURE — 99024 POSTOP FOLLOW-UP VISIT: CPT | Performed by: NURSE PRACTITIONER

## 2021-07-28 RX ORDER — CYCLOBENZAPRINE HCL 10 MG
10 TABLET ORAL
Qty: 10 TABLET | Refills: 0 | Status: SHIPPED | OUTPATIENT
Start: 2021-07-28 | End: 2021-11-02 | Stop reason: SDUPTHER

## 2021-07-28 NOTE — PROGRESS NOTES
1. Have you been to the ER, urgent care clinic since your last visit? Hospitalized since your last visit? Yes, [facility] 7/20/21 for right calf pain. Doppler negative for DVT    2. Have you seen or consulted any other health care providers outside of the [address] [place] [address] since your last visit? Include any pap smears or colon screening. No    States pain to abdomen started yesterday. States constant pain left of umbilicus. Sharp shooting pain right of umbilicus. Has been wearing abdominal binder. Also complains of sharp radiating pain to left shoulder near clavicle. Patient reports pain to right calf has resolved.

## 2021-07-28 NOTE — PROGRESS NOTES
2  weeks status post gastric bypass. Pt reports doing well on liquids    Patient complains of right lower incision discomfort and left lower incision discomfort. She has had twinges of shoulder pain on the left occassionally since yesterday. She takes care of her 4 grandchildren and feels that she may have done too much which is causing discomfort. She is wearing an abdominal binder to help. Pt reports no nausea and no vomiting  Sheis drinking approximately 48 oz of water daily  + BM, taking colace. She is drinking and eating 20? grams of protein daily. She states that she does not like the taste of the protein shakes. They taste too sweet. She has tried unflavored protein powder. She is taking bariatric vitamins without issue. Total weight loss since surgery 28lbs  Weight loss since last visit 28lbs  Visit Vitals  /84   Pulse 76   Temp 98.4 °F (36.9 °C) (Oral)   Resp 18   Ht 5' 5\" (1.651 m)   Wt 267 lb 3.2 oz (121.2 kg)   SpO2 97%   BMI 44.46 kg/m²          Ms. Soares has a reminder for a \"due or due soon\" health maintenance. I have asked that she contact her primary care provider for follow-up on this health maintenance. Physical Examination: General appearance - alert, well appearing, and in no distress,  Chest - clear to auscultation bilaterally  Heart - normal rate, regular rhythm, normal S1, S2, no murmurs, rubs, clicks or gallops  Abdomen - soft, tender at right and left incision. , nondistended  scars from previous incisions healing without erythema or induration    A/P    Doing well 2 weeks status post laparoscopic Gastric Bypass  Diet advanced to soft foods   Focus on 50-60 grams of protein daily. Encouraged water intake to 64 oz of non-carbonated/no calorie beverages daily. Supplement with unflavored protein powder daily. We discussed wrinkling and protein powder to each of the foods that she is trying. She may use Premier protein for coffee creamer.   We will start Flexeril for right and left side incision pain. Advised patient to limit stooping bending pulling or lifting. She should continue wearing abdominal binder to help with discomfort. Advised to notify us if for some reason discomfort gets worse. Continue PPI  No lifting greater than 10-20 lbs. Follow up in 2 weeks. Ptverbalized understanding and questions were answered to the best of my knowledge and ability. diet educational materials were provided.       Becca Santiago NP

## 2021-07-28 NOTE — PATIENT INSTRUCTIONS
Constipation: Care Instructions  Your Care Instructions     Constipation means that you have a hard time passing stools (bowel movements). People pass stools from 3 times a day to once every 3 days. What is normal for you may be different. Constipation may occur with pain in the rectum and cramping. The pain may get worse when you try to pass stools. Sometimes there are small amounts of bright red blood on toilet paper or the surface of stools. This is because of enlarged veins near the rectum (hemorrhoids). A few changes in your diet and lifestyle may help you avoid ongoing constipation. Your doctor may also prescribe medicine to help loosen your stool. Some medicines can cause constipation. These include pain medicines and antidepressants. Tell your doctor about all the medicines you take. Your doctor may want to make a medicine change to ease your symptoms. Follow-up care is a key part of your treatment and safety. Be sure to make and go to all appointments, and call your doctor if you are having problems. It's also a good idea to know your test results and keep a list of the medicines you take. How can you care for yourself at home? · Drink plenty of fluids. If you have kidney, heart, or liver disease and have to limit fluids, talk with your doctor before you increase the amount of fluids you drink. · Include high-fiber foods in your diet each day. These include fruits, vegetables, beans, and whole grains. · Get at least 30 minutes of exercise on most days of the week. Walking is a good choice. You also may want to do other activities, such as running, swimming, cycling, or playing tennis or team sports. · Take a fiber supplement, such as Citrucel or Metamucil, every day. Read and follow all instructions on the label. · Schedule time each day for a bowel movement. A daily routine may help. Take your time having your bowel movement.   · Support your feet with a small step stool when you sit on the toilet. This helps flex your hips and places your pelvis in a squatting position. · Your doctor may recommend an over-the-counter laxative to relieve your constipation. Examples are Milk of Magnesia and MiraLax. Read and follow all instructions on the label. Do not use laxatives on a long-term basis. When should you call for help? Call your doctor now or seek immediate medical care if:    · You have new or worse belly pain.     · You have new or worse nausea or vomiting.     · You have blood in your stools. Watch closely for changes in your health, and be sure to contact your doctor if:    · Your constipation is getting worse.     · You do not get better as expected. Where can you learn more? Go to http://www.neri.com/  Enter P343 in the search box to learn more about \"Constipation: Care Instructions. \"  Current as of: February 26, 2020               Content Version: 12.8  © 0707-2966 [employer]. Care instructions adapted under license by [employer] (which disclaims liability or warranty for this information). If you have questions about a medical condition or this instruction, always ask your healthcare professional. [place] [address] any warranty or liability for your use of this information. What you need to know:  1. Advance your diet to soft foods. Follow the handout that you were given today in the office. 2.  Take the recommended vitamins daily  3. No lifting greater than 20 lbs. 4.  You can do light jogging and walking. 5  Follow up in 2 weeks. 6.  You may go into a pool. 7.  If you are not able to tolerate liquids or soft foods. Please call our office. [phone]  8. If you have vomiting and persistent epigastric pain or chest pain. You should call our office, the doctor on-call or go to the emergency room.       Constipation  Benefiber, Miralax & similar (once or twice daily)  Milk of Magnesia (daily as needed)  Dulcolax suppository  Fleets Enema    Soft and Mushy   What is this diet?  Introduces soft, easy to digest foods   Low fat, no sugar added    When do I begin?  Once instructed by your surgeon or NP. Usually 2 -3 weeks after surgery       You will stay on this diet until instructed to start the next phase. What foods can I eat?  Moist, mushy foods (see approved list of foods)       Key Points   Continue to drink 48-64 ounces of low calorie, non-carbonated, sugar free beverages between meals.  Eat 3 meals per day   Measure each meal to ?cup per meal   Aim for 60 grams of protein every day. Try food sources of protein first.    Continue to supplement with protein shakes/powder to meet protein goals.  Take small bites. Try eating with smaller utensils (baby spoon, cocktail fork).  Chew food thoroughly   Allow about 30 minutes to eat a meal.  Eating too fast may cause nausea or vomiting.  Stop eating as soon as you feel full. Overeating may stretch your stomach's capacity and prevent desired weight loss.  Do not drink liquids during meals and 30 minutes after meals. Drinking with meals may cause nausea or vomiting.  Add one new food at a time   Take vitamins daily                     Shopping Lists    Soft and Mushy  In addition to everything on the Bariatric Liquid diet, you may add these foods to your diet.   Protein - include with every meal   Egg or egg substitute     Low fat or fat-free cottage cheese     Low fat or fat-free yogurt    Low fat Greek yogurt    Fat-free, 1% milk, or Lactaid milk    Low-fat or vegetarian refried beans    Well-cooked beans and lentils   Fat-free or 2% reduced-fat cheese    Hummus    Low fat soup     Snacks/Other Options:   Whole wheat crackers   Sugar free fudgsicles    Sugar free cocoa    No sugar added pudding         Fruits and Vegetables   Applesauce (no sugar added)   Canned fruit (no sugar added)   Fresh soft peeled fruits (melons, banana, avocado, berries)   Any soft cooked vegetables    Mashed potatoes, Sweet potatoes, baked potatoes (no skin)  Condiments   Fat free non-stick spray   Herbs and spices   Lite butter, margarine, canola oil, olive oil   Reduced-fat or fat-free ariza   Reduced-fat or fat-free salad dressing   Reduced-fat or fat-free cream cheese   Reduced-fat or fat-free sour cream   Lemon juice   Salt, pepper, mustard, ketchup, salsa    Prepare food to the appropriate texture. Sample Meal Plan:  Soft and Mushy    Breakfast ½ cup plain oatmeal with protein powder. Add cinnamon, nutmeg, Splenda brown sugar as desired for flavor 20-25 grams protein   Snack (optional) High protein gelatin (recipe on www.unjury. com) 10 grams protein   Lunch ½ cup low fat cottage cheese or [place] yogurt with soft fruit 10 - 15 grams protein    Snack (optional) High protein pudding or high protein popsicle (recipe on www.unjury. com) 10 grams protein   Dinner ¼ cup low-fat well cooked beans with low-fat cheese sprinkled on top  ¼ cup no sugar added applesauce (can sprinkle protein powder) 5-8 grams protein  510  grams protein

## 2021-08-02 ENCOUNTER — OFFICE VISIT (OUTPATIENT)
Dept: INTERNAL MEDICINE CLINIC | Age: 46
End: 2021-08-02
Payer: MEDICAID

## 2021-08-02 VITALS
WEIGHT: 267 LBS | BODY MASS INDEX: 44.48 KG/M2 | RESPIRATION RATE: 16 BRPM | SYSTOLIC BLOOD PRESSURE: 106 MMHG | DIASTOLIC BLOOD PRESSURE: 71 MMHG | HEART RATE: 77 BPM | HEIGHT: 65 IN | TEMPERATURE: 98 F | OXYGEN SATURATION: 96 %

## 2021-08-02 DIAGNOSIS — R21 RASH: ICD-10-CM

## 2021-08-02 DIAGNOSIS — Z12.11 COLON CANCER SCREENING: ICD-10-CM

## 2021-08-02 DIAGNOSIS — Z09 SURGICAL FOLLOW-UP CARE: Primary | ICD-10-CM

## 2021-08-02 DIAGNOSIS — Z11.59 ENCOUNTER FOR HEPATITIS C SCREENING TEST FOR LOW RISK PATIENT: ICD-10-CM

## 2021-08-02 DIAGNOSIS — I10 BENIGN ESSENTIAL HYPERTENSION: ICD-10-CM

## 2021-08-02 PROCEDURE — 99214 OFFICE O/P EST MOD 30 MIN: CPT | Performed by: FAMILY MEDICINE

## 2021-08-02 RX ORDER — TRIAMCINOLONE ACETONIDE 1 MG/G
OINTMENT TOPICAL 2 TIMES DAILY
Qty: 30 G | Refills: 0 | Status: SHIPPED | OUTPATIENT
Start: 2021-08-02 | End: 2022-02-18 | Stop reason: SDUPTHER

## 2021-08-02 NOTE — PROGRESS NOTES
SPORTS MEDICINE AND PRIMARY CARE  Tyrone Hernandez. MD Josy  [address] [address]    Chief Complaint   Patient presents with    Surgical Follow-up     Patient is here for a folow up.  Rash     Patient states she has a rash on upper left side of stomach        SUBJECTIVE:    Juliana Dwyer is a 54 y.o. female follow-up evaluation. Patient had a gastric bypass procedure with Dr. Kenya Alarcon in July. Patient has visited surgeon  postoperatively. She feels very well. Antihypertensive medication has been discontinued. Pt was evaluated in the ED on 7/20/2021 for right calf pain. Right lower extremity venous Doppler was negative. Patient has a rash over abdomen x2 days. The rash is itchy. Lotion burns. Patient suspects adhesive tape reaction. Endocrinology recently decreased Synthroid dose from 220 mcg to 200 mcg. Care gap review:  Patient had Covid vaccine x2  Pap smear is up-to-date at an outside practice  No history of colorectal cancer screening; no personal or family history of polyp syndromes or colorectal cancer; patient has no change in bowel movements or stool blood. Hepatitis C screening pending    Current Outpatient Medications   Medication Sig Dispense Refill    triamcinolone acetonide (KENALOG) 0.1 % ointment Apply  to affected area two (2) times a day. use thin layer 30 g 0    cyclobenzaprine (FLEXERIL) 10 mg tablet Take 1 Tablet by mouth three (3) times daily as needed for Muscle Spasm(s). 10 Tablet 0    levothyroxine (SYNTHROID) 200 mcg tablet Take 1 Tablet by mouth Daily (before breakfast). 90 Tablet 3    omeprazole (PRILOSEC) 40 mg capsule Take 1 Capsule by mouth daily. AFTER SURGERY 30 Capsule 1    multivitamin (ONE A DAY) tablet Take 1 Tablet by mouth daily.        Past Medical History:   Diagnosis Date    ADD (attention deficit disorder)     Anxiety     Anxiety     Carpal tunnel syndrome     left    Chronic pain     COVID-19 ruled out     COVID-19 vaccine series completed     4/5/21,4/28/21    DJD (degenerative joint disease)     Endocrine disease     hypothyroidism    H. PYLORI INFECTION     treated with triple therapy in Fall 2008    Hypertension     Sleep apnea     Unspecified hypothyroidism      Past Surgical History:   Procedure Laterality Date    HX GASTRIC BYPASS  07/2021    HX GYN      Bartholin cyst removal    HX LAP GASTRIC BYPASS  07/14/2021    by Dr. Nanette Brantley at [address] [place] [address]    HX TUBAL LIGATION  2005    IN EXCIS BARTHOLIN GLAND/CYST       Allergies   Allergen Reactions    Darvocet A500 [Propoxyphene N-Acetaminophen] Hives    Sertraline Other (comments)     Intolerance       REVIEW OF SYSTEMS:  Per hpi    Social History     Socioeconomic History    Marital status: SINGLE     Spouse name: Not on file    Number of children: 11    Years of education: Not on file    Highest education level: Not on file   Occupational History    Occupation: [employer] -    Tobacco Use    Smoking status: Never Smoker    Smokeless tobacco: Never Used   Vaping Use    Vaping Use: Never used   Substance and Sexual Activity    Alcohol use: No    Drug use: No    Sexual activity: Never   Other Topics Concern    Exercise Yes     Comment: Walking 4 miles/day to/from work. Social History Narrative    Living with 25 yo daughter and 2 grandchildren in [facility] [place]. No pets in the house. 21, 25, 15, 5 yo children - 2 youngest children live in [facility] with pt's mother. Social Determinants of Health     Financial Resource Strain:     Difficulty of Paying Living Expenses:    Food Insecurity:     Worried About Running Out of Food in the Last Year:     [address] in the Last Year:    Transportation Needs:     Lack of Transportation (Medical):      Lack of Transportation (Non-Medical):    Physical Activity:     Days of Exercise per Week:     Minutes of Exercise per Session:    Stress:     Feeling of Stress :    Social Connections:     Frequency of Communication with Friends and Family:     Frequency of Social Gatherings with Friends and Family:     Attends Faith Services:     Active Member of Clubs or Organizations:     Attends Club or Organization Meetings:     Marital Status:      Family History   Problem Relation Age of Onset    Hypertension Mother     Hypertension Father     Stroke Father         age 79    Thyroid Disease Sister         hypothyroidism    Arthritis-rheumatoid Sister     Anesth Problems Neg Hx        OBJECTIVE:     Visit Vitals  /71   Pulse 77   Temp 98 °F (36.7 °C)   Resp 16   Ht 5' 5\" (1.651 m)   Wt 267 lb (121.1 kg)   SpO2 96%   BMI 44.43 kg/m²     CONSTITUTIONAL: obese, well developed, NAD  EYES: eom intact  NECK: supple. Thyroid normal  RESPIRATORY: Chest: clear bilaterally  CARDIOVASCULAR: Heart: regular rate and rhythm pulse 1+  GASTROINTESTINAL: Abdomen: soft, bowel sounds active  HEMATOLOGIC: no pathological lymph nodes palpated  MUSCULOSKELETAL: Extremities: no edema,   INTEGUMENT: Warm and dry .   Red prickly appearing rash over left upper abdomen   NEUROLOGIC: non-focal exam   MENTAL STATUS: alert ,appropriate affect     Admission on 07/14/2021, Discharged on 07/16/2021   Component Date Value Ref Range Status    Pregnancy test,urine (POC) 07/14/2021 Negative  NEG   Final    Sodium 07/15/2021 138  136 - 145 mmol/L Final    Potassium 07/15/2021 4.1  3.5 - 5.1 mmol/L Final    Chloride 07/15/2021 107  97 - 108 mmol/L Final    CO2 07/15/2021 27  21 - 32 mmol/L Final    Anion gap 07/15/2021 4* 5 - 15 mmol/L Final    Glucose 07/15/2021 128* 65 - 100 mg/dL Final    BUN 07/15/2021 9  6 - 20 MG/DL Final    Creatinine 07/15/2021 0.83  0.55 - 1.02 MG/DL Final    BUN/Creatinine ratio 07/15/2021 11* 12 - 20   Final    GFR est AA 07/15/2021 >60  >60 ml/min/1.73m2 Final    GFR est non-AA 07/15/2021 >60  >60 ml/min/1.73m2 Final    Estimated GFR is calculated using the IDMS-traceable Modification of Diet in Renal Disease (MDRD) Study equation, reported for both  Americans (GFRAA) and non- Americans (GFRNA), and normalized to 1.73m2 body surface area. The physician must decide which value applies to the patient.  Calcium 07/15/2021 9.0  8.5 - 10.1 MG/DL Final    WBC 07/15/2021 17.6* 3.6 - 11.0 K/uL Final    RBC 07/15/2021 4.85  3.80 - 5.20 M/uL Final    HGB 07/15/2021 14.2  11.5 - 16.0 g/dL Final    HCT 07/15/2021 43.9  35.0 - 47.0 % Final    MCV 07/15/2021 90.5  80.0 - 99.0 FL Final    MCH 07/15/2021 29.3  26.0 - 34.0 PG Final    MCHC 07/15/2021 32.3  30.0 - 36.5 g/dL Final    RDW 07/15/2021 12.5  11.5 - 14.5 % Final    PLATELET 26/16/0792 751  150 - 400 K/uL Final    MPV 07/15/2021 11.6  8.9 - 12.9 FL Final    NRBC 07/15/2021 0.0  0  WBC Final    ABSOLUTE NRBC 07/15/2021 0.00  0.00 - 0.01 K/uL Final    NEUTROPHILS 07/15/2021 82* 32 - 75 % Final    LYMPHOCYTES 07/15/2021 9* 12 - 49 % Final    MONOCYTES 07/15/2021 8  5 - 13 % Final    EOSINOPHILS 07/15/2021 0  0 - 7 % Final    BASOPHILS 07/15/2021 0  0 - 1 % Final    IMMATURE GRANULOCYTES 07/15/2021 1* 0.0 - 0.5 % Final    ABS. NEUTROPHILS 07/15/2021 14.6* 1.8 - 8.0 K/UL Final    ABS. LYMPHOCYTES 07/15/2021 1.6  0.8 - 3.5 K/UL Final    ABS. MONOCYTES 07/15/2021 1.4* 0.0 - 1.0 K/UL Final    ABS. EOSINOPHILS 07/15/2021 0.0  0.0 - 0.4 K/UL Final    ABS. BASOPHILS 07/15/2021 0.0  0.0 - 0.1 K/UL Final    ABS. IMM.  GRANS. 07/15/2021 0.1* 0.00 - 0.04 K/UL Final    DF 07/15/2021 AUTOMATED    Final    WBC 07/16/2021 10.2  3.6 - 11.0 K/uL Final    RBC 07/16/2021 4.15  3.80 - 5.20 M/uL Final    HGB 07/16/2021 12.3  11.5 - 16.0 g/dL Final    HCT 07/16/2021 38.9  35.0 - 47.0 % Final    MCV 07/16/2021 93.7  80.0 - 99.0 FL Final    MCH 07/16/2021 29.6  26.0 - 34.0 PG Final    MCHC 07/16/2021 31.6  30.0 - 36.5 g/dL Final    RDW 07/16/2021 13.0  11.5 - 14.5 % Final    PLATELET 07/16/2021 224  150 - 400 K/uL Final    MPV 07/16/2021 11.4  8.9 - 12.9 FL Final    NRBC 07/16/2021 0.0  0  WBC Final    ABSOLUTE NRBC 07/16/2021 0.00  0.00 - 0.01 K/uL Final    NEUTROPHILS 07/16/2021 54  32 - 75 % Final    LYMPHOCYTES 07/16/2021 35  12 - 49 % Final    MONOCYTES 07/16/2021 11  5 - 13 % Final    EOSINOPHILS 07/16/2021 0  0 - 7 % Final    BASOPHILS 07/16/2021 0  0 - 1 % Final    IMMATURE GRANULOCYTES 07/16/2021 0  0.0 - 0.5 % Final    ABS. NEUTROPHILS 07/16/2021 5.4  1.8 - 8.0 K/UL Final    ABS. LYMPHOCYTES 07/16/2021 3.6* 0.8 - 3.5 K/UL Final    ABS. MONOCYTES 07/16/2021 1.2* 0.0 - 1.0 K/UL Final    ABS. EOSINOPHILS 07/16/2021 0.0  0.0 - 0.4 K/UL Final    ABS. BASOPHILS 07/16/2021 0.0  0.0 - 0.1 K/UL Final    ABS. IMM. GRANS. 07/16/2021 0.0  0.00 - 0.04 K/UL Final    DF 07/16/2021 AUTOMATED    Final    Sodium 07/16/2021 140  136 - 145 mmol/L Final    Potassium 07/16/2021 4.2  3.5 - 5.1 mmol/L Final    Chloride 07/16/2021 108  97 - 108 mmol/L Final    CO2 07/16/2021 28  21 - 32 mmol/L Final    Anion gap 07/16/2021 4* 5 - 15 mmol/L Final    Glucose 07/16/2021 78  65 - 100 mg/dL Final    BUN 07/16/2021 14  6 - 20 MG/DL Final    Creatinine 07/16/2021 0.89  0.55 - 1.02 MG/DL Final    BUN/Creatinine ratio 07/16/2021 16  12 - 20   Final    GFR est AA 07/16/2021 >60  >60 ml/min/1.73m2 Final    GFR est non-AA 07/16/2021 >60  >60 ml/min/1.73m2 Final    Calcium 07/16/2021 8.6  8.5 - 10.1 MG/DL Final   Orders Only on 07/08/2021   Component Date Value Ref Range Status    TSH 07/08/2021 0.65  0.36 - 3.74 uIU/mL Final    Comment:   Due to TSH heterogeneity, both structurally and degree of glycosylation,  monoclonal antibodies used in the TSH assay may not accurately quantitate TSH.   Therefore, this result should be correlated with clinical findings as well as  with other assessments of thyroid function, e.g., free T4, free T3.      T4, Free 07/08/2021 1.8* 0.8 - 1.5 NG/DL Final Hospital Outpatient Visit on 06/21/2021   Component Date Value Ref Range Status    WBC 06/21/2021 7.8  3.6 - 11.0 K/uL Final    RBC 06/21/2021 4.67  3.80 - 5.20 M/uL Final    HGB 06/21/2021 13.8  11.5 - 16.0 g/dL Final    HCT 06/21/2021 43.3  35.0 - 47.0 % Final    MCV 06/21/2021 92.7  80.0 - 99.0 FL Final    MCH 06/21/2021 29.6  26.0 - 34.0 PG Final    MCHC 06/21/2021 31.9  30.0 - 36.5 g/dL Final    RDW 06/21/2021 12.8  11.5 - 14.5 % Final    PLATELET 39/38/1544 276  150 - 400 K/uL Final    MPV 06/21/2021 11.5  8.9 - 12.9 FL Final    NRBC 06/21/2021 0.0  0  WBC Final    ABSOLUTE NRBC 06/21/2021 0.00  0.00 - 0.01 K/uL Final    NEUTROPHILS 06/21/2021 52  32 - 75 % Final    LYMPHOCYTES 06/21/2021 35  12 - 49 % Final    MONOCYTES 06/21/2021 10  5 - 13 % Final    EOSINOPHILS 06/21/2021 2  0 - 7 % Final    BASOPHILS 06/21/2021 1  0 - 1 % Final    IMMATURE GRANULOCYTES 06/21/2021 0  0.0 - 0.5 % Final    ABS. NEUTROPHILS 06/21/2021 4.1  1.8 - 8.0 K/UL Final    ABS. LYMPHOCYTES 06/21/2021 2.8  0.8 - 3.5 K/UL Final    ABS. MONOCYTES 06/21/2021 0.8  0.0 - 1.0 K/UL Final    ABS. EOSINOPHILS 06/21/2021 0.1  0.0 - 0.4 K/UL Final    ABS. BASOPHILS 06/21/2021 0.0  0.0 - 0.1 K/UL Final    ABS. IMM.  GRANS. 06/21/2021 0.0  0.00 - 0.04 K/UL Final    DF 06/21/2021 AUTOMATED    Final    Cholesterol, total 06/21/2021 173  <200 MG/DL Final    Ventricular Rate 06/21/2021 71  BPM Final    Atrial Rate 06/21/2021 71  BPM Final    P-R Interval 06/21/2021 160  ms Final    QRS Duration 06/21/2021 82  ms Final    Q-T Interval 06/21/2021 422  ms Final    QTC Calculation (Bezet) 06/21/2021 458  ms Final    Calculated R Axis 06/21/2021 -21  degrees Final    Calculated T Axis 06/21/2021 -7  degrees Final    Diagnosis 06/21/2021    Final                    Value:Ectopic atrial rhythm  Low voltage QRS  Nonspecific T wave abnormality  When compared with ECG of 04-FEB-2011 20:48,  Ectopic atrial rhythm has replaced Sinus rhythm  Confirmed by Kingston Alfaro MD, Arturo Begum ([id number]) on 6/21/2021 5:29:38 PM      Magnesium 06/21/2021 2.1  1.6 - 2.4 mg/dL Final    Sodium 06/21/2021 140  136 - 145 mmol/L Final    Potassium 06/21/2021 4.6  3.5 - 5.1 mmol/L Final    Chloride 06/21/2021 107  97 - 108 mmol/L Final    CO2 06/21/2021 29  21 - 32 mmol/L Final    Anion gap 06/21/2021 4* 5 - 15 mmol/L Final    Glucose 06/21/2021 119* 65 - 100 mg/dL Final    BUN 06/21/2021 12  6 - 20 MG/DL Final    Creatinine 06/21/2021 0.85  0.55 - 1.02 MG/DL Final    BUN/Creatinine ratio 06/21/2021 14  12 - 20   Final    GFR est AA 06/21/2021 >60  >60 ml/min/1.73m2 Final    GFR est non-AA 06/21/2021 >60  >60 ml/min/1.73m2 Final    Estimated GFR is calculated using the IDMS-traceable Modification of Diet in Renal Disease (MDRD) Study equation, reported for both  Americans (GFRAA) and non- Americans (GFRNA), and normalized to 1.73m2 body surface area. The physician must decide which value applies to the patient.  Calcium 06/21/2021 8.5  8.5 - 10.1 MG/DL Final    Bilirubin, total 06/21/2021 0.3  0.2 - 1.0 MG/DL Final    ALT (SGPT) 06/21/2021 20  12 - 78 U/L Final    AST (SGOT) 06/21/2021 15  15 - 37 U/L Final    Alk. phosphatase 06/21/2021 56  45 - 117 U/L Final    Protein, total 06/21/2021 7.3  6.4 - 8.2 g/dL Final    Albumin 06/21/2021 3.6  3.5 - 5.0 g/dL Final    Globulin 06/21/2021 3.7  2.0 - 4.0 g/dL Final    A-G Ratio 06/21/2021 1.0* 1.1 - 2.2   Final    T4, Free 06/21/2021 0.7* 0.8 - 1.5 NG/DL Final    TSH 06/21/2021 15.20* 0.36 - 3.74 uIU/mL Final    Comment:      Due to TSH heterogeneity, both structurally and degree of glycosylation, monoclonal antibodies used in the TSH assay may not accurately quantitate TSH.  Therefore, this result should be correlated with clinical findings as well as with other assessments of thyroid function, e.g., free T4, free T3.      Color 06/21/2021 YELLOW/STRAW    Final    Color Reference Range: Straw, Yellow or Dark Yellow    Appearance 06/21/2021 CLEAR  CLEAR   Final    Specific gravity 06/21/2021 1.024  1.003 - 1.030   Final    pH (UA) 06/21/2021 5.5  5.0 - 8.0   Final    Protein 06/21/2021 Negative  NEG mg/dL Final    Glucose 06/21/2021 Negative  NEG mg/dL Final    Ketone 06/21/2021 Negative  NEG mg/dL Final    Bilirubin 06/21/2021 Negative  NEG   Final    Blood 06/21/2021 Negative  NEG   Final    Urobilinogen 06/21/2021 0.2  0.2 - 1.0 EU/dL Final    Nitrites 06/21/2021 Negative  NEG   Final    Leukocyte Esterase 06/21/2021 Negative  NEG   Final    UA:UC IF INDICATED 06/21/2021 CULTURE NOT INDICATED BY UA RESULT    Final    WBC 06/21/2021 0-4  0 - 4 /hpf Final    RBC 06/21/2021 0-5  0 - 5 /hpf Final    Epithelial cells 06/21/2021 FEW  FEW /lpf Final    Epithelial cell category consists of squamous cells and /or transitional urothelial cells. Renal tubular cells, if present, are separately identified as such.  Bacteria 06/21/2021 Negative  NEG /hpf Final    Hyaline cast 06/21/2021 0-2  0 - 5 /lpf Final       ASSESSMENT:   1. Surgical follow-up care -stable   2. Rash    3. Colon cancer screening    4. Encounter for hepatitis C screening test for low risk patient    5. HTN stable off medication  6. Hypothyroidism, tolerating lower levoxyl dose following med                    adjustment for elevated FT4      PLAN:  .  Orders Placed This Encounter    COLOGUARD TEST (FECAL DNA COLORECTAL CANCER SCREENING)    HEPATITIS C AB    triamcinolone acetonide (KENALOG) 0.1 % ointment bid for up to 2 wks        Follow up sleep apnea treatment next visit    Follow-up and Dispositions    · Return in about 3 months (around 11/2/2021). I have discussed the diagnosis with the patient and the intended plan as seen in the  orders above. The patient understands and agrees with the plan. The patient has   received an after visit summary.  Questions were answered concerning  future plans  Patient labs and/or xrays were reviewed as available. Past records were reviewed as available. Counseled regarding  healthy lifestyle          Advised patient to proceed to urgent care, call back or return to office if symptoms develop/worsen/change/persist.  Discussed expected course/resolution/complications of diagnosis in detail with patient. Patricia Saeed M.D. This note was created using voice recognition software.   Edits have been made but syntax errors might exist.

## 2021-08-02 NOTE — PATIENT INSTRUCTIONS
Colon Cancer Screening: Care Instructions  Your Care Instructions     Colorectal cancer occurs in the colon or rectum. That's the lower part of your digestive system. It is the second-leading cause of cancer deaths in the [place]. It often starts with small growths called polyps in the colon or rectum. Polyps are usually found with screening tests. Depending on the type of test, any polyps found may be removed during the tests. Colorectal cancer usually does not cause symptoms at first. But regular tests can help find it early, before it spreads and becomes harder to treat. Your risk for colorectal cancer gets higher as you get older. Some experts say that adults should start regular screening at age 48 and stop at age 76. Others say to start before age 48 or continue after age 76. Talk with your doctor about your risk and when to start and stop screening. You may have one of several tests. Follow-up care is a key part of your treatment and safety. Be sure to make and go to all appointments, and call your doctor if you are having problems. It's also a good idea to know your test results and keep a list of the medicines you take. What are the main screening tests for colon cancer? The screening tests are:  Stool tests. These include the guaiac fecal occult blood test (gFOBT), the fecal immunochemical test (FIT), and the combined fecal immunochemical test and stool DNA test (FIT-DNA). These tests check stool samples for signs of cancer. If your test is positive, you will need to have a colonoscopy. Sigmoidoscopy. This test lets your doctor look at the lining of your rectum and the lowest part of your colon. Your doctor uses a lighted tube called a sigmoidoscope. This test can't find cancers or polyps in the upper part of your colon. In some cases, polyps that are found can be removed.  But if your doctor finds polyps, you will need to have a colonoscopy to check the upper part of your colon.  Colonoscopy. This test lets your doctor look at the lining of your rectum and your entire colon. The doctor uses a thin, flexible tool called a colonoscope. It can also be used to remove polyps or get a tissue sample (biopsy). A less common test is CT colonography (CTC). It's also called virtual colonoscopy. Who should be screened for colorectal cancer? Your risk for colorectal cancer gets higher as you get older. Some experts say that adults should start regular screening at age 48 and stop at age 76. Others say to start before age 48 or continue after age 76. Talk with your doctor about your risk and when to start and stop screening. How often you need screening depends on the type of test you get:  Stool tests. Every 1 or 2 years for FIT or gFOBT. Every 3 years for sDNA, also called FIT-DNA. Tests that look inside the colon. Every 5 or 10 years for sigmoidoscopy. Every 5 years for CT colonography (virtual colonoscopy). Every 10 years for colonoscopy. Experts agree that people at higher risk may need to be tested sooner. This includes people who have a strong family history of colon cancer. Talk to your doctor about which test is best for you and when to be tested. When should you call for help? Watch closely for changes in your health, and be sure to contact your doctor if:    · You have any changes in your bowel habits.     · You have any problems. Where can you learn more? Go to http://www.gray.com/  Enter M541 in the search box to learn more about \"Colon Cancer Screening: Care Instructions. \"  Current as of: December 17, 2020               Content Version: 12.8  © 2006-2021 [employer]. Care instructions adapted under license by [employer] (which disclaims liability or warranty for this information).  If you have questions about a medical condition or this instruction, always ask your healthcare professional. [employer] disclaims any warranty or liability for your use of this information. Triamcinolone (By injection)   Triamcinolone (trye-am-SIN-oh-lone)  Treats many diseases and conditions, especially problems related to inflammation. May reduce inflammation in joints. This medicine is a corticosteroid. Brand Name(s): Active Injection Kit KL-3, Active Injection Kit KM, Arze-Ject-A, BT Injection Kit, Bupivilog Kit, DermacinRx Cinlone-I CPI, Interarticular Joint Kit, JTT Physicians Kit, Kenalog-10, Kenalog-40, LT Injection Kit, Lidolog Kit, MLK F1 Kit, MLK F2 Kit, MLK F3 Kit   There may be other brand names for this medicine. When This Medicine Should Not Be Used: You should not receive this medicine if you have had an allergic reaction to triamcinolone. You should not receive this medicine if you have fungal infections or a condition called idiopathic thrombocytopenic purpura. This medicine should not be given to premature babies. How to Use This Medicine:   Injectable  · Your doctor will prescribe your exact dose and tell you how often it should be given. This medicine is given as a shot into one of your muscles, a joint, or a spot on your skin called a lesion. · A nurse or other health provider will give you this medicine. · Carefully follow your doctor's instructions about any special diet. If a dose is missed:   · You must use this medicine on a fixed schedule. Call your doctor or pharmacist if you miss a dose. Drugs and Foods to Avoid:   Ask your doctor or pharmacist before using any other medicine, including over-the-counter medicines, vitamins, and herbal products. · There are many other drugs that can interact with triamcinolone. Make sure your doctor knows about all other medicines you are using.   · Make sure your doctor knows if you are using aminoglutethimide (Cytadren®), cholestyramine Cleavon Mayorga), cyclosporine (Gengraf®, Neoral®, Sandimmune®), digoxin (Lanoxin®), isoniazid (Nydrazid®), ketoconazole (Alverta Peacemaker), pancuronium (Pavulon®), phenobarbital (Luminal®), phenytoin (Dilantin®), or rifampin (Rifadin®). Tell your doctor if you are using birth control pills, pain or arthritis medicine called NSAIDs (such as aspirin, diclofenac, ibuprofen, naproxen, Advil®, Aleve®, Celebrex®, Ecotrin®, Motrin®, or Voltaren®), or a blood thinner (such as warfarin, Coumadin®). · Make sure your doctor knows if you are using medicine to treat an infection (such as amphotericin B, clarithromycin, erythromycin, troleandomycin, Biaxin®, Sherwin-tab®, or Zithromax®), a diuretic or \"water pill\" (such as furosemide, hydrochlorothiazide [HCTZ], or Lasix®), or diabetes medicine (such as insulin, glyburide, metformin, Actos®, Avandia®, Glucotrol®,or Glucovance®). · This medicine may interfere with vaccines. Ask your doctor before you get a flu shot or any other vaccines. Warnings While Using This Medicine:   · Make sure your doctor knows if you are pregnant or breastfeeding, or if you have recently spent time in a tropical climate. · Make sure your doctor knows if you have kidney disease, liver disease, diabetes, tuberculosis, stomach or bowel problems, cataracts, glaucoma, or herpes simplex infection in your eyes. Tell your doctor if you have a mental condition, bone problems (such as osteoporosis), myasthenia gravis, or a thyroid disorder. · Make sure your doctor knows if you have heart disease, congestive heart failure, high blood pressure, or a recent heart attack. Tell your doctor if you have certain infections (such as amoebiasis or candidiasis), viral infections, cerebral malaria, or threadworm infestation. · This medicine may cause a serious type of allergic reaction called anaphylaxis. Anaphylaxis can be life-threatening and requires immediate medical attention. You will be observed for signs and symptoms of anaphylaxis after you receive this medicine.  Tell your doctor right away about any unusual effects you may have.  · Using too much of this medicine or using it for a long time may increase your risk of having adrenal gland problems. The risk is greater for children and for patients who use large amounts for a long time. Talk to your doctor if you have more than one of these symptoms while you are using this medicine: blurred vision; dizziness or fainting; a fast, pounding, or uneven heartbeat; increased thirst or urination; irritability; or unusual tiredness or weakness. · Let your doctor know if you have any events causing unusual stress or anxiety in your life. Your dose of this medicine may need to be changed. · It may be easier for you to get an infection while you are receiving triamcinolone. Avoid crowded places or being near people who are sick. If you are exposed to chicken pox or measles, tell your doctor right away. · This medicine contains benzyl alcohol that may cause problems (such as low blood pressure and metabolic acidosis) when given too much especially to  babies. · Tell any doctor or dentist who treats you that you are using this medicine. This medicine may affect certain medical test results. · Do not stop using this medicine suddenly. Your doctor will need to slowly decrease your dose before you stop it completely. · Your doctor will check your progress and the effects of this medicine at regular visits. Keep all appointments. Possible Side Effects While Using This Medicine:   Call your doctor right away if you notice any of these side effects:  · Allergic reaction: Itching or hives, swelling in your face or hands, swelling or tingling in your mouth or throat, chest tightness, trouble breathing  · Blurred vision or changes in vision. · Bloody or black, tarry stools. · Change in how much or how often you urinate. · Chest pain or discomfort. · Dry mouth, increased thirst, muscle cramps, nausea, or vomiting. · Fast, slow, pounding, or uneven heartbeat.   · Fever, chills, cough, sore throat, and body aches. · Muscle weakness or cramps, or sudden joint pain. · Numbness or weakness in your arm or leg, or on one side of your body. · Seizures. · Severe headache or pain behind your eyes. · Shortness of breath, cold sweat, or bluish-colored skin. · Slowed growth in children. · Stopping of heart, no blood pressure or pulse, or unconsciousness. · Swelling in your hands, ankles, or feet. · Unusual bleeding, bruising, or weakness. · Vomiting of blood or material that looks like coffee grounds. If you notice these less serious side effects, talk with your doctor:   · Blemishes on the skin or pimples. · Changes in your menstrual periods. · Diarrhea. · Feeling sad or depressed. · Gaining weight around your neck, upper back, breast, face, or waist.  · Mild skin rash. · Mood swings, unusual thoughts or behavior. · Restlessness, anxiety, or increased appetite. · Swelling of abdominal or stomach area, full or bloated feeling, or pressure in the stomach. · Thinning skin, changes in skin color, and increased hair growth. If you notice other side effects that you think are caused by this medicine, tell your doctor. Call your doctor for medical advice about side effects. You may report side effects to FDA at 2-148-FDA-5595  © 2017 [facility] Information is for End User's use only and may not be sold, redistributed or otherwise used for commercial purposes. The above information is an  only. It is not intended as medical advice for individual conditions or treatments. Talk to your doctor, nurse or pharmacist before following any medical regimen to see if it is safe and effective for you. Triamcinolone (On the skin)   Triamcinolone (trye-am-SIN-oh-lone)  Treats skin itching, swelling, and other discomfort. This medicine is a corticosteroid.    Brand Name(s): DermaSilkRx SDS Mikhail, DermacinRx SilaPak, DermacinRx Silazone PharmaPak, Dermasorb TA Complete Kit, Dermazone, MARTTILA, Kenalog, NuTriaRx, Bharathi, [place], Triderm, Whyt[employer]erm TDPak, Whytederm Trilasil Mikhail   There may be other brand names for this medicine. When This Medicine Should Not Be Used: You should not use this medicine if you have had an allergic reaction to triamcinolone. How to Use This Medicine:   Cream, Lotion, Ointment, Spray  · Your doctor will tell you how much medicine to use. Do not use more than directed. · Use this medicine only on your skin. Rinse it off right away if it gets on a cut or scrape. Do not get the medicine in your eyes, nose, or mouth. · If you or your child are using the spray form on or near the face, protect your nose to avoid breathing it in and make sure that your eyes are covered. · Do not use this medicine on the face, neck, groin, or underarms unless directed to do so by your doctor. · Wash your hands with soap and water before and after you use this medicine. · Apply a thin layer of the medicine to the affected area. Rub it in gently. · Do not cover the treated area with a bandage unless directed by your doctor. · If the medicine is applied to the diaper area of an infant, do not use tight-fitting diapers or plastic pants unless directed to do so by your doctor. · The spray form is flammable until it dries on the skin. Do not use it near heat, an open flame, or while smoking. Do not puncture, break, or burn the aerosol can. · Read and follow the patient instructions that come with this medicine. Talk to your doctor or pharmacist if you have any questions. If a dose is missed:   · Apply a dose as soon as you can. If it is almost time for your next dose, wait until then and apply a regular dose. Do not apply extra medicine to make up for a missed dose. How to Store and Dispose of This Medicine:   · Store the medicine in a closed container at room temperature, away from heat, moisture, and direct light.   · Ask your pharmacist or doctor how to dispose of the medicine container and any leftover or  medicine. · Keep all medicine out of the reach of children. Never share your medicine with anyone. Drugs and Foods to Avoid:   Ask your doctor or pharmacist before using any other medicine, including over-the-counter medicines, vitamins, and herbal products. · Do not put cosmetics or skin care products on the treated skin. · Do not use this medication with other corticosteroid (eg, hydrocortisone) containing products without checking with your doctor first.  Warnings While Using This Medicine:   · Make sure your doctor knows if you are pregnant or breastfeeding, or if you have diabetes, glaucoma, increased pressure in the head, skin infection or problems, or an adrenal problem called Cushing's syndrome. · Using too much of this medicine or using it for a long time may increase your risk of having adrenal gland problems. The risk is greater for children and patients who use large amounts for a long time. Talk to your doctor right away if you or your child have more than one of these symptoms while you are using this medicine: blurred vision; dizziness or fainting; a fast, uneven, or pounding heartbeat; increased thirst or urination; irritability; or unusual tiredness or weakness. · Stop using this medicine and check with your doctor right away if you or your child have a skin rash, burning, stinging, swelling, or irritation on the skin. · You should not use this medicine for your child without a doctor's approval.  · Do not use this medicine to treat a skin problem your doctor has not examined. · Call your doctor if your symptoms do not improve or if they get worse. · Your doctor will check your progress and the effects of this medicine at regular visits. Keep all appointments. Blood and urine tests may be needed to check for unwanted effects.   Possible Side Effects While Using This Medicine:   Call your doctor right away if you notice any of these side effects:  · Allergic reaction: Itching or hives, swelling in your face or hands, swelling or tingling in your mouth or throat, chest tightness, trouble breathing  · Itching, flaking, or dryness of the treated skin area. · Severe burning, pain, redness, swelling, or irritation of the treated skin areas. · Symptoms of skin infection such as redness, swelling, drainage, or pus. If you notice these less serious side effects, talk with your doctor:   · Acne or tiny pimples on the skin. · Changes in the color of the treated skin. · Excessive hair growth. · Itching and redness around your lips. · Mild burning, dryness, irritation, redness, or itching. · Mild, temporary stinging. · Raised spots on the skin. · Thinning of the skin or bruising. If you notice other side effects that you think are caused by this medicine, tell your doctor. Call your doctor for medical advice about side effects. You may report side effects to FDA at 1-760-MPG-9629  © 2017 [facility] Information is for End User's use only and may not be sold, redistributed or otherwise used for commercial purposes. The above information is an  only. It is not intended as medical advice for individual conditions or treatments. Talk to your doctor, nurse or pharmacist before following any medical regimen to see if it is safe and effective for you.

## 2021-08-02 NOTE — PROGRESS NOTES
Chief Complaint   Patient presents with    Surgical Follow-up     Patient is here for a folow up.  Rash     Patient states she has a rash on upper left side of stomach      1. Have you been to the ER, urgent care clinic since your last visit? Hospitalized since your last visit? No    2. Have you seen or consulted any other health care providers outside of the [address] [place] [address] since your last visit? Include any pap smears or colon screening.  No

## 2021-08-06 ENCOUNTER — TELEPHONE (OUTPATIENT)
Dept: SURGERY | Age: 46
End: 2021-08-06

## 2021-08-06 NOTE — TELEPHONE ENCOUNTER
----- Message from Adrianna Miguel LPN sent at 0/21/4704 10:46 AM EDT -----  Regarding: 3 week post op call  Lap gastric bypass discharged on 7/16/21  ----- Message -----  From: Natalie Thornton  Sent: 7/19/2021   8:27 AM EDT  To: Adrianna Miguel LPN  Subject: Discharge phonen call                            Good morning,  Please call Ms. Soares for her discharge phone call. Thank you! Mely

## 2021-08-06 NOTE — TELEPHONE ENCOUNTER
Bariatric Post-Operative Phone Calls: Week 3    Diet:Question of any nausea and/or vomiting. Question of tolerance to diet advancement from liquids to solids. Protein intake (goal is 60 grams of protein daily)   Poor____Fair____Good__x__Great____     Comment:______________________________________________________________      ______________________________________________________________________    Hydration:Less than 32 ounces of water daily is fair to poor (Goal is 64 ounces per day)   Poor____ Fair____ Good__x__Great____    Comment:___getting in 64 ounces___________________________________________________________    ______________________________________________________________________      Ambulation:( walking at least 3 x week, for at least 30 minutes)   Poor______ Fair______ Good__x____     Great______ Comment:__________________________________________________    ______________________________________________________________________      Urine Color: Question of any odor and color(should be chivo, pale, and clear) Dark______ Amber______ Pale___x___      Clear______ Comment:___________________________________________________                           ________________________________________________________________    Bowel movements: Question of any constipation- haven't had any bowel movements for more than 3 days. This could be related to protein intake and/or narcotic pain medication usage. Comment:                                                        Moving her bowels, using colace                                                                      Pain: Left sided abdominal pain is normal (should be less than 3)         Question if pain medication is helpful.  10___ 9___ 8___ 7___ 6___ 5___ 4___ 3___     2___1___0_x__Comment:_________________________________________________    ______________________________________________________________________      Incision: (No redness, pain, swelling or fever) Healing Well____x__     Healed______Redness_________ Pain_________     Swelling_________ Fever__________(greater than 101 needs evaluation)    Comment:____________________________________________________________    ______________________________________________________________________  Use of incentive spirometer: Yes__x__       No           Next Appointment:__8/11/21____________                 Support Group: Yes______No______    Additional Comments:____________________________________________________________    ____________________________________________________________________      If more than one parameter is not met or considered poor, nurse needs to discuss with provider recommend for patient to be seen in the office as soon as possible or refer to the provider for follow-up. Reinforce to patient to use bariatric educational booklet as guide. It is appropriate to refer patient to the nutritionist to discuss more in detail of diet and nutrition.

## 2021-08-11 ENCOUNTER — VIRTUAL VISIT (OUTPATIENT)
Dept: SURGERY | Age: 46
End: 2021-08-11
Payer: MEDICAID

## 2021-08-11 VITALS — HEIGHT: 65 IN | BODY MASS INDEX: 44.15 KG/M2 | WEIGHT: 265 LBS

## 2021-08-11 DIAGNOSIS — Z09 SURGICAL FOLLOWUP: ICD-10-CM

## 2021-08-11 DIAGNOSIS — E66.01 MORBID OBESITY (HCC): Primary | ICD-10-CM

## 2021-08-11 PROCEDURE — 99024 POSTOP FOLLOW-UP VISIT: CPT | Performed by: NURSE PRACTITIONER

## 2021-08-11 NOTE — PROGRESS NOTES
I was in the office while conducting this encounter. Consent:  She and/or her healthcare decision maker is aware that this patient-initiated Telehealth encounter is a billable service, with coverage as determined by her insurance carrier. She is aware that she may receive a bill and has provided verbal consent to proceed: Yes    This virtual visit was conducted via [employer]. Pursuant to the emergency declaration under the [facility] [place] [address] waiver authority and the [employer] and [employer] Act, this Virtual  Visit was conducted to reduce the patient's risk of exposure to COVID-19 and provide continuity of care for an established patient. Services were provided through a video synchronous discussion virtually to substitute for in-person clinic visit. Due to this being a TeleHealth evaluation, many elements of the physical examination are unable to be assessed. Total Time: minutes: 11-20 minutes. 4 weeks status post gastric bypass. Pt reports doing well on liquids and soft foods. .    Patient  No complaints of pain   Pt reports no nausea and no vomiting. She states that she had one episode with scrambled eggs. Sheis drinking approximately 48-64 oz of water daily  + BM  She is drinking and eating 50-60 grams of protein daily. Using Gen-Pro    She is taking bariatric vitamins without issue. Total weight loss since surgery 30lbs  Weight loss since last visit 2lbs  Visit Vitals  Ht 5' 5\" (1.651 m)   Wt 265 lb (120.2 kg)   BMI 44.10 kg/m²            Ms. Jenifer Soto has a reminder for a \"due or due soon\" health maintenance. I have asked that she contact her primary care provider for follow-up on this health maintenance.       Physical Examination: General appearance - alert, well appearing, and in no distress,  Chest -no respiratory distress    Abdomen -incisions healing well  A/P    Doing well 4  weeks status post laparoscopic Gastric Bypass  Diet advanced soft meats   Focus on 50-60 grams of protein daily. Encouraged water intake to 64 oz of non-carbonated/no calorie beverages daily. Supplement with unflavored protein powder daily. Stop PPI  No lifting greater than 40 lbs. Follow up in 2 weeks. Pt verbalized understanding and questions were answered to the best of my knowledge and ability. diet educational materials were provided.       Rosario Brooks NP

## 2021-08-11 NOTE — PATIENT INSTRUCTIONS
Constipation: Care Instructions  Your Care Instructions     Constipation means that you have a hard time passing stools (bowel movements). People pass stools from 3 times a day to once every 3 days. What is normal for you may be different. Constipation may occur with pain in the rectum and cramping. The pain may get worse when you try to pass stools. Sometimes there are small amounts of bright red blood on toilet paper or the surface of stools. This is because of enlarged veins near the rectum (hemorrhoids). A few changes in your diet and lifestyle may help you avoid ongoing constipation. Your doctor may also prescribe medicine to help loosen your stool. Some medicines can cause constipation. These include pain medicines and antidepressants. Tell your doctor about all the medicines you take. Your doctor may want to make a medicine change to ease your symptoms. Follow-up care is a key part of your treatment and safety. Be sure to make and go to all appointments, and call your doctor if you are having problems. It's also a good idea to know your test results and keep a list of the medicines you take. How can you care for yourself at home? · Drink plenty of fluids. If you have kidney, heart, or liver disease and have to limit fluids, talk with your doctor before you increase the amount of fluids you drink. · Include high-fiber foods in your diet each day. These include fruits, vegetables, beans, and whole grains. · Get at least 30 minutes of exercise on most days of the week. Walking is a good choice. You also may want to do other activities, such as running, swimming, cycling, or playing tennis or team sports. · Take a fiber supplement, such as Citrucel or Metamucil, every day. Read and follow all instructions on the label. · Schedule time each day for a bowel movement. A daily routine may help. Take your time having your bowel movement.   · Support your feet with a small step stool when you sit on the toilet. This helps flex your hips and places your pelvis in a squatting position. · Your doctor may recommend an over-the-counter laxative to relieve your constipation. Examples are Milk of Magnesia and MiraLax. Read and follow all instructions on the label. Do not use laxatives on a long-term basis. When should you call for help? Call your doctor now or seek immediate medical care if:    · You have new or worse belly pain.     · You have new or worse nausea or vomiting.     · You have blood in your stools. Watch closely for changes in your health, and be sure to contact your doctor if:    · Your constipation is getting worse.     · You do not get better as expected. Where can you learn more? Go to http://www.neri.com/  Enter P343 in the search box to learn more about \"Constipation: Care Instructions. \"  Current as of: February 26, 2020               Content Version: 12.8  © 6257-8756 [employer]. Care instructions adapted under license by [employer] (which disclaims liability or warranty for this information). If you have questions about a medical condition or this instruction, always ask your healthcare professional. [place] [address] any warranty or liability for your use of this information. What you need to know:  1 . Advance your diet to moist meats. Follow the handout that you were given today in the office. 2. Take the recommended vitamins daily  3 No lifting greater than 40 lbs. 4. You can do light jogging, moderate walking and a recumbent bike. 5 Follow up in 2 weeks. 6. You may go into a pool. 7. If you are not able to tolerate liquids, soft foods or moist meats. Please call our office. [phone]  8. If you have vomiting and persistent epigastric pain or chest pain. You should call our office, the doctor on-call or go to the emergency room.           Constipation  Benefiber, Miralax & similar (once or twice daily)  Milk of Magnesia (daily as needed)  Dulcolax suppository  Fleets Enema    Moist Meats   What is this diet?  This phase adds moist meats in addition to foods in Soft & Mushy   Lean protein sources  When do I begin?  When directed by your NP or surgeon, usually 4 weeks after surgery          What new foods can I eat?    Moist meat and poultry   Moist fish and seafood          Shopping Lists      Protein - include with every meal   Tuna packed in water   [employer] (canned, frozen, fresh)    White flaky fish (gene, cod, flounder, tilapia)    Canned chicken packed in water     96-99% fat free thinly sliced deli meat (ham, turkey, chicken)    Silken Tofu    Skinless turkey or chicken (prepare to a soft texture)    Lean ground meat   Lean pork (cooked until very tender, cut into small pieces)     Sample Meal Plan:  Moist Meats    Breakfast ½ cup soft cooked eggs (2) 16 grams protein   Snack (optional) Low-fat string cheese 5 grams protein   Lunch 2 slices of lean deli turkey (2 oz.), ¼ cup soft fruit or  ½ cup tuna salad made with low-fat mayonnaise 14 grams protein   14 grams protein   Snack (optional) Greek yogurt or low-fat cottage cheese or low-fat string cheese 8-15 grams protein   Dinner Soft/flaky fish (2 oz.)  ¼ cup soft cooked vegetables 14 grams protein

## 2021-08-11 NOTE — PROGRESS NOTES
1. Have you been to the ER, urgent care clinic since your last visit? Hospitalized since your last visit? no    2. Have you seen or consulted any other health care providers outside of the [address] [place] [address] since your last visit? Include any pap smears or colon screening. no    States rash to area where drainage tube was.   Saw PCP recently & was prescribed steroid cream.  States some improvement since using cream.

## 2021-08-25 ENCOUNTER — VIRTUAL VISIT (OUTPATIENT)
Dept: SURGERY | Age: 46
End: 2021-08-25
Payer: MEDICAID

## 2021-08-25 ENCOUNTER — TELEPHONE (OUTPATIENT)
Dept: SURGERY | Age: 46
End: 2021-08-25

## 2021-08-25 VITALS — BODY MASS INDEX: 41.99 KG/M2 | WEIGHT: 252 LBS | HEIGHT: 65 IN

## 2021-08-25 DIAGNOSIS — E66.01 MORBID OBESITY (HCC): Primary | ICD-10-CM

## 2021-08-25 DIAGNOSIS — Z09 SURGICAL FOLLOWUP: ICD-10-CM

## 2021-08-25 PROCEDURE — 99024 POSTOP FOLLOW-UP VISIT: CPT | Performed by: NURSE PRACTITIONER

## 2021-08-25 NOTE — PROGRESS NOTES
I was in the office while conducting this encounter. Consent:  She and/or her healthcare decision maker is aware that this patient-initiated Telehealth encounter is a billable service, with coverage as determined by her insurance carrier. She is aware that she may receive a bill and has provided verbal consent to proceed: Yes    This virtual visit was conducted via [employer]. Pursuant to the emergency declaration under the [facility], [facility] waiver authority and the [employer] Exchange and [employer] Act, this Virtual  Visit was conducted to reduce the patient's risk of exposure to COVID-19 and provide continuity of care for an established patient. Services were provided through a video synchronous discussion virtually to substitute for in-person clinic visit. Due to this being a TeleHealth evaluation, many elements of the physical examination are unable to be assessed. Total Time: minutes: 11-20 minutes. 6 weeks status post gastric bypass. Pt reports doing well on liquids, soft and soft meats. Patient no complaints of pain. Pt reports no vomiting. She gets some nausea with some food. She is trying to figure out what foods work the best for her. Sheis drinking approximately 64 oz of water daily  + BM  She is drinking and eating 40+ grams of protein daily. She is taking bariatric vitamins without issue. Total weight loss since surgery 43lbs  Weight loss since last visit 13lbs  Visit Vitals  Ht 5' 5\" (1.651 m)   Wt 252 lb (114.3 kg)   BMI 41.93 kg/m²              Ms. Carola Emery has a reminder for a \"due or due soon\" health maintenance. I have asked that she contact her primary care provider for follow-up on this health maintenance. Physical Examination: General appearance - alert, well appearing, and in no distress,  Chest - no respiratory distress. Abdomen - incisions healing.      A/P    Doing well 6 weeks status post laparoscopic Gastric Bypass  Diet advanced to solid foods. Advised patient regard to diet that is high-protein, low-fat, low-sugar, limited carbohydrates. Strive for 50-60 grams of protein daily. If having a snack, foods that are protein or fiber rich. . No eating/drinking together, chew foods well, and portion control. Measure meals. Discussed snacking behavior and to [facility] pay attention to behavioral factor and habits. Drink at least 40-64 ounces of water or non-calorie/non-carbonated beverages daily. Continue vitamin regiment daily. Exercise at least 3 days a week with cardiovascular and strength training. Patient to follow up in 10 weeks. . Advised to call office if any questions/concerns. 13 Minutes spent face to face with patient, >50 % of time spent counseling. Pt verbalized understanding and questions were answered to the best of my knowledge and ability.         Alejo Shetty NP

## 2021-08-25 NOTE — PATIENT INSTRUCTIONS
Walking for Exercise: Care Instructions  Your Care Instructions     Walking is one of the easiest ways to get the exercise you need for good health. A brisk, 30-minute walk each day can help you feel better and have more energy. It can help you lower your risk of disease. Walking can help you keep your bones strong and your heart healthy. Check with your doctor before you start a walking plan if you have heart problems, other health issues, or you have not been active in a long time. Follow your doctor's instructions for safe levels of exercise. Follow-up care is a key part of your treatment and safety. Be sure to make and go to all appointments, and call your doctor if you are having problems. It's also a good idea to know your test results and keep a list of the medicines you take. How can you care for yourself at home? Getting started  · Start slowly and set a short-term goal. For example, walk for 5 or 10 minutes every day. · Bit by bit, increase the amount you walk every day. Try for at least 30 minutes on most days of the week. You also may want to swim, bike, or do other activities. · If finding enough time is a problem, it's fine to be active in shorter periods of time throughout your day. · To get the heart-healthy benefits of walking, you need to walk briskly enough to increase your heart rate and breathing, but not so fast that you can't talk comfortably. · Wear comfortable shoes that fit well and provide good support for your feet and ankles. Staying with your plan  · After you've made walking a habit, set a longer-term goal. You may want to set a goal of walking briskly for longer or walking farther. Experts say to do 2½ hours (150 minutes) of moderate activity a week. A faster heartbeat is what defines moderate-level activity. · To stay motivated, walk with friends, coworkers, or pets. · Use a phone hayden or pedometer to track your steps each day. Set a goal to increase your steps.  When you reach that goal, set a higher goal.  · If the weather keeps you from walking outside, go for walks at the mall with a friend. Local schools and churches may have indoor gyms where you can walk. Fitting a walk into your workday  · Park several blocks away from work, or get off the bus a few stops early. · Use the stairs instead of the elevator, at least for a few floors. · Suggest holding meetings with colleagues during a walk inside or outside the building. · Use the restroom that is the farthest from your desk or workstation. · Use your morning and afternoon breaks to take quick 15 minutes walks. Staying safe  · Know your surroundings. Walk in a well-lighted, safe place. If it's dark, walk with a partner. Wear light-colored clothing. If you can, buy a vest or jacket that reflects light. · Carry a cell phone for emergencies. · Drink plenty of water. Take a water bottle with you when you walk. This is very important if it is hot out. · Be careful not to slip on wet or icy ground. You can buy \"grippers\" for your shoes to help keep you from slipping. · Pay attention to your walking surface. Use sidewalks and paths. · If you have health issues such as asthma, COPD, or heart problems, or if you haven't been active for a long time, check with your doctor before you start a new activity. Where can you learn more? Go to http://www.gray.com/  Enter R159 in the search box to learn more about \"Walking for Exercise: Care Instructions. \"  Current as of: September 10, 2020               Content Version: 12.8  © 0219-9089 "[employer]. Care instructions adapted under license by [employer] (which disclaims liability or warranty for this information). If you have questions about a medical condition or this instruction, always ask your healthcare professional. [address] any warranty or liability for your use of this information.

## 2021-08-25 NOTE — PROGRESS NOTES
1. Have you been to the ER, urgent care clinic since your last visit? Hospitalized since your last visit? No    2. Have you seen or consulted any other health care providers outside of the [address] [place] [address] since your last visit? Include any pap smears or colon screening.  No

## 2021-11-02 ENCOUNTER — OFFICE VISIT (OUTPATIENT)
Dept: INTERNAL MEDICINE CLINIC | Age: 46
End: 2021-11-02
Payer: MEDICAID

## 2021-11-02 VITALS
RESPIRATION RATE: 18 BRPM | HEART RATE: 60 BPM | OXYGEN SATURATION: 100 % | HEIGHT: 65 IN | SYSTOLIC BLOOD PRESSURE: 131 MMHG | DIASTOLIC BLOOD PRESSURE: 89 MMHG | BODY MASS INDEX: 38.7 KG/M2 | WEIGHT: 232.3 LBS | TEMPERATURE: 98.4 F

## 2021-11-02 DIAGNOSIS — E61.1 IRON DEFICIENCY: ICD-10-CM

## 2021-11-02 DIAGNOSIS — M54.50 CHRONIC RIGHT-SIDED LOW BACK PAIN WITHOUT SCIATICA: ICD-10-CM

## 2021-11-02 DIAGNOSIS — T14.8XXA BRUISING: ICD-10-CM

## 2021-11-02 DIAGNOSIS — E03.9 ACQUIRED HYPOTHYROIDISM: ICD-10-CM

## 2021-11-02 DIAGNOSIS — Z11.59 ENCOUNTER FOR HEPATITIS C SCREENING TEST FOR LOW RISK PATIENT: ICD-10-CM

## 2021-11-02 DIAGNOSIS — Z23 NEEDS FLU SHOT: ICD-10-CM

## 2021-11-02 DIAGNOSIS — Z90.3 HISTORY OF PARTIAL GASTRECTOMY: Primary | ICD-10-CM

## 2021-11-02 DIAGNOSIS — G89.29 CHRONIC RIGHT-SIDED LOW BACK PAIN WITHOUT SCIATICA: ICD-10-CM

## 2021-11-02 DIAGNOSIS — Z79.899 ENCOUNTER FOR LONG-TERM (CURRENT) USE OF OTHER MEDICATIONS: ICD-10-CM

## 2021-11-02 DIAGNOSIS — E53.8 B12 DEFICIENCY: ICD-10-CM

## 2021-11-02 PROCEDURE — 90686 IIV4 VACC NO PRSV 0.5 ML IM: CPT | Performed by: FAMILY MEDICINE

## 2021-11-02 PROCEDURE — 99214 OFFICE O/P EST MOD 30 MIN: CPT | Performed by: FAMILY MEDICINE

## 2021-11-02 PROCEDURE — 90471 IMMUNIZATION ADMIN: CPT | Performed by: FAMILY MEDICINE

## 2021-11-02 RX ORDER — CALCIUM CARBONATE 500(1250)
4 TABLET ORAL DAILY
COMMUNITY
End: 2021-11-02

## 2021-11-02 RX ORDER — MULTIVIT-MIN/IRON/FOLIC ACID/K 45-800-120
2 CAPSULE ORAL
COMMUNITY

## 2021-11-02 RX ORDER — CYCLOBENZAPRINE HCL 10 MG
10 TABLET ORAL
Qty: 10 TABLET | Refills: 0 | Status: SHIPPED | OUTPATIENT
Start: 2021-11-02

## 2021-11-02 NOTE — PROGRESS NOTES
SPORTS MEDICINE AND PRIMARY CARE  Jennefer Meckel. MD Josy  [address]  [id number]    Chief Complaint   Patient presents with    Follow-up     surgery follow up    Labs    Immunization/Injection     flu shot       SUBJECTIVE:    Paul Goddard is a 51 y.o. female for general evaluation following gastric bypass. Doing well after gastric bypass. Continue to lose weight weekly. Has no major complaints. Requests post op labs. Reports spontaneous bruising. covid vacc+  [facility]  Negative cologuard  history    Current Outpatient Medications   Medication Sig Dispense Refill    multivitamin-min-iron-FA-vit K (Bariatric Multivitamins) 45 mg iron- 800 mcg-120 mcg cap Take 2 Capsules by mouth.  cyclobenzaprine (FLEXERIL) 10 mg tablet Take 1 Tablet by mouth three (3) times daily as needed for Muscle Spasm(s). 10 Tablet 0    calcium citrate/vitamin D3 (CALCIUM CITRATE + D PO) Take  by mouth two (2) times a day.  levothyroxine (SYNTHROID) 200 mcg tablet Take 1 Tablet by mouth Daily (before breakfast). 90 Tablet 3    triamcinolone acetonide (KENALOG) 0.1 % ointment Apply  to affected area two (2) times a day. use thin layer 30 g 0    omeprazole (PRILOSEC) 40 mg capsule Take 1 Capsule by mouth daily. AFTER SURGERY (Patient not taking: Reported on 11/2/2021) 30 Capsule 1    multivitamin (ONE A DAY) tablet Take 1 Tablet by mouth daily. Unjury multivitamin       Past Medical History:   Diagnosis Date    ADD (attention deficit disorder)     Anxiety     Anxiety     Carpal tunnel syndrome     left    Chronic pain     COVID-19 ruled out     COVID-19 vaccine series completed     4/5/21,4/28/21    DJD (degenerative joint disease)     Endocrine disease     hypothyroidism    H.  PYLORI INFECTION     treated with triple therapy in Fall 2008    Hypertension     Sleep apnea     Unspecified hypothyroidism      Past Surgical History:   Procedure Laterality Date    HX GASTRIC BYPASS  07/2021    HX GYN      Bartholin cyst removal    HX LAP GASTRIC BYPASS  07/14/2021    by Dr. Adan Romeo at [address]    HX TUBAL LIGATION  2005    [facility] GLAND/CYST       Allergies   Allergen Reactions    Darvocet A500 [Propoxyphene N-Acetaminophen] Hives    Sertraline Other (comments)     Intolerance       REVIEW OF SYSTEMS:  Per hpi      Social History     Socioeconomic History    Marital status: SINGLE    Number of children: 5   Occupational History    Occupation: [employer] -    Tobacco Use    Smoking status: Never Smoker    Smokeless tobacco: Never Used   Vaping Use    Vaping Use: Never used   Substance and Sexual Activity    Alcohol use: No    Drug use: No    Sexual activity: Never   Other Topics Concern    Exercise Yes     Comment: Walking 4 miles/day to/from work. Social History Narrative    Living with 25 yo daughter and 2 grandchildren in [place]. No pets in the house. 21, 25, 15, 5 yo children - 2 youngest children live in [facility] with pt's mother. Family History   Problem Relation Age of Onset    Hypertension Mother     Hypertension Father     Stroke Father         age 71    Thyroid Disease Sister         hypothyroidism    Arthritis-rheumatoid Sister     Anesth Problems Neg Hx        OBJECTIVE:     Visit Vitals  /89   Pulse 60   Temp 98.4 °F (36.9 °C) (Oral)   Resp 18   Ht 5' 5\" (1.651 m)   Wt 232 lb 4.8 oz (105.4 kg)   LMP 10/20/2021 (Exact Date)   SpO2 100%   BMI 38.66 kg/m²     CONSTITUTIONAL:  appears in usual state of health  EYES:  eom intact, upper eyelid xanthelasma  ENMT:moist mucous membranes,  NECK: supple.    RESPIRATORY: Chest: clear bilaterally  INTEGUMENT: warm and dry, tender sq mass over rt posterior hip, tender bruising over LUE/forearm  NEUROLOGIC: non-focal exam   MENTAL STATUS: alert and oriented, appropriate affect      No visits with results within 3 Month(s) from this visit. Latest known visit with results is:   Admission on 07/14/2021, Discharged on 07/16/2021   Component Date Value Ref Range Status    Pregnancy test,urine (POC) 07/14/2021 Negative  NEG   Final    Sodium 07/15/2021 138  136 - 145 mmol/L Final    Potassium 07/15/2021 4.1  3.5 - 5.1 mmol/L Final    Chloride 07/15/2021 107  97 - 108 mmol/L Final    CO2 07/15/2021 27  21 - 32 mmol/L Final    Anion gap 07/15/2021 4* 5 - 15 mmol/L Final    Glucose 07/15/2021 128* 65 - 100 mg/dL Final    BUN 07/15/2021 9  6 - 20 MG/DL Final    Creatinine 07/15/2021 0.83  0.55 - 1.02 MG/DL Final    BUN/Creatinine ratio 07/15/2021 11* 12 - 20   Final    GFR est AA 07/15/2021 >60  >60 ml/min/1.73m2 Final    GFR est non-AA 07/15/2021 >60  >60 ml/min/1.73m2 Final    Estimated GFR is calculated using the IDMS-traceable Modification of Diet in Renal Disease (MDRD) Study equation, reported for both  Americans (GFRAA) and non- Americans (GFRNA), and normalized to 1.73m2 body surface area. The physician must decide which value applies to the patient.     Calcium 07/15/2021 9.0  8.5 - 10.1 MG/DL Final    WBC 07/15/2021 17.6* 3.6 - 11.0 K/uL Final    RBC 07/15/2021 4.85  3.80 - 5.20 M/uL Final    HGB 07/15/2021 14.2  11.5 - 16.0 g/dL Final    HCT 07/15/2021 43.9  35.0 - 47.0 % Final    MCV 07/15/2021 90.5  80.0 - 99.0 FL Final    MCH 07/15/2021 29.3  26.0 - 34.0 PG Final    MCHC 07/15/2021 32.3  30.0 - 36.5 g/dL Final    RDW 07/15/2021 12.5  11.5 - 14.5 % Final    PLATELET 70/40/8664 167  150 - 400 K/uL Final    MPV 07/15/2021 11.6  8.9 - 12.9 FL Final    NRBC 07/15/2021 0.0  0  WBC Final    ABSOLUTE NRBC 07/15/2021 0.00  0.00 - 0.01 K/uL Final    NEUTROPHILS 07/15/2021 82* 32 - 75 % Final    LYMPHOCYTES 07/15/2021 9* 12 - 49 % Final    MONOCYTES 07/15/2021 8  5 - 13 % Final    EOSINOPHILS 07/15/2021 0  0 - 7 % Final    BASOPHILS 07/15/2021 0  0 - 1 % Final    IMMATURE GRANULOCYTES 07/15/2021 1* 0.0 - 0.5 % Final    ABS. NEUTROPHILS 07/15/2021 14.6* 1.8 - 8.0 K/UL Final    ABS. LYMPHOCYTES 07/15/2021 1.6  0.8 - 3.5 K/UL Final    ABS. MONOCYTES 07/15/2021 1.4* 0.0 - 1.0 K/UL Final    ABS. EOSINOPHILS 07/15/2021 0.0  0.0 - 0.4 K/UL Final    ABS. BASOPHILS 07/15/2021 0.0  0.0 - 0.1 K/UL Final    ABS. IMM. GRANS. 07/15/2021 0.1* 0.00 - 0.04 K/UL Final    DF 07/15/2021 AUTOMATED    Final    WBC 07/16/2021 10.2  3.6 - 11.0 K/uL Final    RBC 07/16/2021 4.15  3.80 - 5.20 M/uL Final    HGB 07/16/2021 12.3  11.5 - 16.0 g/dL Final    HCT 07/16/2021 38.9  35.0 - 47.0 % Final    MCV 07/16/2021 93.7  80.0 - 99.0 FL Final    MCH 07/16/2021 29.6  26.0 - 34.0 PG Final    MCHC 07/16/2021 31.6  30.0 - 36.5 g/dL Final    RDW 07/16/2021 13.0  11.5 - 14.5 % Final    PLATELET 63/56/2943 535  150 - 400 K/uL Final    MPV 07/16/2021 11.4  8.9 - 12.9 FL Final    NRBC 07/16/2021 0.0  0  WBC Final    ABSOLUTE NRBC 07/16/2021 0.00  0.00 - 0.01 K/uL Final    NEUTROPHILS 07/16/2021 54  32 - 75 % Final    LYMPHOCYTES 07/16/2021 35  12 - 49 % Final    MONOCYTES 07/16/2021 11  5 - 13 % Final    EOSINOPHILS 07/16/2021 0  0 - 7 % Final    BASOPHILS 07/16/2021 0  0 - 1 % Final    IMMATURE GRANULOCYTES 07/16/2021 0  0.0 - 0.5 % Final    ABS. NEUTROPHILS 07/16/2021 5.4  1.8 - 8.0 K/UL Final    ABS. LYMPHOCYTES 07/16/2021 3.6* 0.8 - 3.5 K/UL Final    ABS. MONOCYTES 07/16/2021 1.2* 0.0 - 1.0 K/UL Final    ABS. EOSINOPHILS 07/16/2021 0.0  0.0 - 0.4 K/UL Final    ABS. BASOPHILS 07/16/2021 0.0  0.0 - 0.1 K/UL Final    ABS. IMM.  GRANS. 07/16/2021 0.0  0.00 - 0.04 K/UL Final    DF 07/16/2021 AUTOMATED    Final    Sodium 07/16/2021 140  136 - 145 mmol/L Final    Potassium 07/16/2021 4.2  3.5 - 5.1 mmol/L Final    Chloride 07/16/2021 108  97 - 108 mmol/L Final    CO2 07/16/2021 28  21 - 32 mmol/L Final    Anion gap 07/16/2021 4* 5 - 15 mmol/L Final    Glucose 07/16/2021 78  65 - 100 mg/dL Final  BUN 07/16/2021 14  6 - 20 MG/DL Final    Creatinine 07/16/2021 0.89  0.55 - 1.02 MG/DL Final    BUN/Creatinine ratio 07/16/2021 16  12 - 20   Final    GFR est AA 07/16/2021 >60  >60 ml/min/1.73m2 Final    GFR est non-AA 07/16/2021 >60  >60 ml/min/1.73m2 Final    Calcium 07/16/2021 8.6  8.5 - 10.1 MG/DL Final       ASSESSMENT:   1. History of partial gastrectomy -stable   2. Bruising , rule out coagulation defect   3. Acquired hypothyroidism - stable TRT   4. Needs flu shot    5. Iron deficiency    6. B12 deficiency    7. Encounter for long-term (current) use of other medications    8. Chronic right-sided low back pain without sciatica  - stable, uses flexeril prn   9. Encounter for hepatitis C screening test for low risk patient          PLAN:  .  Orders Placed This Encounter    Influenza Virus Vaccine QUAD, PF Syr 6 Months + (Flulaval, Fluzone, Fluarix 21226)    TSH 3RD GENERATION    T4, FREE    CBC WITH AUTOMATED DIFF    PTT    IRON    VITAMIN X23    METABOLIC PANEL, COMPREHENSIVE    HEPATITIS C AB    PROTHROMBIN TIME + INR    multivitamin-min-iron-FA-vit K (Bariatric Multivitamins) 45 mg iron- 800 mcg-120 mcg cap    DISCONTD: calcium carbonate (OS-KAROLINA) 500 mg calcium (1,250 mg) tablet    cyclobenzaprine (FLEXERIL) 10 mg tablet       Follow-up and Dispositions    · Return in about 3 months (around 2/2/2022). I have discussed the diagnosis with the patient and the intended plan as seen in the  orders above. The patient understands and agrees with the plan. The patient has   received an after visit summary. Questions were answered concerning  future plans  Patient labs and/or xrays were reviewed as available. Past records were reviewed as available.     Counseled regarding diet, exercise and healthy lifestyle          Advised patient to  call back or return to office if symptoms develop/worsen/change/persist.  Discussed expected course/resolution/complications of diagnosis in detail with patient. Genoveva Aguilar M.D. This note was created using voice recognition software.   Edits have been made but syntax errors might exist.

## 2021-11-02 NOTE — PATIENT INSTRUCTIONS
Vaccine Information Statement    Influenza (Flu) Vaccine (Inactivated or Recombinant): What You Need to Know    Many vaccine information statements are available in Nepali and other languages. See www.immunize.org/vis. Hojas de información sobre vacunas están disponibles en español y en muchos otros idiomas. Visite www.immunize.org/vis. 1. Why get vaccinated? Influenza vaccine can prevent influenza (flu). Flu is a contagious disease that spreads around the [facility] every year, usually between October and May. Anyone can get the flu, but it is more dangerous for some people. Infants and young children, people 72 years and older, pregnant people, and people with certain health conditions or a weakened immune system are at greatest risk of flu complications. Pneumonia, bronchitis, sinus infections, and ear infections are examples of flu-related complications. If you have a medical condition, such as heart disease, cancer, or diabetes, flu can make it worse. Flu can cause fever and chills, sore throat, muscle aches, fatigue, cough, headache, and runny or stuffy nose. Some people may have vomiting and diarrhea, though this is more common in children than adults. In an average year, thousands of people in the [facility] die from flu, and many more are hospitalized. Flu vaccine prevents millions of illnesses and flu-related visits to the doctor each year. 2. Influenza vaccines     CDC recommends everyone 6 months and older get vaccinated every flu season. Children 6 months through 6years of age may need 2 doses during a single flu season. Everyone else needs only 1 dose each flu season. It takes about 2 weeks for protection to develop after vaccination. There are many flu viruses, and they are always changing. Each year a new flu vaccine is made to protect against the influenza viruses believed to be likely to cause disease in the upcoming flu season.  Even when the vaccine doesnt exactly match these viruses, it may still provide some protection. Influenza vaccine does not cause flu. Influenza vaccine may be given at the same time as other vaccines. 3. Talk with your health care provider    Tell your vaccination provider if the person getting the vaccine:   Has had an allergic reaction after a previous dose of influenza vaccine, or has any severe, life-threatening allergies    Has ever had Guillain-Barré Syndrome (also called GBS)    In some cases, your health care provider may decide to postpone influenza vaccination until a future visit. Influenza vaccine can be administered at any time during pregnancy. People who are or will be pregnant during influenza season should receive inactivated influenza vaccine. People with minor illnesses, such as a cold, may be vaccinated. People who are moderately or severely ill should usually wait until they recover before getting influenza vaccine. Your health care provider can give you more information. 4. Risks of a vaccine reaction     Soreness, redness, and swelling where the shot is given, fever, muscle aches, and headache can happen after influenza vaccination.  There may be a very small increased risk of Guillain-Barré Syndrome (GBS) after inactivated influenza vaccine (the flu shot). [facility] children who get the flu shot along with pneumococcal vaccine (PCV13) and/or DTaP vaccine at the same time might be slightly more likely to have a seizure caused by fever. Tell your health care provider if a child who is getting flu vaccine has ever had a seizure. People sometimes faint after medical procedures, including vaccination. Tell your provider if you feel dizzy or have vision changes or ringing in the ears. As with any medicine, there is a very remote chance of a vaccine causing a severe allergic reaction, other serious injury, or death. 5. What if there is a serious problem?     An allergic reaction could occur after the vaccinated person leaves the clinic. If you see signs of a severe allergic reaction (hives, swelling of the face and throat, difficulty breathing, a fast heartbeat, dizziness, or weakness), call 9-1-1 and get the person to the nearest hospital.    For other signs that concern you, call your health care provider. Adverse reactions should be reported to the Vaccine Adverse Event Reporting System (VAERS). Your health care provider will usually file this report, or you can do it yourself. Visit the VAERS website at www.vaers. [facility].gov or call 0-624.652.6682. VAERS is only for reporting reactions, and VAERS staff members do not give medical advice. 6. The National Vaccine Injury Compensation Program    The [facility] Vaccine Injury Compensation Program (VICP) is a federal program that was created to compensate people who may have been injured by certain vaccines. Claims regarding alleged injury or death due to vaccination have a time limit for filing, which may be as short as two years. Visit the VICP website at www.[facility].gov/vaccinecompensation or call 9-655.466.5774 to learn about the program and about filing a claim. 7. How can I learn more?  Ask your health care provider.  Call your local or state health department.  Visit the website of the Food and Drug Administration (FDA) for vaccine package inserts and additional information at www.fda.gov/vaccines-blood-biologics/vaccines.  Contact the Centers for Disease Control and Prevention (CDC):  - Call 2-573.900.8451 (1-800-CDC-INFO) or  - Visit CDCs influenza website at www.cdc.gov/flu. Vaccine Information Statement   Inactivated Influenza Vaccine   8/6/2021  42 CHRISTIE Ingram [id number]-00   Department of Health and Human Services  Centers for Disease Control and Prevention    Office Use Only

## 2021-11-02 NOTE — PROGRESS NOTES
1. Have you been to the ER, urgent care clinic since your last visit? Hospitalized since your last visit? No    2. Have you seen or consulted any other health care providers outside of the [address] [place] [address] since your last visit? Include any pap smears or colon screening.  No  Chief Complaint   Patient presents with    Follow-up     surgery follow up   [facility] Immunization/Injection     flu shot     Visit Vitals  /89   Pulse 60   Temp 98.4 °F (36.9 °C) (Oral)   Resp 18   Ht 5' 5\" (1.651 m)   Wt 232 lb 4.8 oz (105.4 kg)   SpO2 100%   BMI 38.66 kg/m²

## 2021-11-03 ENCOUNTER — TELEPHONE (OUTPATIENT)
Dept: SURGERY | Age: 46
End: 2021-11-03

## 2021-11-03 ENCOUNTER — TELEPHONE (OUTPATIENT)
Dept: ENDOCRINOLOGY | Age: 46
End: 2021-11-03

## 2021-11-03 LAB
ALBUMIN SERPL-MCNC: 4 G/DL (ref 3.5–5)
ALBUMIN/GLOB SERPL: 1.2 {RATIO} (ref 1.1–2.2)
ALP SERPL-CCNC: 79 U/L (ref 45–117)
ALT SERPL-CCNC: 24 U/L (ref 12–78)
ANION GAP SERPL CALC-SCNC: 2 MMOL/L (ref 5–15)
APTT PPP: 31 SEC (ref 22.1–31)
AST SERPL-CCNC: 15 U/L (ref 15–37)
BASOPHILS # BLD: 0 K/UL (ref 0–0.1)
BASOPHILS NFR BLD: 1 % (ref 0–1)
BILIRUB SERPL-MCNC: 0.4 MG/DL (ref 0.2–1)
BUN SERPL-MCNC: 13 MG/DL (ref 6–20)
BUN/CREAT SERPL: 20 (ref 12–20)
CALCIUM SERPL-MCNC: 9.5 MG/DL (ref 8.5–10.1)
CHLORIDE SERPL-SCNC: 109 MMOL/L (ref 97–108)
CO2 SERPL-SCNC: 28 MMOL/L (ref 21–32)
CREAT SERPL-MCNC: 0.66 MG/DL (ref 0.55–1.02)
DIFFERENTIAL METHOD BLD: NORMAL
EOSINOPHIL # BLD: 0.1 K/UL (ref 0–0.4)
EOSINOPHIL NFR BLD: 2 % (ref 0–7)
ERYTHROCYTE [DISTWIDTH] IN BLOOD BY AUTOMATED COUNT: 13.6 % (ref 11.5–14.5)
GLOBULIN SER CALC-MCNC: 3.3 G/DL (ref 2–4)
GLUCOSE SERPL-MCNC: 89 MG/DL (ref 65–100)
HCT VFR BLD AUTO: 42.3 % (ref 35–47)
HCV AB SERPL QL IA: NONREACTIVE
HGB BLD-MCNC: 13.1 G/DL (ref 11.5–16)
IMM GRANULOCYTES # BLD AUTO: 0 K/UL (ref 0–0.04)
IMM GRANULOCYTES NFR BLD AUTO: 0 % (ref 0–0.5)
INR PPP: 1.2 (ref 0.9–1.1)
IRON SERPL-MCNC: 28 UG/DL (ref 35–150)
LYMPHOCYTES # BLD: 3 K/UL (ref 0.8–3.5)
LYMPHOCYTES NFR BLD: 36 % (ref 12–49)
MCH RBC QN AUTO: 29.1 PG (ref 26–34)
MCHC RBC AUTO-ENTMCNC: 31 G/DL (ref 30–36.5)
MCV RBC AUTO: 94 FL (ref 80–99)
MONOCYTES # BLD: 0.9 K/UL (ref 0–1)
MONOCYTES NFR BLD: 10 % (ref 5–13)
NEUTS SEG # BLD: 4.2 K/UL (ref 1.8–8)
NEUTS SEG NFR BLD: 51 % (ref 32–75)
NRBC # BLD: 0 K/UL (ref 0–0.01)
NRBC BLD-RTO: 0 PER 100 WBC
PLATELET # BLD AUTO: 199 K/UL (ref 150–400)
POTASSIUM SERPL-SCNC: 4.7 MMOL/L (ref 3.5–5.1)
PROT SERPL-MCNC: 7.3 G/DL (ref 6.4–8.2)
PROTHROMBIN TIME: 12.3 SEC (ref 9–11.1)
RBC # BLD AUTO: 4.5 M/UL (ref 3.8–5.2)
SODIUM SERPL-SCNC: 139 MMOL/L (ref 136–145)
T4 FREE SERPL-MCNC: 1.2 NG/DL (ref 0.8–1.5)
THERAPEUTIC RANGE,PTTT: NORMAL SECS (ref 58–77)
TSH SERPL DL<=0.05 MIU/L-ACNC: 5.19 UIU/ML (ref 0.36–3.74)
VIT B12 SERPL-MCNC: 754 PG/ML (ref 193–986)
WBC # BLD AUTO: 8.3 K/UL (ref 3.6–11)

## 2021-11-03 NOTE — TELEPHONE ENCOUNTER
----- Message from Carie Polo sent at 11/3/2021  9:48 AM EDT -----  Regarding: Md Voss/Telephone  General Message/Vendor Calls    Caller's first and last name: n/a       Reason for call: Needs TSH Levels are high, Patient needs  dr to increase dosage in medication      Callback required yes/no and why: Yes,stated above.        Best contact number(s): [phone]      Details to clarify the request:      Carie Polo

## 2021-11-03 NOTE — TELEPHONE ENCOUNTER
Returned call to patient. Verified ID x 2. Advised of the following recommendation per Arnav Pierson, NP:     Labs look okay except for her iron. Jacinta Ayalaveronicar sure that she is taking a multivitamin with iron supplementation. If she is, then she probably needs to add Vitron-C daily.  She should increase her fluid intake but as long as she is drinking okay and can continue to work on her fluids, I don't think she is dehydrated. Patient reports she works the  at [employer] and it is difficult to get in fluids while at work. States she is uncertain about how much she is getting in each day but agrees to work on increasing the amount. Confirms she is taking a multivitamin with iron. States she will add Vitron-C daily. She is asking about Anion gap being low & elevated chloride level. Voices concern & states PCP mentioned this may be related to poor kidney function. Advised will check with Arnav Pierson and return call with response.

## 2021-11-03 NOTE — TELEPHONE ENCOUNTER
Spoke with patient. Advised per Bri Bentley, no cause for concern regarding labs. Patient verbalized understanding & appreciation for call.

## 2021-11-03 NOTE — TELEPHONE ENCOUNTER
Patient called in stating she was told to follow up with bariatrics about her recent lab work at her PCPs office.  They told her she is showing signs of dehydration and some of the labs are abnormal. Patient is requesting a call back about them  CB: [phone]

## 2021-11-04 NOTE — TELEPHONE ENCOUNTER
Pt was made aware, per Dr Don Funez, via voicemail, that a follow up appt is needed to to discuss in details of her  concerns. Pt was then given our PSR's direct number to call and schedule a follow up.

## 2021-11-04 NOTE — TELEPHONE ENCOUNTER
She had gastric bypass recently - maybe she didn't get her dose in the hospital; maybe she has been missing doses.  Maybe they put her on meds that can interact. We just don't go changing doses when we have no idea the situation.   There could be a simple fix.       Usually when I have a stable thyroid patient suddenly have an 'off' lab - I do nothing and repeat it in 6 weeks. 80% of the time its back to normal and no dose change needed. She can make a follow up with me to discuss.

## 2021-11-10 ENCOUNTER — VIRTUAL VISIT (OUTPATIENT)
Dept: SURGERY | Age: 46
End: 2021-11-10
Payer: MEDICAID

## 2021-11-10 VITALS — BODY MASS INDEX: 36.99 KG/M2 | WEIGHT: 222 LBS | HEIGHT: 65 IN

## 2021-11-10 DIAGNOSIS — Z98.84 S/P GASTRIC BYPASS: ICD-10-CM

## 2021-11-10 DIAGNOSIS — E66.01 MORBID OBESITY (HCC): Primary | ICD-10-CM

## 2021-11-10 PROCEDURE — 99212 OFFICE O/P EST SF 10 MIN: CPT | Performed by: NURSE PRACTITIONER

## 2021-11-10 RX ORDER — IRON,CARBONYL/ASCORBIC ACID 65MG-125MG
TABLET, DELAYED RELEASE (ENTERIC COATED) ORAL
COMMUNITY

## 2021-11-10 RX ORDER — ONDANSETRON 4 MG/1
4 TABLET, ORALLY DISINTEGRATING ORAL
Qty: 30 TABLET | Refills: 0 | Status: SHIPPED | OUTPATIENT
Start: 2021-11-10 | End: 2022-02-04 | Stop reason: ALTCHOICE

## 2021-11-10 NOTE — PATIENT INSTRUCTIONS
Eating Healthy Foods: Care Instructions  Your Care Instructions     Eating healthy foods can help lower your risk for disease. Healthy food gives you energy and keeps your heart strong, your brain active, your muscles working, and your bones strong. A healthy diet includes a variety of foods from the basic food groups: grains, vegetables, fruits, milk and milk products, and meat and beans. Some people may eat more of their favorite foods from only one food group and, as a result, miss getting the nutrients they need. So, it is important to pay attention not only to what you eat but also to what you are missing from your diet. You can eat a healthy, balanced diet by making a few small changes. Follow-up care is a key part of your treatment and safety. Be sure to make and go to all appointments, and call your doctor if you are having problems. It's also a good idea to know your test results and keep a list of the medicines you take. How can you care for yourself at home? Look at what you eat  · Keep a food diary for a week or two and record everything you eat or drink. Track the number of servings you eat from each food group. · For a balanced diet every day, eat a variety of:  ? 6 or more ounce-equivalents of grains, such as cereals, breads, crackers, rice, or pasta, every day. An ounce-equivalent is 1 slice of bread, 1 cup of ready-to-eat cereal, or ½ cup of cooked rice, cooked pasta, or cooked cereal.  ? 2½ cups of vegetables, especially:  § Dark-green vegetables such as broccoli and spinach. § Orange vegetables such as carrots and sweet potatoes. § Dry beans (such as hill and kidney beans) and peas (such as lentils). ? 2 cups of fresh, frozen, or canned fruit. A small apple or 1 banana or orange equals 1 cup. ? 3 cups of nonfat or low-fat milk, yogurt, or other milk products. ? 5½ ounces of meat and beans, such as chicken, fish, lean meat, beans, nuts, and seeds.  One egg, 1 tablespoon of peanut butter, ½ ounce nuts or seeds, or ¼ cup of cooked beans equals 1 ounce of meat. · Learn how to read food labels for serving sizes and ingredients. Fast-food and convenience-food meals often contain few or no fruits or vegetables. Make sure you eat some fruits and vegetables to make the meal more nutritious. · Look at your food diary. For each food group, add up what you have eaten and then divide the total by the number of days. This will give you an idea of how much you are eating from each food group. See if you can find some ways to change your diet to make it more healthy. Start small  · Do not try to make dramatic changes to your diet all at once. You might feel that you are missing out on your favorite foods and then be more likely to fail. · Start slowly, and gradually change your habits. Try some of the following:  ? Use whole wheat bread instead of white bread. ? Use nonfat or low-fat milk instead of whole milk. ? Eat brown rice instead of white rice, and eat whole wheat pasta instead of white-flour pasta. ? Try low-fat cheeses and low-fat yogurt. ? Add more fruits and vegetables to meals and have them for snacks. ? Add lettuce, tomato, cucumber, and onion to sandwiches. ? Add fruit to yogurt and cereal.  Enjoy food  · You can still eat your favorite foods. You just may need to eat less of them. If your favorite foods are high in fat, salt, and sugar, limit how often you eat them, but do not cut them out entirely. · Eat a wide variety of foods. Make healthy choices when eating out  · The type of restaurant you choose can help you make healthy choices. Even fast-food chains are now offering more low-fat or healthier choices on the menu. · Choose smaller portions, or take half of your meal home. · When eating out, try:  ? A veggie pizza with a whole wheat crust or grilled chicken (instead of sausage or pepperoni).   ? Pasta with roasted vegetables, grilled chicken, or marinara sauce instead of cream sauce. ? A vegetable wrap or grilled chicken wrap. ? Broiled or poached food instead of fried or breaded items. Make healthy choices easy  · Buy packaged, prewashed, ready-to-eat fresh vegetables and fruits, such as baby carrots, salad mixes, and chopped or shredded broccoli and cauliflower. · Buy packaged, presliced fruits, such as melon or pineapple. · Choose 100% fruit or vegetable juice instead of soda. Limit juice intake to 4 to 6 oz (½ to ¾ cup) a day. · Blend low-fat yogurt, fruit juice, and canned or frozen fruit to make a smoothie for breakfast or a snack. Where can you learn more? Go to http://www.neri.com/  Enter T756 in the search box to learn more about \"Eating Healthy Foods: Care Instructions. \"  Current as of: December 17, 2020               Content Version: 13.0  © 2006-2021 Healthwise[employer] Incorporated. Care instructions adapted under license by [employer] (which disclaims liability or warranty for this information). If you have questions about a medical condition or this instruction, always ask your healthcare professional. [place] [address] any warranty or liability for your use of this information.

## 2021-11-10 NOTE — PROGRESS NOTES
1. Have you been to the ER, urgent care clinic since your last visit? Hospitalized since your last visit? no    2. Have you seen or consulted any other health care providers outside of the [address] [place] [address] since your last visit? Include any pap smears or colon screening.  no

## 2022-02-04 ENCOUNTER — OFFICE VISIT (OUTPATIENT)
Dept: ENDOCRINOLOGY | Age: 47
End: 2022-02-04
Payer: MEDICAID

## 2022-02-04 VITALS
DIASTOLIC BLOOD PRESSURE: 78 MMHG | HEIGHT: 64 IN | WEIGHT: 205 LBS | BODY MASS INDEX: 35 KG/M2 | OXYGEN SATURATION: 97 % | RESPIRATION RATE: 16 BRPM | HEART RATE: 83 BPM | SYSTOLIC BLOOD PRESSURE: 111 MMHG

## 2022-02-04 DIAGNOSIS — E03.9 HYPOTHYROIDISM, UNSPECIFIED TYPE: Primary | ICD-10-CM

## 2022-02-04 PROCEDURE — 99214 OFFICE O/P EST MOD 30 MIN: CPT | Performed by: INTERNAL MEDICINE

## 2022-02-04 NOTE — PROGRESS NOTES
Chief Complaint   Patient presents with    Thyroid Problem     History of Present Illness: Deborah Mccall is a 51 y.o. female with a past medical history significant for ADD, anxiety, chronic pain, hypertension, obstructive sleep apnea seen in follow up for discussion related to hypothyroidism. Initial visit:  Is scheduled to have janice-en-y on July 14th- Cindy Rojas MD with [facility]. 04/12 labs was taking 175mcg. Hadn't heard anything back from MD who ordered labs, was tired of feeling sluggish, was taking 175mcg and every other day 1/3 of another tablet. Started that in May. Was taking levothyroxine with BP pill. Was taking multivitamin right after levothyroxine starting in May. Wasn't waiting an hour to drink cup of coffee. Weighs 295lbs- 133 kg--> 212mcg. 3 days has been taking 1.5 tablets 262mcg. 02/04/2021:Reduced dose from 225mcg to 200mcg in July. When Dr. Jose Lezama did labs, iron was very low- started viviton c- taking 1 hr after levothyroxine. Is having neck pain, arthritis. Was losing weight at a high pace- has been stuck for a month and a half- is walking, lifting weights. 297-->205lbs now. Taking care of 4 grandchildren, her daughter who is living with her. Current Outpatient Medications   Medication Sig    iron,carbonyl-vitamin C (Vitron-C) 65 mg iron- 125 mg TbEC Take  by mouth.  multivitamin-min-iron-FA-vit K (Bariatric Multivitamins) 45 mg iron- 800 mcg-120 mcg cap Take 2 Capsules by mouth.  cyclobenzaprine (FLEXERIL) 10 mg tablet Take 1 Tablet by mouth three (3) times daily as needed for Muscle Spasm(s).  calcium citrate/vitamin D3 (CALCIUM CITRATE + D PO) Take  by mouth two (2) times a day.  triamcinolone acetonide (KENALOG) 0.1 % ointment Apply  to affected area two (2) times a day. use thin layer    levothyroxine (SYNTHROID) 200 mcg tablet Take 1 Tablet by mouth Daily (before breakfast).  multivitamin (ONE A DAY) tablet Take 1 Tablet by mouth daily.  Connie Wyman multivitamin     No current facility-administered medications for this visit. no biotin     Social Hx: Lives in [facility] [address]:  - Constitutional Symptoms: Endorses fatigue  - Eyes: no blurry vision or double vision  - Cardiovascular: no chest pain or palpitations  - Respiratory: no cough or shortness of breath  - Gastrointestinal: no dysphagia or abdominal pain  - Musculoskeletal: no joint pains or weakness  - Integumentary: no rashes  - Neurological: no numbness, tingling, or headaches  - Psychiatric: no depression or anxiety  - Endocrine: no heat or cold intolerance, no polyuria or polydipsia    - Physical Examination:  Visit Vitals  /78   Pulse 83   Resp 16   Ht 5' 4\" (1.626 m)   Wt 205 lb (93 kg)   SpO2 97%   BMI 35.19 kg/m²     -   - - GENERAL: NCAT, Appears well nourished   - EYES: EOMI, non-icteric, no proptosis   - Ear/Nose/Throat: NCAT, no visible inflammation or masses   - CARDIOVASCULAR: no cyanosis, no visible JVD   - RESPIRATORY: respiratory effort normal without any distress or labored breathing   - MUSCULOSKELETAL: Normal ROM of neck and upper extremities observed   - SKIN: No rash on face  - NEUROLOGIC:  No facial asymmetry (Cranial nerve 7 motor function), No gaze palsy   - PSYCHIATRIC: Normal affect, Normal insight and judgement     Data Reviewed:       Assessment/Plan: This is a very pleasant 79-year-old female seen in follow up for discussion related to levothyroxine dosing. She increased her levothyroxine dose between April and June of 2021 but despite this TSH did not normalize and free T4 dropped. At the time of our initial consultation, levothyroxine was 262 mcg which is far too much. Reduced to 225 mcg until July 07 - further reduced to 200mcg in 07/2021.  Iron supplementation was begun post-op which has led to reduced absorption, reassess TSH and Free T4 8 weeks after  these by 4 hours at least.    -maximum weight based dose is 160mcg (current weight)  -225mcg of levothyroxine daily led to elevated Free T4 in July so was reduced  -200mcg of levothyroxine daily led to elevated TSH (in the setting of concomitant admin w iron)  --be sure to separate levothyroxine from divalent cations including mg2+/Fe2+/ca2+ as this is why TSH was increasing despite dose increase - 4 hours minimum  -repeat labs in 8 weeks    Copy sent to:Sal Ferris MD, Cheryle Jones MD    RTC in 1 year    Dong Ramos [address] [facility]
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
## 2022-02-18 ENCOUNTER — VIRTUAL VISIT (OUTPATIENT)
Dept: INTERNAL MEDICINE CLINIC | Age: 47
End: 2022-02-18
Payer: MEDICAID

## 2022-02-18 DIAGNOSIS — Z98.84 HISTORY OF ROUX-EN-Y GASTRIC BYPASS: ICD-10-CM

## 2022-02-18 DIAGNOSIS — E61.1 IRON DEFICIENCY: ICD-10-CM

## 2022-02-18 DIAGNOSIS — Z78.9 DRUG INTERACTION: ICD-10-CM

## 2022-02-18 DIAGNOSIS — E03.9 ACQUIRED HYPOTHYROIDISM: ICD-10-CM

## 2022-02-18 DIAGNOSIS — R21 RASH: Primary | ICD-10-CM

## 2022-02-18 PROCEDURE — 99213 OFFICE O/P EST LOW 20 MIN: CPT | Performed by: FAMILY MEDICINE

## 2022-02-18 NOTE — PROGRESS NOTES
Pt is getting rashes on stomach from loose skin.   Chief Complaint   Patient presents with    Follow-up     3 month follow up for lab

## 2022-02-20 RX ORDER — TRIAMCINOLONE ACETONIDE 1 MG/G
OINTMENT TOPICAL 2 TIMES DAILY
Qty: 30 G | Refills: 1 | Status: SHIPPED | OUTPATIENT
Start: 2022-02-20

## 2022-02-21 NOTE — PROGRESS NOTES
Lit Courtney (: 1975) is a 51 y.o. female, established patient, here for evaluation of the following chief complaint(s):   Follow-up (3 month follow up for lab)       ASSESSMENT/PLAN:  Below is the assessment and plan developed based on review of pertinent history, labs, studies, and medications. 1. Rash  -     triamcinolone acetonide (KENALOG) 0.1 % ointment; Apply  to affected area two (2) times a day. use thin layer, Normal, Disp-30 g, R-1  2. Drug interaction  3. Iron deficiency  4. Acquired hypothyroidism  5. History of Jimena-en-Y gastric bypass    Intertrigo. Trial of topical steroids indicated. Hypothyroidism. Stable. Recent medication adjustment. Followed by endocrinology. Iron deficiency. Stable. Patient is  her iron dose from her Synthroid dose for maximum Synthroid function. I have discussed the diagnosis with the patient and the intended plan as seen in the  orders above. The patient understands and agees with the plan. All questions the patient posed were answered. Patient labs and/or xrays were reviewed  Past records were reviewed. Advised patient to call back or return to office if symptoms worsen/change/persist.  Discussed expected course/resolution/complications of diagnosis in detail with patient. Medication risks/benefits/costs/interactions/alternatives discussed with patient  Return in about 3 months (around 2022). SUBJECTIVE/OBJECTIVE:  HPI   Established patient for routine follow-up. Iron deficiency anemia history. Iron therapy has been interfering with thyroid replacement hormone. Medications were split out. Patient had negative Cologuard in August.    Hypothyroidism history. Medication dose adjusted down after endocrine visit on 2022. Patient experiencing a rash involving the skin folds. She has a lot of loose skin following bypass surgery. She is requesting topical steroid. Sleep apnea history. Not using CPAP.   Patient might have had a CPAP following gastric bypass that showed sleep apnea had resolved. Review of Systems     No data recorded     Physical Exam    [INSTRUCTIONS:  \"[x]\" Indicates a positive item  \"[]\" Indicates a negative item  -- DELETE ALL ITEMS NOT EXAMINED]    Constitutional: [x] Appears well-developed and well-nourished [x] No apparent distress      [] Abnormal -     Mental status: [x] Alert and awake  [x] Oriented to person/place/time [x] Able to follow commands    [] Abnormal -     Eyes:   EOM    [x]  Normal    [] Abnormal -   Sclera  [x]  Normal    [] Abnormal -          Discharge [x]  None visible   [] Abnormal -     HENT: [x] Normocephalic, atraumatic  [] Abnormal -   [x] Mouth/Throat: Mucous membranes are moist    External Ears [x] Normal  [] Abnormal -    Neck: [x] No visualized mass [] Abnormal -     Pulmonary/Chest: [x] Respiratory effort normal   [x] No visualized signs of difficulty breathing or respiratory distress        [] Abnormal -      Musculoskeletal:   [] Normal gait with no signs of ataxia         [x] Normal range of motion of neck        [] Abnormal -     Neurological:        [x] No Facial Asymmetry (Cranial nerve 7 motor function) (limited exam due to video visit)          [x] No gaze palsy        [] Abnormal -          Skin:        [x] No significant exanthematous lesions or discoloration noted on facial skin         [] Abnormal -            Psychiatric:       [x] Normal Affect [] Abnormal -        [x] No Hallucinations    Other pertinent observable physical exam findings:-            Brando Espinoza, was evaluated through a synchronous (real-time) audio-video encounter. The patient (or guardian if applicable) is aware that this is a billable service, which includes applicable co-pays. Verbal consent to proceed has been obtained.  The visit was conducted pursuant to the emergency declaration under the [facility]5 waiver authority and the Coronavirus Preparedness and Response Supplemental Appropriations Act. Patient identification was verified, and a caregiver was present when appropriate. The patient was located at home in a state where the provider was licensed to provide care. An electronic signature was used to authenticate this note.   -- Chapin Avalos MD

## 2022-03-19 PROBLEM — R79.82 CRP ELEVATED: Status: ACTIVE | Noted: 2017-03-28

## 2022-03-19 PROBLEM — E78.5 HYPERLIPIDEMIA: Status: ACTIVE | Noted: 2021-04-16

## 2022-03-19 PROBLEM — M10.9 GOUT: Status: ACTIVE | Noted: 2021-04-16

## 2022-03-19 PROBLEM — K76.0 STEATOSIS OF LIVER: Status: ACTIVE | Noted: 2020-11-10

## 2022-03-20 PROBLEM — K21.9 GASTROESOPHAGEAL REFLUX DISEASE WITHOUT ESOPHAGITIS: Status: ACTIVE | Noted: 2020-11-05

## 2022-05-06 DIAGNOSIS — E03.9 HYPOTHYROIDISM, UNSPECIFIED TYPE: Primary | ICD-10-CM

## 2022-05-06 RX ORDER — LEVOTHYROXINE SODIUM 25 UG/1
25 TABLET ORAL
Qty: 90 TABLET | Refills: 3 | Status: SHIPPED | OUTPATIENT
Start: 2022-05-06 | End: 2022-05-11 | Stop reason: ALTCHOICE

## 2022-05-11 DIAGNOSIS — E03.9 HYPOTHYROIDISM, UNSPECIFIED TYPE: Primary | ICD-10-CM

## 2022-05-11 RX ORDER — LEVOTHYROXINE SODIUM 200 UG/1
200 TABLET ORAL
Qty: 90 TABLET | Refills: 3 | Status: SHIPPED | OUTPATIENT
Start: 2022-05-11

## 2022-05-18 ENCOUNTER — TELEPHONE (OUTPATIENT)
Dept: SURGERY | Age: 47
End: 2022-05-18

## 2022-05-25 ENCOUNTER — OFFICE VISIT (OUTPATIENT)
Dept: SURGERY | Age: 47
End: 2022-05-25
Payer: MEDICAID

## 2022-05-25 VITALS
HEART RATE: 60 BPM | TEMPERATURE: 98 F | OXYGEN SATURATION: 97 % | BODY MASS INDEX: 32.71 KG/M2 | DIASTOLIC BLOOD PRESSURE: 82 MMHG | WEIGHT: 191.6 LBS | RESPIRATION RATE: 18 BRPM | SYSTOLIC BLOOD PRESSURE: 137 MMHG | HEIGHT: 64 IN

## 2022-05-25 DIAGNOSIS — E66.01 MORBID OBESITY (HCC): Primary | ICD-10-CM

## 2022-05-25 DIAGNOSIS — Z98.84 S/P GASTRIC BYPASS: ICD-10-CM

## 2022-05-25 PROCEDURE — 99212 OFFICE O/P EST SF 10 MIN: CPT | Performed by: NURSE PRACTITIONER

## 2022-05-25 NOTE — PROGRESS NOTES
HISTORY OF PRESENT ILLNESS  Michael Aburto is a 51 y.o. female with previous Malabsorptive Gastric bypass 10 months ago. . She has lost a total of 55 pounds since surgery. She  has lost 25.2 since the last ov. Body mass index is 32.89 kg/m². Thurl Mutton no nausea and no vomiting . She states that she has some burning RUQ after eating. Last for a few minutes then goes away. . Drinking 74 ounces of water daily. 60-80 protein intake daily. + BM's, some weeks better than others. She is eating more fiber. . Not exercising. Dietary recall -    Breakfast- protein shake, egg and meat  Lunch- skip, yogurt,   Dinner- meat and veg    She is snacking between meals; celery and peanut butter, apple sauce or yogurt . Vitamins:  MVI : yes  Calcium : yes  B-Vit 12: yes  Vit D: No          Ms. Soares has a reminder for a \"due or due soon\" health maintenance. I have asked that she contact her primary care provider for follow-up on this health maintenance. COMORBIDITY     SLEEP APNEA                 NO        GERD  (req.meds)            NO  HYPERLIPIDEMIA            NO  HYPERTENSION              NO         DIABETES                         NO           Current Outpatient Medications:     levothyroxine (SYNTHROID) 200 mcg tablet, Take 1 Tablet by mouth Daily (before breakfast). , Disp: 90 Tablet, Rfl: 3    triamcinolone acetonide (KENALOG) 0.1 % ointment, Apply  to affected area two (2) times a day. use thin layer, Disp: 30 g, Rfl: 1    iron,carbonyl-vitamin C (Vitron-C) 65 mg iron- 125 mg TbEC, Take  by mouth., Disp: , Rfl:     multivitamin-min-iron-FA-vit K (Bariatric Multivitamins) 45 mg iron- 800 mcg-120 mcg cap, Take 2 Capsules by mouth., Disp: , Rfl:     cyclobenzaprine (FLEXERIL) 10 mg tablet, Take 1 Tablet by mouth three (3) times daily as needed for Muscle Spasm(s). , Disp: 10 Tablet, Rfl: 0    calcium citrate/vitamin D3 (CALCIUM CITRATE + D PO), Take  by mouth two (2) times a day., Disp: , Rfl:     multivitamin (ONE A DAY) tablet, Take 1 Tablet by mouth daily. Unjury multivitamin, Disp: , Rfl:       Visit Vitals  /82   Pulse 60   Temp 98 °F (36.7 °C) (Oral)   Resp 18   Ht 5' 4\" (1.626 m)   Wt 191 lb 9.6 oz (86.9 kg)   SpO2 97%   BMI 32.89 kg/m²     HPI    Review of Systems   Respiratory: Negative for shortness of breath. Cardiovascular: Negative for chest pain. Gastrointestinal: Positive for abdominal pain. Negative for heartburn, nausea and vomiting. Neurological: Negative for dizziness and headaches. Physical Exam  Constitutional:       Appearance: She is well-developed. HENT:      Mouth/Throat:      Mouth: Mucous membranes are moist.   Cardiovascular:      Rate and Rhythm: Normal rate and regular rhythm. Heart sounds: No murmur heard. No friction rub. No gallop. Pulmonary:      Effort: Pulmonary effort is normal.      Breath sounds: Normal breath sounds. Abdominal:      General: Bowel sounds are normal. There is no distension. Palpations: Abdomen is soft. Tenderness: There is no abdominal tenderness. Comments: No masses or hernias noted   Skin:     General: Skin is warm and dry. Neurological:      Mental Status: She is alert and oriented to person, place, and time. ASSESSMENT and PLAN  Isabel Beltran is a 51 y.o. female with previous Malabsorptive Gastric bypass 10 months ago. . She has lost a total of 55 pounds since surgery. She  has lost 25.2 since the last ov. Body mass index is 32.89 kg/m². Thomas Pickles no nausea and no vomiting . She states that she has some burning RUQ after eating. Last for a few minutes then goes away. . Drinking 74 ounces of water daily. 60-80 protein intake daily. + BM's, some weeks better than others. She is eating more fiber. . Not exercising. Order and abd ultrasound to r/o gallstones. Encouraged to start exercising   Meet with RD. Advised patient regard to diet that is high-protein, low-fat, low-sugar, limited carbohydrates.  Strive for 50-60 grams of protein daily. If having a snack, foods that are protein or fiber rich. . No eating/drinking together, chew foods well, and portion control. Measure meals. Discussed snacking behavior and to [facility] pay attention to behavioral factor and habits. Drink at least 40-64 ounces of water or non-calorie/non-carbonated beverages daily. Continue vitamin regiment daily. Exercise at least 3 days a week with cardiovascular and strength training. Patient to follow up in 3 months. Advised to call office if any questions/concerns. 15 Minutes spent face to face with patient, >50 % of time spent counseling.

## 2022-05-25 NOTE — PATIENT INSTRUCTIONS
Eating Healthy Foods: Care Instructions  Your Care Instructions     Eating healthy foods can help lower your risk for disease. Healthy food gives you energy and keeps your heart strong, your brain active, your muscles working, and your bones strong. A healthy diet includes a variety of foods from the basic food groups: grains, vegetables, fruits, milk and milk products, and meat and beans. Some people may eat more of their favorite foods from only one food group and, as a result, miss getting the nutrients they need. So, it is important to pay attention not only to what you eat but also to what you are missing from your diet. You can eat a healthy, balanced diet by making a few small changes. Follow-up care is a key part of your treatment and safety. Be sure to make and go to all appointments, and call your doctor if you are having problems. It's also a good idea to know your test results and keep a list of the medicines you take. How can you care for yourself at home? Look at what you eat  · Keep a food diary for a week or two and record everything you eat or drink. Track the number of servings you eat from each food group. · For a balanced diet every day, eat a variety of:  ? 6 or more ounce-equivalents of grains, such as cereals, breads, crackers, rice, or pasta, every day. An ounce-equivalent is 1 slice of bread, 1 cup of ready-to-eat cereal, or ½ cup of cooked rice, cooked pasta, or cooked cereal.  ? 2½ cups of vegetables, especially:  § Dark-green vegetables such as broccoli and spinach. § Orange vegetables such as carrots and sweet potatoes. § Dry beans (such as hill and kidney beans) and peas (such as lentils). ? 2 cups of fresh, frozen, or canned fruit. A small apple or 1 banana or orange equals 1 cup. ? 3 cups of nonfat or low-fat milk, yogurt, or other milk products. ? 5½ ounces of meat and beans, such as chicken, fish, lean meat, beans, nuts, and seeds.  One egg, 1 tablespoon of peanut butter, ½ ounce nuts or seeds, or ¼ cup of cooked beans equals 1 ounce of meat. · Learn how to read food labels for serving sizes and ingredients. Fast-food and convenience-food meals often contain few or no fruits or vegetables. Make sure you eat some fruits and vegetables to make the meal more nutritious. · Look at your food diary. For each food group, add up what you have eaten and then divide the total by the number of days. This will give you an idea of how much you are eating from each food group. See if you can find some ways to change your diet to make it more healthy. Start small  · Do not try to make dramatic changes to your diet all at once. You might feel that you are missing out on your favorite foods and then be more likely to fail. · Start slowly, and gradually change your habits. Try some of the following:  ? Use whole wheat bread instead of white bread. ? Use nonfat or low-fat milk instead of whole milk. ? Eat brown rice instead of white rice, and eat whole wheat pasta instead of white-flour pasta. ? Try low-fat cheeses and low-fat yogurt. ? Add more fruits and vegetables to meals and have them for snacks. ? Add lettuce, tomato, cucumber, and onion to sandwiches. ? Add fruit to yogurt and cereal.  Enjoy food  · You can still eat your favorite foods. You just may need to eat less of them. If your favorite foods are high in fat, salt, and sugar, limit how often you eat them, but do not cut them out entirely. · Eat a wide variety of foods. Make healthy choices when eating out  · The type of restaurant you choose can help you make healthy choices. Even fast-food chains are now offering more low-fat or healthier choices on the menu. · Choose smaller portions, or take half of your meal home. · When eating out, try:  ? A veggie pizza with a whole wheat crust or grilled chicken (instead of sausage or pepperoni).   ? Pasta with roasted vegetables, grilled chicken, or marinara sauce instead of cream sauce. ? A vegetable wrap or grilled chicken wrap. ? Broiled or poached food instead of fried or breaded items. Make healthy choices easy  · Buy packaged, prewashed, ready-to-eat fresh vegetables and fruits, such as baby carrots, salad mixes, and chopped or shredded broccoli and cauliflower. · Buy packaged, presliced fruits, such as melon or pineapple. · Choose 100% fruit or vegetable juice instead of soda. Limit juice intake to 4 to 6 oz (½ to ¾ cup) a day. · Blend low-fat yogurt, fruit juice, and canned or frozen fruit to make a smoothie for breakfast or a snack. Where can you learn more? Go to http://www.neri.com/  Enter T756 in the search box to learn more about \"Eating Healthy Foods: Care Instructions. \"  Current as of: September 8, 2021               Content Version: 13.2  © 2006-2022 [employer]. Care instructions adapted under license by [employer] (which disclaims liability or warranty for this information). If you have questions about a medical condition or this instruction, always ask your healthcare professional. [place] [address] any warranty or liability for your use of this information.

## 2022-05-25 NOTE — PROGRESS NOTES
1. Have you been to the ER, urgent care clinic since your last visit? Hospitalized since your last visit? no    2. Have you seen or consulted any other health care providers outside of the [address] [place] [address] since your last visit? Include any pap smears or colon screening.  no

## 2022-07-12 ENCOUNTER — HOSPITAL ENCOUNTER (EMERGENCY)
Age: 47
Discharge: HOME | End: 2022-07-12
Attending: EMERGENCY MEDICINE
Payer: MEDICAID

## 2022-07-12 VITALS
HEIGHT: 65 IN | BODY MASS INDEX: 31.65 KG/M2 | TEMPERATURE: 98 F | RESPIRATION RATE: 18 BRPM | WEIGHT: 190 LBS | OXYGEN SATURATION: 97 % | DIASTOLIC BLOOD PRESSURE: 77 MMHG | HEART RATE: 52 BPM | SYSTOLIC BLOOD PRESSURE: 129 MMHG

## 2022-07-12 DIAGNOSIS — R00.1 BRADYCARDIA: Primary | ICD-10-CM

## 2022-07-12 LAB
ALBUMIN SERPL-MCNC: 3.3 G/DL (ref 3.5–5)
ALBUMIN/GLOB SERPL: 1 {RATIO} (ref 1.1–2.2)
ALP SERPL-CCNC: 71 U/L (ref 45–117)
ALT SERPL-CCNC: 30 U/L (ref 12–78)
ANION GAP SERPL CALC-SCNC: 2 MMOL/L (ref 5–15)
AST SERPL-CCNC: 18 U/L (ref 15–37)
ATRIAL RATE: 50 BPM
BASOPHILS # BLD: 0 K/UL (ref 0–0.1)
BASOPHILS NFR BLD: 1 % (ref 0–1)
BILIRUB SERPL-MCNC: 0.4 MG/DL (ref 0.2–1)
BUN SERPL-MCNC: 14 MG/DL (ref 6–20)
BUN/CREAT SERPL: 17 (ref 12–20)
CALCIUM SERPL-MCNC: 8.1 MG/DL (ref 8.5–10.1)
CALCULATED P AXIS, ECG09: 9 DEGREES
CALCULATED R AXIS, ECG10: 1 DEGREES
CALCULATED T AXIS, ECG11: 4 DEGREES
CHLORIDE SERPL-SCNC: 112 MMOL/L (ref 97–108)
CO2 SERPL-SCNC: 27 MMOL/L (ref 21–32)
CREAT SERPL-MCNC: 0.81 MG/DL (ref 0.55–1.02)
DIAGNOSIS, 93000: NORMAL
DIFFERENTIAL METHOD BLD: NORMAL
EOSINOPHIL # BLD: 0.1 K/UL (ref 0–0.4)
EOSINOPHIL NFR BLD: 1 % (ref 0–7)
ERYTHROCYTE [DISTWIDTH] IN BLOOD BY AUTOMATED COUNT: 13 % (ref 11.5–14.5)
GLOBULIN SER CALC-MCNC: 3.4 G/DL (ref 2–4)
GLUCOSE SERPL-MCNC: 86 MG/DL (ref 65–100)
HCT VFR BLD AUTO: 38.8 % (ref 35–47)
HGB BLD-MCNC: 12.4 G/DL (ref 11.5–16)
IMM GRANULOCYTES # BLD AUTO: 0 K/UL (ref 0–0.04)
IMM GRANULOCYTES NFR BLD AUTO: 0 % (ref 0–0.5)
LYMPHOCYTES # BLD: 2.5 K/UL (ref 0.8–3.5)
LYMPHOCYTES NFR BLD: 40 % (ref 12–49)
MAGNESIUM SERPL-MCNC: 2.3 MG/DL (ref 1.6–2.4)
MCH RBC QN AUTO: 30.2 PG (ref 26–34)
MCHC RBC AUTO-ENTMCNC: 32 G/DL (ref 30–36.5)
MCV RBC AUTO: 94.4 FL (ref 80–99)
MONOCYTES # BLD: 0.7 K/UL (ref 0–1)
MONOCYTES NFR BLD: 11 % (ref 5–13)
NEUTS SEG # BLD: 2.9 K/UL (ref 1.8–8)
NEUTS SEG NFR BLD: 47 % (ref 32–75)
NRBC # BLD: 0 K/UL (ref 0–0.01)
NRBC BLD-RTO: 0 PER 100 WBC
P-R INTERVAL, ECG05: 142 MS
PLATELET # BLD AUTO: 189 K/UL (ref 150–400)
PMV BLD AUTO: 11.7 FL (ref 8.9–12.9)
POTASSIUM SERPL-SCNC: 3.7 MMOL/L (ref 3.5–5.1)
PROT SERPL-MCNC: 6.7 G/DL (ref 6.4–8.2)
Q-T INTERVAL, ECG07: 478 MS
QRS DURATION, ECG06: 76 MS
QTC CALCULATION (BEZET), ECG08: 435 MS
RBC # BLD AUTO: 4.11 M/UL (ref 3.8–5.2)
SODIUM SERPL-SCNC: 141 MMOL/L (ref 136–145)
TROPONIN-HIGH SENSITIVITY: 5 NG/L (ref 0–51)
TSH SERPL DL<=0.05 MIU/L-ACNC: 2.9 UIU/ML (ref 0.36–3.74)
VENTRICULAR RATE, ECG03: 50 BPM
WBC # BLD AUTO: 6.2 K/UL (ref 3.6–11)

## 2022-07-12 PROCEDURE — 83735 ASSAY OF MAGNESIUM: CPT

## 2022-07-12 PROCEDURE — 80053 COMPREHEN METABOLIC PANEL: CPT

## 2022-07-12 PROCEDURE — 36415 COLL VENOUS BLD VENIPUNCTURE: CPT

## 2022-07-12 PROCEDURE — 84484 ASSAY OF TROPONIN QUANT: CPT

## 2022-07-12 PROCEDURE — 99284 EMERGENCY DEPT VISIT MOD MDM: CPT

## 2022-07-12 PROCEDURE — 93005 ELECTROCARDIOGRAM TRACING: CPT

## 2022-07-12 PROCEDURE — 85025 COMPLETE CBC W/AUTO DIFF WBC: CPT

## 2022-07-12 PROCEDURE — 84443 ASSAY THYROID STIM HORMONE: CPT

## 2022-07-12 NOTE — ED NOTES
Pt. Presents to ED today after being sent by outpatient surgical center for bradycardia. Patient getting hysterectomy and after administration of propofol patients HR decreased to the 20s. Per EMS report the patients HR recovered to the 50s where she is upon arrival to the ED.  PT. Alert and oriented x4 with no complaints and no symptoms. Pt. Placed in position of comfort with call bell in reach.

## 2022-07-12 NOTE — ED PROVIDER NOTES
EMERGENCY DEPARTMENT HISTORY AND PHYSICAL EXAM      Date: 7/12/2022  Patient Name: Olman Cage    History of Presenting Illness     Chief Complaint   Patient presents with    Slow Heart Rate       History Provided By: Patient    HPI: Olman Cage, 51 y.o. female with PMHx as noted below presents the emergency department for evaluation of bradycardia. Per report, patient became bradycardic in the 20s to 30s during a outpatient dilation and curettage procedure from her OB/GYN. Due to the low heart rate was referred to the emergency department for evaluation. Otherwise patient states she is entirely asymptomatic at this time. Only medication she takes is Synthroid. Per EMS on their arrival heart rate has been in the 50s and has remained so with normal blood pressures. Pt denies any other alleviating or exacerbating factors. Additionally, pt specifically denies any recent fever, chills, headache, nausea, vomiting, abdominal pain, CP, SOB, lightheadedness, dizziness, numbness, weakness, BLE swelling, heart palpitations, urinary sxs, diarrhea, constipation, melena, hematochezia, cough, or congestion. PCP: Manolo Barksdale MD    Current Outpatient Medications   Medication Sig Dispense Refill    levothyroxine (SYNTHROID) 200 mcg tablet Take 1 Tablet by mouth Daily (before breakfast). 90 Tablet 3    triamcinolone acetonide (KENALOG) 0.1 % ointment Apply  to affected area two (2) times a day. use thin layer 30 g 1    iron,carbonyl-vitamin C (Vitron-C) 65 mg iron- 125 mg TbEC Take  by mouth.  multivitamin-min-iron-FA-vit K (Bariatric Multivitamins) 45 mg iron- 800 mcg-120 mcg cap Take 2 Capsules by mouth.  cyclobenzaprine (FLEXERIL) 10 mg tablet Take 1 Tablet by mouth three (3) times daily as needed for Muscle Spasm(s). 10 Tablet 0    calcium citrate/vitamin D3 (CALCIUM CITRATE + D PO) Take  by mouth two (2) times a day.  multivitamin (ONE A DAY) tablet Take 1 Tablet by mouth daily. Unjury multivitamin         Past History     Past Medical History:  Past Medical History:   Diagnosis Date    ADD (attention deficit disorder)     Anxiety     Anxiety     Carpal tunnel syndrome     left    Chronic pain     COVID-19 ruled out     COVID-19 vaccine series completed     4/5/21,4/28/21    DJD (degenerative joint disease)     Endocrine disease     hypothyroidism    H. PYLORI INFECTION     treated with triple therapy in Fall 2008    Hypertension     Sleep apnea     Unspecified hypothyroidism        Past Surgical History:  Past Surgical History:   Procedure Laterality Date    HX GYN      Bartholin cyst removal    HX LAP GASTRIC BYPASS  07/14/2021    by Dr. Beatris V[facility] at [facility]    [address] HX TUBAL LIGATION  2005    MO EXCIS BARTHOLIN GLAND/CYST         Family History:  Family History   Problem Relation Age of Onset    Hypertension Mother     Hypertension Father     Stroke Father         age 79    Thyroid Disease Sister         hypothyroidism    Arthritis-rheumatoid Sister     Anesth Problems Neg Hx        Social History:  Social History     Tobacco Use    Smoking status: Never Smoker    Smokeless tobacco: Never Used   Vaping Use    Vaping Use: Never used   Substance Use Topics    Alcohol use: No    Drug use: No       Allergies: Allergies   Allergen Reactions    Darvocet A500 [Propoxyphene N-Acetaminophen] Hives    Sertraline Other (comments)     Intolerance         Review of Systems   Review of Systems  Constitutional: Negative for fever, chills, and fatigue. HENT: Negative for congestion, sore throat, rhinorrhea, sneezing and neck stiffness   Eyes: Negative for discharge and redness. Respiratory: Negative for  shortness of breath, wheezing   Cardiovascular: Negative for chest pain, palpitations   Gastrointestinal: Negative for nausea, vomiting, abdominal pain, constipation, diarrhea and blood in stool.    Genitourinary: Negative for dysuria, hematuria, flank pain, decreased urine volume, discharge,   Musculoskeletal: Negative for myalgias or joint pain . Skin: Negative for rash or lesions . Neurological: Negative weakness, light-headedness, numbness and headaches. Physical Exam   Physical Exam    GENERAL: alert and oriented, no acute distress  EYES: PEERL, No injection, discharge or icterus. ENT: Mucous membranes pink and moist.  NECK: Supple  LUNGS: Airway patent. Non-labored respirations. Breath sounds clear with good air entry bilaterally. HEART: Bradycardic, regular rhythm. No peripheral edema  ABDOMEN: Non-distended and non-tender, without guarding or rebound. SKIN:  warm, dry  MSK/EXTREMITIES: Without swelling, tenderness or deformity, symmetric with normal ROM  NEUROLOGICAL: Alert, oriented      Diagnostic Study Results     Labs -     Recent Results (from the past 12 hour(s))   CBC WITH AUTOMATED DIFF    Collection Time: 07/12/22 11:32 AM   Result Value Ref Range    WBC 6.2 3.6 - 11.0 K/uL    RBC 4.11 3.80 - 5.20 M/uL    HGB 12.4 11.5 - 16.0 g/dL    HCT 38.8 35.0 - 47.0 %    MCV 94.4 80.0 - 99.0 FL    MCH 30.2 26.0 - 34.0 PG    MCHC 32.0 30.0 - 36.5 g/dL    RDW 13.0 11.5 - 14.5 %    PLATELET 554 385 - 480 K/uL    MPV 11.7 8.9 - 12.9 FL    NRBC 0.0 0  WBC    ABSOLUTE NRBC 0.00 0.00 - 0.01 K/uL    NEUTROPHILS 47 32 - 75 %    LYMPHOCYTES 40 12 - 49 %    MONOCYTES 11 5 - 13 %    EOSINOPHILS 1 0 - 7 %    BASOPHILS 1 0 - 1 %    IMMATURE GRANULOCYTES 0 0.0 - 0.5 %    ABS. NEUTROPHILS 2.9 1.8 - 8.0 K/UL    ABS. LYMPHOCYTES 2.5 0.8 - 3.5 K/UL    ABS. MONOCYTES 0.7 0.0 - 1.0 K/UL    ABS. EOSINOPHILS 0.1 0.0 - 0.4 K/UL    ABS. BASOPHILS 0.0 0.0 - 0.1 K/UL    ABS. IMM.  GRANS. 0.0 0.00 - 0.04 K/UL    DF AUTOMATED     METABOLIC PANEL, COMPREHENSIVE    Collection Time: 07/12/22 11:32 AM   Result Value Ref Range    Sodium 141 136 - 145 mmol/L    Potassium 3.7 3.5 - 5.1 mmol/L    Chloride 112 (H) 97 - 108 mmol/L    CO2 27 21 - 32 mmol/L    Anion gap 2 (L) 5 - 15 mmol/L    Glucose 86 65 - 100 mg/dL    BUN 14 6 - 20 MG/DL    Creatinine 0.81 0.55 - 1.02 MG/DL    BUN/Creatinine ratio 17 12 - 20      GFR est AA >60 >60 ml/min/1.73m2    GFR est non-AA >60 >60 ml/min/1.73m2    Calcium 8.1 (L) 8.5 - 10.1 MG/DL    Bilirubin, total 0.4 0.2 - 1.0 MG/DL    ALT (SGPT) 30 12 - 78 U/L    AST (SGOT) 18 15 - 37 U/L    Alk. phosphatase 71 45 - 117 U/L    Protein, total 6.7 6.4 - 8.2 g/dL    Albumin 3.3 (L) 3.5 - 5.0 g/dL    Globulin 3.4 2.0 - 4.0 g/dL    A-G Ratio 1.0 (L) 1.1 - 2.2     MAGNESIUM    Collection Time: 07/12/22 11:32 AM   Result Value Ref Range    Magnesium 2.3 1.6 - 2.4 mg/dL   TROPONIN-HIGH SENSITIVITY    Collection Time: 07/12/22 11:32 AM   Result Value Ref Range    Troponin-High Sensitivity 5 0 - 51 ng/L   TSH 3RD GENERATION    Collection Time: 07/12/22  1:19 PM   Result Value Ref Range    TSH 2.90 0.36 - 3.74 uIU/mL       Radiologic Studies -   No orders to display     CT Results  (Last 48 hours)    None        CXR Results  (Last 48 hours)    None            Medical Decision Making     IPhan MD am the first provider for this patient and am the attending of record for this patient encounter. I reviewed the vital signs, available nursing notes, past medical history, past surgical history, family history and social history. Vital Signs-Reviewed the patient's vital signs. Patient Vitals for the past 12 hrs:   Pulse Resp BP SpO2   07/12/22 1407 (!) 52 18 129/77 97 %   07/12/22 1307 (!) 46 14 127/87 97 %   07/12/22 1237 (!) 53 15 -- 100 %   07/12/22 1213 (!) 48 14 (!) 134/90 98 %   07/12/22 1158 61 22 -- 93 %   07/12/22 1145 (!) 53 16 126/82 100 %   07/12/22 1130 (!) 56 17 (!) 140/83 99 %       EKG interpretation: (Preliminary)  Rhythm: Sinus bradycardia. Rate (approx.): 50; Axis: normal; P wave: normal; QRS interval: normal ; ST/T wave: normal;  was interpreted by Saturnino Cross MD,ED Provider.       Records Reviewed: Nursing Notes and Old Medical Records    Provider Notes (Medical Decision Making): On presentation, the patient is well appearing, in no acute distress kusum on arrival but otherwise normal vital signs. Based on my history and exam the differential diagnosis for this patient includes heart block, arrhyhtmia, ACS, hypothyroid etc.  Labs reassuring. Monitored for several hour with HR 40-60's. Remained HDS/normotensive and entirely asx so is stable for d/c with outpatient cards follow up. ED Course:   Initial assessment performed. The patients presenting problems have been discussed, and they are in agreement with the care plan formulated and outlined with them. I have encouraged them to ask questions as they arise throughout their visit. Monae OSMAN Natopamela's  results have been reviewed with her. She has been counseled regarding her diagnosis. She verbally conveys understanding and agreement of the signs, symptoms, diagnosis, treatment and prognosis and additionally agrees to follow up as recommended with Dr. Hawk Hamilton MD in 24 - 48 hours. She also agrees with the care-plan and conveys that all of her questions have been answered. I have also put together some discharge instructions for her that include: 1) educational information regarding their diagnosis, 2) how to care for their diagnosis at home, as well a 3) list of reasons why they would want to return to the ED prior to their follow-up appointment, should their condition change. Disposition:  home    PLAN:  1. Discharge Medication List as of 7/12/2022  2:48 PM        2.    Follow-up Information     Follow up With Specialties Details Why Contact Info    Hawk Hamilton MD Adolescent Medicine [facility] Schedule an appointment as soon as possible for a visit in 2 days  [id number] [place] [address] [address]emio Ryan  [phone]      [facility] [place] EM[facility]ENCY DEPT Emergency Medicine  If symptoms worsen [address] [place] [id number]  [phone]    Damaris Carvajal MD Cardio Vascular Surgery, Cardiovascular Disease Physician Schedule an appointment as soon as possible for a visit in 2 days  [address]  [id number]  [address] [phone]          Return to ED if worse     Diagnosis     Clinical Impression:   1. Bradycardia        Please note that this dictation was completed with Dragon, computer voice recognition software. Quite often unanticipated grammatical, syntax, homophones, and other interpretive errors are inadvertently transcribed by the computer software. Please disregard these errors. Additionally, please excuse any errors that have escaped final proofreading.

## 2022-07-12 NOTE — ED NOTES
Patient discharged by Dr. mAee Tidwell. Patient provided with discharge instructions Rx and instructions on follow up care. Patient out of ED ambulatory accompanied by family.

## 2023-01-31 NOTE — PROGRESS NOTES
HISTORY OF PRESENT ILLNESS  I was in the office while conducting this encounter. Consent:  She and/or her healthcare decision maker is aware that this patient-initiated Telehealth encounter is a billable service, with coverage as determined by her insurance carrier. She is aware that she may receive a bill and has provided verbal consent to proceed: Yes    This virtual visit was conducted via [email or web address]. Pursuant to the emergency declaration under the [facility], [facility] waiver authority and the [employer] Act, this Virtual  Visit was conducted to reduce the patient's risk of exposure to COVID-19 and provide continuity of care for an established patient. Services were provided through a video synchronous discussion virtually to substitute for in-person clinic visit. Due to this being a TeleHealth evaluation, many elements of the physical examination are unable to be assessed. Total Time: minutes: 11-20 minutes. Verona Garcia is a 51 y.o. female with previous Malabsorptive Gastric bypass 4 months ago. She has lost a total of 73 pounds since surgery. She  has lost 30 lbs since the last ov. Body mass index is 36.94 kg/m². Zo Edwards She has some vomiting with chicken and eggs. She does well with pork, beef and seafood. Nausea occasionally with certain foods. Denies Acid reflux/heartburn. . Drinking  64 ounces of water daily. 60 protein intake daily. +  BM's. Pt is walking down the hallway at work  for exercise. Dietary recall -    Breakfast- shake  Lunch- steamed shrimp  Dinner- shrimp or fish. She is snacking between meals; grapes. .      Vitamins:  MVI : yes  Calcium : yes  B-Vit 12: yes  Vit D: Yes          Ms. Soares has a reminder for a \"due or due soon\" health maintenance. I have asked that she contact her primary care provider for follow-up on this health maintenance.         COMORBIDITY     SLEEP APNEA eICU Physician Virtual/Remote Brief Evaluation Note      Telephone call RN  Patient was very agitated climbing out of bed.  Just received telephone is still difficult to control   Chart reviewed, patient observed, discussed with RN   Still quite restless  Bilateral soft wrist restraints ordered for patient safety      JOS Weaver MD  [facility] Attending  [phone]    This report has been created through the use of M-Modal dictation software. Typographical and content errors may occur with this process. While efforts are made to detect and correct such errors, in some cases errors will persist. For this reason, wording in this document should be considered in the proper context and not strictly verbatim     NO        GERD  (req.meds)            NO  HYPERLIPIDEMIA            NO  HYPERTENSION              NO         DIABETES                         NO           Current Outpatient Medications:     iron,carbonyl-vitamin C (Vitron-C) 65 mg iron- 125 mg TbEC, Take  by mouth., Disp: , Rfl:     multivitamin-min-iron-FA-vit K (Bariatric Multivitamins) 45 mg iron- 800 mcg-120 mcg cap, Take 2 Capsules by mouth., Disp: , Rfl:     cyclobenzaprine (FLEXERIL) 10 mg tablet, Take 1 Tablet by mouth three (3) times daily as needed for Muscle Spasm(s). , Disp: 10 Tablet, Rfl: 0    calcium citrate/vitamin D3 (CALCIUM CITRATE + D PO), Take  by mouth two (2) times a day., Disp: , Rfl:     levothyroxine (SYNTHROID) 200 mcg tablet, Take 1 Tablet by mouth Daily (before breakfast). , Disp: 90 Tablet, Rfl: 3    omeprazole (PRILOSEC) 40 mg capsule, Take 1 Capsule by mouth daily. AFTER SURGERY, Disp: 30 Capsule, Rfl: 1    multivitamin (ONE A DAY) tablet, Take 1 Tablet by mouth daily. Unjury multivitamin, Disp: , Rfl:     triamcinolone acetonide (KENALOG) 0.1 % ointment, Apply  to affected area two (2) times a day. use thin layer (Patient not taking: Reported on 11/10/2021), Disp: 30 g, Rfl: 0      Visit Vitals  Ht 5' 5\" (1.651 m)   Wt 222 lb (100.7 kg)   LMP 10/20/2021 (Exact Date)   BMI 36.94 kg/m²     HPI    Review of Systems   Respiratory: Negative for shortness of breath. Cardiovascular: Negative for chest pain. Gastrointestinal: Negative for abdominal pain, heartburn, nausea and vomiting. Neurological: Negative for dizziness. Physical Exam  HENT:      Mouth/Throat:      Mouth: Mucous membranes are moist.   Pulmonary:      Effort: No respiratory distress. Abdominal:      Tenderness: There is no abdominal tenderness. Neurological:      Mental Status: She is alert and oriented to person, place, and time.          ASSESSMENT and PLAN    Climmie Kehr is a 51 y.o. female with previous Malabsorptive Gastric bypass 4 months ago. She has lost a total of 73 pounds since surgery. She  has lost 30 lbs since the last ov. Body mass index is 36.94 kg/m². Denae Dougherty She has some vomiting with chicken and eggs. Nausea occasionally with foods. Denies Acid reflux/heartburn. . Drinking  64 ounces of water daily. 60 protein intake daily. +  BM's. Pt is walking down the hallway at work  for exercise. Zofran refilled. We discussed avoiding the foods for now that do not agree with her. Focus on the foods that she tolerates well. Advised patient regard to diet that is high-protein, low-fat, low-sugar, limited carbohydrates. Strive for 50-60 grams of protein daily. If having a snack, foods that are protein or fiber rich. . No eating/drinking together, chew foods well, and portion control. Measure meals. Discussed snacking behavior and to [facility] pay attention to behavioral factor and habits. Drink at least 40-64 ounces of water or non-calorie/non-carbonated beverages daily. Continue vitamin regiment daily. Exercise at least 3 days a week with cardiovascular and strength training. Patient to follow up in 4 months.ago. . Advised to call office if any questions/concerns. 15 Minutes spent face to face with patient, >50 % of time spent counseling.

## 2023-03-30 ENCOUNTER — OFFICE VISIT (OUTPATIENT)
Dept: SURGERY | Age: 48
End: 2023-03-30
Payer: MEDICAID

## 2023-03-30 VITALS
TEMPERATURE: 98.7 F | DIASTOLIC BLOOD PRESSURE: 85 MMHG | HEIGHT: 65 IN | SYSTOLIC BLOOD PRESSURE: 134 MMHG | RESPIRATION RATE: 20 BRPM | WEIGHT: 180 LBS | OXYGEN SATURATION: 99 % | BODY MASS INDEX: 29.99 KG/M2 | HEART RATE: 63 BPM

## 2023-03-30 DIAGNOSIS — R53.83 FATIGUE, UNSPECIFIED TYPE: ICD-10-CM

## 2023-03-30 DIAGNOSIS — E55.9 VITAMIN D DEFICIENCY: ICD-10-CM

## 2023-03-30 DIAGNOSIS — E66.01 MORBID OBESITY (HCC): Primary | ICD-10-CM

## 2023-03-30 DIAGNOSIS — Z98.84 S/P GASTRIC BYPASS: ICD-10-CM

## 2023-03-30 DIAGNOSIS — K91.2 POSTSURGICAL NONABSORPTION: ICD-10-CM

## 2023-03-30 DIAGNOSIS — D64.9 ANEMIA, UNSPECIFIED TYPE: ICD-10-CM

## 2023-03-30 DIAGNOSIS — E53.8 VITAMIN B12 DEFICIENCY: ICD-10-CM

## 2023-03-30 PROCEDURE — 3075F SYST BP GE 130 - 139MM HG: CPT | Performed by: NURSE PRACTITIONER

## 2023-03-30 PROCEDURE — 3079F DIAST BP 80-89 MM HG: CPT | Performed by: NURSE PRACTITIONER

## 2023-03-30 PROCEDURE — 99212 OFFICE O/P EST SF 10 MIN: CPT | Performed by: NURSE PRACTITIONER

## 2023-03-30 RX ORDER — LANOLIN ALCOHOL/MO/W.PET/CERES
500 CREAM (GRAM) TOPICAL DAILY
COMMUNITY

## 2023-03-30 NOTE — PROGRESS NOTES
Identified pt with two pt identifiers (name and ). Reviewed chart in preparation for visit and have obtained necessary documentation. Taylor Palma is a 49 y.o. female  Chief Complaint   Patient presents with    Surgical Follow-up     1 year 8 months s/p lap gastric bypass down 66.5 pounds, lost 11.5 pounds      Visit Vitals  /85 (BP 1 Location: Right upper arm, BP Patient Position: Sitting, BP Cuff Size: Large adult)   Pulse 63   Temp 98.7 °F (37.1 °C)   Resp 20   Ht 5' 5\" (1.651 m)   Wt 180 lb (81.6 kg)   SpO2 99%   BMI 29.95 kg/m²       1. Have you been to the ER, urgent care clinic since your last visit? Hospitalized since your last visit? yes ED after D&C    2. Have you seen or consulted any other health care providers outside of the [address] [place] [address] since your last visit? Include any pap smears or colon screening.  No  \

## 2023-03-30 NOTE — PROGRESS NOTES
Amber Suh (:  1975) is a 49 y.o. F,established, here for evaluation of the following chief complaint(s):  Surgical Follow-up (1 year 8 months s/p lap gastric bypass down 66.5 pounds, lost 11.5 pounds )        SUBJECTIVE/OBJECTIVE:    HPI:  Amber Suh is a 49 y.o. female with previous Malabsorptive Gastric bypass surgery on 1 year and 6 months. . She has lost a total of 66.5 pounds since surgery. She has lost 11.5 since the last ov. Body mass index is 29.95 kg/m². .  . Denies Acid reflux/heartburn. . Drinking  64+ ounces of water daily. 65 protein intake daily. + BM's. She has stopped exercising. She is having trouble exercising due to being tired. She had a DNC due to bleeding. She feels that she has fallen into old eating habits and bad eating   Dietary recall -    Breakfast- over night oats, shake  Lunch- skip, protein bar  Dinner- BBQ chicken, baked potatoe    She  is snacking between meals; chips, ice cream, cookies. .      Vitamins:  MVI : yes  Calcium : yes  B-Vit 12: yes  Vit D: yes          Ms. Soares has a reminder for a \"due or due soon\" health maintenance. I have asked that she contact her primary care provider for follow-up on this health maintenance. COMORBIDITY     SLEEP APNEA                 no        GERD  (req.meds)            no  HYPERLIPIDEMIA            no  HYPERTENSION              no         DIABETES                         no         Current Outpatient Medications:     cyanocobalamin (Vitamin B-12) 500 mcg tablet, Take 500 mcg by mouth daily. , Disp: , Rfl:     levothyroxine (SYNTHROID) 200 mcg tablet, Take 1 Tablet by mouth Daily (before breakfast). , Disp: 90 Tablet, Rfl: 3    iron,carbonyl-vitamin C (Vitron-C) 65 mg iron- 125 mg TbEC, Take  by mouth., Disp: , Rfl:     multivitamin-min-iron-FA-vit K (Bariatric Multivitamins) 45 mg iron- 800 mcg-120 mcg cap, Take 2 Capsules by mouth., Disp: , Rfl:     cyclobenzaprine (FLEXERIL) 10 mg tablet, Take 1 Tablet by mouth three (3) times daily as needed for Muscle Spasm(s). , Disp: 10 Tablet, Rfl: 0    calcium citrate/vitamin D3 (CALCIUM CITRATE + D PO), Take  by mouth two (2) times a day., Disp: , Rfl:     triamcinolone acetonide (KENALOG) 0.1 % ointment, Apply  to affected area two (2) times a day. use thin layer (Patient not taking: Reported on 3/30/2023), Disp: 30 g, Rfl: 1    multivitamin (ONE A DAY) tablet, Take 1 Tablet by mouth daily. Unjury multivitamin (Patient not taking: Reported on 3/30/2023), Disp: , Rfl:       Visit Vitals  /85 (BP 1 Location: Right upper arm, BP Patient Position: Sitting, BP Cuff Size: Large adult)   Pulse 63   Temp 98.7 °F (37.1 °C)   Resp 20   Ht 5' 5\" (1.651 m)   Wt 180 lb (81.6 kg)   SpO2 99%   BMI 29.95 kg/m²      Review of Systems   Constitutional:  Positive for fatigue. Respiratory:  Negative for shortness of breath. Cardiovascular:  Negative for chest pain. Neurological:  Negative for dizziness and headaches. ASSESSMENT/PLAN:    Physical Exam  Constitutional:       Appearance: She is well-developed. HENT:      Mouth/Throat:      Mouth: Mucous membranes are moist.   Cardiovascular:      Rate and Rhythm: Normal rate and regular rhythm. Pulmonary:      Effort: Pulmonary effort is normal.      Breath sounds: Normal breath sounds. Abdominal:      General: Bowel sounds are normal. There is no distension. Palpations: Abdomen is soft. Tenderness: There is no abdominal tenderness. Comments: No masses or hernias noted   Skin:     General: Skin is warm and dry. Neurological:      Mental Status: She is alert and oriented to person, place, and time. 1. Morbid obesity ([address]  -     CBC W/O DIFF; Future  -     METABOLIC PANEL, COMPREHENSIVE; Future  -     VITAMIN D, 25 HYDROXY; Future  -     VITAMIN B12; Future  -     IRON; Future  2. BMI 29.0-29.9,adult  -     CBC W/O DIFF; Future  -     METABOLIC PANEL, COMPREHENSIVE;  Future  -     VITAMIN D, 25 HYDROXY; Future  -     VITAMIN B12; Future  -     IRON; Future  3. S/P gastric bypass  -     CBC W/O DIFF; Future  -     METABOLIC PANEL, COMPREHENSIVE; Future  -     VITAMIN D, 25 HYDROXY; Future  -     VITAMIN B12; Future  -     IRON; Future  4. Vitamin D deficiency  -     CBC W/O DIFF; Future  -     METABOLIC PANEL, COMPREHENSIVE; Future  -     VITAMIN D, 25 HYDROXY; Future  -     VITAMIN B12; Future  -     IRON; Future  5. Vitamin B12 deficiency  -     CBC W/O DIFF; Future  -     METABOLIC PANEL, COMPREHENSIVE; Future  -     VITAMIN D, 25 HYDROXY; Future  -     VITAMIN B12; Future  -     IRON; Future  6. Anemia, unspecified type  -     CBC W/O DIFF; Future  -     METABOLIC PANEL, COMPREHENSIVE; Future  -     VITAMIN D, 25 HYDROXY; Future  -     VITAMIN B12; Future  -     IRON; Future  7. Postsurgical nonabsorption  -     CBC W/O DIFF; Future  -     METABOLIC PANEL, COMPREHENSIVE; Future  -     VITAMIN D, 25 HYDROXY; Future  -     VITAMIN B12; Future  -     IRON; Future  8. Fatigue, unspecified type  -     CBC W/O DIFF; Future  -     METABOLIC PANEL, COMPREHENSIVE; Future  -     VITAMIN D, 25 HYDROXY; Future  -     VITAMIN B12; Future  -     IRON; Future          Advised patient regard to diet that is high-protein, low-fat, low-sugar, limited carbohydrates. Strive for 50-60 grams of protein daily. If having a snack, foods that are protein or fiber rich. . No eating/drinking together, chew foods well, and portion control. Measure meals. Discussed snacking behavior and to [facility] pay attention to behavioral factor and habits. Drink at least 40-64 ounces of water or non-calorie/non-carbonated beverages daily. Continue vitamin regiment daily. Exercise at least 3 days a week with cardiovascular and strength training. Patient to follow up in 6 months. Advised to call office if any questions/concerns. Will check nutrition labs. WE discussed eating full solid foods for each meal to help with snacking and hunger.      15 Minutes spent face to face with patient, >50 % of time spent counseling. No orders of the defined types were placed in this encounter. An electronic signature was used to authenticate this note.     --[unfilled]

## 2023-03-31 LAB
25(OH)D3+25(OH)D2 SERPL-MCNC: 24.6 NG/ML (ref 30–100)
ALBUMIN SERPL-MCNC: 4.3 G/DL (ref 3.8–4.8)
ALBUMIN/GLOB SERPL: 1.8 {RATIO} (ref 1.2–2.2)
ALP SERPL-CCNC: 71 IU/L (ref 44–121)
ALT SERPL-CCNC: 23 IU/L (ref 0–32)
AST SERPL-CCNC: 28 IU/L (ref 0–40)
BILIRUB SERPL-MCNC: 0.3 MG/DL (ref 0–1.2)
BUN SERPL-MCNC: 15 MG/DL (ref 6–24)
BUN/CREAT SERPL: 19 (ref 9–23)
CALCIUM SERPL-MCNC: 8.9 MG/DL (ref 8.7–10.2)
CHLORIDE SERPL-SCNC: 105 MMOL/L (ref 96–106)
CO2 SERPL-SCNC: 25 MMOL/L (ref 20–29)
CREAT SERPL-MCNC: 0.79 MG/DL (ref 0.57–1)
EGFRCR SERPLBLD CKD-EPI 2021: 92 ML/MIN/1.73
ERYTHROCYTE [DISTWIDTH] IN BLOOD BY AUTOMATED COUNT: 12.7 % (ref 11.7–15.4)
GLOBULIN SER CALC-MCNC: 2.4 G/DL (ref 1.5–4.5)
GLUCOSE SERPL-MCNC: 83 MG/DL (ref 70–99)
HCT VFR BLD AUTO: 38.2 % (ref 34–46.6)
HGB BLD-MCNC: 12.3 G/DL (ref 11.1–15.9)
IRON SERPL-MCNC: 26 UG/DL (ref 27–159)
MCH RBC QN AUTO: 28.5 PG (ref 26.6–33)
MCHC RBC AUTO-ENTMCNC: 32.2 G/DL (ref 31.5–35.7)
MCV RBC AUTO: 89 FL (ref 79–97)
PLATELET # BLD AUTO: 212 X10E3/UL (ref 150–450)
POTASSIUM SERPL-SCNC: 4.4 MMOL/L (ref 3.5–5.2)
PROT SERPL-MCNC: 6.7 G/DL (ref 6–8.5)
RBC # BLD AUTO: 4.31 X10E6/UL (ref 3.77–5.28)
SODIUM SERPL-SCNC: 141 MMOL/L (ref 134–144)
VIT B12 SERPL-MCNC: 1011 PG/ML (ref 232–1245)
WBC # BLD AUTO: 5.7 X10E3/UL (ref 3.4–10.8)

## 2023-03-31 RX ORDER — ERGOCALCIFEROL 1.25 MG/1
50000 CAPSULE ORAL
Qty: 12 CAPSULE | Refills: 0 | Status: SHIPPED | OUTPATIENT
Start: 2023-03-31

## 2023-03-31 NOTE — PROGRESS NOTES
Silvio Hendrix,   Your iron is just slightly low. It is not low enough to warrant iron infusions. It may be taking your body and iron levels some time to adjust after your D&C. Your Vit D level is low and that could also cause fatigue. I am going to send prescription stregth Vit D to pharmacy. Take as directed. I hope this helps.    Take Care,   Monika Kelley

## 2023-04-06 ENCOUNTER — OFFICE VISIT (OUTPATIENT)
Dept: CARDIOLOGY CLINIC | Age: 48
End: 2023-04-06
Payer: MEDICAID

## 2023-04-06 ENCOUNTER — CLINICAL SUPPORT (OUTPATIENT)
Dept: CARDIOLOGY CLINIC | Age: 48
End: 2023-04-06

## 2023-04-06 PROCEDURE — 3074F SYST BP LT 130 MM HG: CPT | Performed by: SPECIALIST

## 2023-04-06 PROCEDURE — 99214 OFFICE O/P EST MOD 30 MIN: CPT | Performed by: SPECIALIST

## 2023-04-06 PROCEDURE — 3079F DIAST BP 80-89 MM HG: CPT | Performed by: SPECIALIST

## 2023-05-09 ENCOUNTER — TELEPHONE (OUTPATIENT)
Age: 48
End: 2023-05-09

## 2023-05-09 NOTE — TELEPHONE ENCOUNTER
Called pt. Verified patient's identity with two identifiers. Notified pt of holter results and message. Patient verbalized understanding and denied further questions or concerns.

## 2023-05-09 NOTE — TELEPHONE ENCOUNTER
Dr. Griffiths Other-  Patient's holter monitor results are on your desk for review. No future appointments.

## 2023-05-09 NOTE — TELEPHONE ENCOUNTER
4/23 48hr holter, no sig tachy or venessa, rare pac, pvcs    Nothing to worry about, needs no fup or further testing.  Looks great

## 2023-06-01 RX ORDER — LEVOTHYROXINE SODIUM 0.2 MG/1
TABLET ORAL
Qty: 90 TABLET | Refills: 0 | OUTPATIENT
Start: 2023-06-01

## 2023-06-01 RX ORDER — LEVOTHYROXINE SODIUM 0.03 MG/1
TABLET ORAL
Qty: 30 TABLET | Refills: 0 | OUTPATIENT
Start: 2023-06-01

## 2023-06-19 ENCOUNTER — TELEPHONE (OUTPATIENT)
Age: 48
End: 2023-06-19

## 2023-06-19 DIAGNOSIS — E03.9 HYPOTHYROIDISM, UNSPECIFIED TYPE: Primary | ICD-10-CM

## 2023-06-19 DIAGNOSIS — E03.9 HYPOTHYROIDISM, UNSPECIFIED TYPE: ICD-10-CM

## 2023-06-19 NOTE — TELEPHONE ENCOUNTER
Pt called with w/o feeling \"terrible. \" Pt stated that she need to have her levothyroxine rx switched to something else. Pt was made aware that her last labs were over a year ago, therefore labs will be needed. She then stated that her labs genually will result within ranged will then made aware that Dr Paulo Nicole will be out of the office until July 5th and was encouraged again to complete her labs.

## 2023-06-21 LAB
T4 FREE SERPL-MCNC: 1.06 NG/DL (ref 0.82–1.77)
TSH SERPL DL<=0.005 MIU/L-ACNC: 13 UIU/ML (ref 0.45–4.5)

## 2023-06-22 ENCOUNTER — TELEPHONE (OUTPATIENT)
Age: 48
End: 2023-06-22

## 2023-06-22 DIAGNOSIS — E03.9 ACQUIRED HYPOTHYROIDISM: Primary | ICD-10-CM

## 2023-06-22 RX ORDER — LEVOTHYROXINE SODIUM 200 UG/1
200 TABLET ORAL DAILY
Qty: 90 TABLET | Refills: 0 | Status: SHIPPED | OUTPATIENT
Start: 2023-06-22

## 2023-06-22 RX ORDER — LIOTHYRONINE SODIUM 5 UG/1
5 TABLET ORAL DAILY
Qty: 90 TABLET | Refills: 0 | Status: SHIPPED | OUTPATIENT
Start: 2023-06-22

## 2023-06-22 NOTE — TELEPHONE ENCOUNTER
We had the following [employer] exchange:    Sounds good. I'll put you down and see you then and sent the prescriptions to [facility] now.  -------------------------------------------------------------------------------------------------------------------  I put an order directly into the [employer] system to repeat your labs in the 1-2 weeks prior to your next visit so just ask for the order under my name and make a note on your calendar to have these done at that time. This order was also put directly into the [employer] portal.  Go under menu and my record and scroll down to upcoming tests and procedures and you are welcome to print this off or bring a copy of the order using the [employer] antoine on your phone to the lab. Thanks!    ===View-only below this line===      ----- Message -----       From:Alma Magana       Sent:6/22/2023  4:48 PM EDT         To:User Message Message List    Subject:lab results    I use [facility] pharmacy on [address] Yes that date and time works perfectly. Thank you so much      ----- Message -----       From:Dr. Manan Aldana       Sent:6/22/2023  4:42 PM EDT         To:Alma Magana    Subject:lab results    [id number] [address] If this is the case, I'm happy to see you back. I have found that patients with gastric bypass surgery do best on brand thyroid hormone to avoid the 15-20% variability that comes with generic levothyroxine. Also, I'm happy to try some T3 in the form of cytomel (liothyronine) to see if you feel better on the combination of T4 and T3. I recommend we try brand unithroid 200 mcg tabs and take cytomel 5 mcg 1 tab daily of each at least 30 min before food and coffee and at least 4 hours apart from multivitamins, calcium and iron. Let me know where you would like these sent and I can send them to your pharmacy today.   In terms of switching to see me, you will need to see me in my [place] office as I'm only at [facility] one day a week on Wednesdays and don't have any

## 2023-07-07 DIAGNOSIS — R63.5 WEIGHT GAIN: ICD-10-CM

## 2023-07-07 DIAGNOSIS — E66.01 MORBID (SEVERE) OBESITY DUE TO EXCESS CALORIES (HCC): Primary | ICD-10-CM

## 2023-07-07 DIAGNOSIS — Z98.84 S/P GASTRIC BYPASS: ICD-10-CM

## 2023-07-26 RX ORDER — ERGOCALCIFEROL 1.25 MG/1
CAPSULE ORAL
Qty: 12 CAPSULE | Refills: 0 | OUTPATIENT
Start: 2023-07-26

## 2023-08-10 ENCOUNTER — OFFICE VISIT (OUTPATIENT)
Age: 48
End: 2023-08-10
Payer: MEDICAID

## 2023-08-10 VITALS
OXYGEN SATURATION: 96 % | RESPIRATION RATE: 16 BRPM | WEIGHT: 188.4 LBS | TEMPERATURE: 98.9 F | HEART RATE: 76 BPM | SYSTOLIC BLOOD PRESSURE: 136 MMHG | BODY MASS INDEX: 31.39 KG/M2 | DIASTOLIC BLOOD PRESSURE: 85 MMHG | HEIGHT: 65 IN

## 2023-08-10 DIAGNOSIS — E66.09 CLASS 1 OBESITY DUE TO EXCESS CALORIES WITH SERIOUS COMORBIDITY AND BODY MASS INDEX (BMI) OF 31.0 TO 31.9 IN ADULT: ICD-10-CM

## 2023-08-10 DIAGNOSIS — Z13.1 SCREENING FOR DIABETES MELLITUS (DM): ICD-10-CM

## 2023-08-10 DIAGNOSIS — E78.2 MIXED HYPERLIPIDEMIA: ICD-10-CM

## 2023-08-10 DIAGNOSIS — E03.9 ACQUIRED HYPOTHYROIDISM: ICD-10-CM

## 2023-08-10 DIAGNOSIS — K76.0 STEATOSIS OF LIVER: ICD-10-CM

## 2023-08-10 DIAGNOSIS — E66.09 CLASS 1 OBESITY DUE TO EXCESS CALORIES WITH SERIOUS COMORBIDITY AND BODY MASS INDEX (BMI) OF 31.0 TO 31.9 IN ADULT: Primary | ICD-10-CM

## 2023-08-10 DIAGNOSIS — Z98.84 STATUS POST GASTRIC BYPASS FOR OBESITY: ICD-10-CM

## 2023-08-10 DIAGNOSIS — I10 PRIMARY HYPERTENSION: ICD-10-CM

## 2023-08-10 DIAGNOSIS — E61.1 IRON DEFICIENCY: ICD-10-CM

## 2023-08-10 PROCEDURE — 3075F SYST BP GE 130 - 139MM HG: CPT | Performed by: FAMILY MEDICINE

## 2023-08-10 PROCEDURE — 3079F DIAST BP 80-89 MM HG: CPT | Performed by: FAMILY MEDICINE

## 2023-08-10 PROCEDURE — 99214 OFFICE O/P EST MOD 30 MIN: CPT | Performed by: FAMILY MEDICINE

## 2023-08-10 RX ORDER — PHENTERMINE HYDROCHLORIDE 15 MG/1
15 CAPSULE ORAL EVERY MORNING
Qty: 30 CAPSULE | Refills: 0 | Status: SHIPPED | OUTPATIENT
Start: 2023-08-10 | End: 2023-09-09

## 2023-08-10 ASSESSMENT — PATIENT HEALTH QUESTIONNAIRE - PHQ9
SUM OF ALL RESPONSES TO PHQ9 QUESTIONS 1 & 2: 0
2. FEELING DOWN, DEPRESSED OR HOPELESS: 0
SUM OF ALL RESPONSES TO PHQ QUESTIONS 1-9: 0
1. LITTLE INTEREST OR PLEASURE IN DOING THINGS: 0
SUM OF ALL RESPONSES TO PHQ QUESTIONS 1-9: 0

## 2023-08-10 NOTE — PROGRESS NOTES
[facility] Progress Note: Initial Physician Visit     Belen Duron is a 49 y.o. female who is here for medical screening for entering the [facility].   The patient denies any disease state that requires protein restriction. CC: class 1 Obesity    Referred by Francesca May reason referred       Weight History  I personally reviewed the Medical Screening Maximus Bakes completed by patient and scanned into media section of chart. It includes duration of their obesity, maximum weight, goal weight  all of which give the context of their obesity AND a Family History of their obesity. At [employer] grad was 150 lbs  Gained most of the weight after her last pregnancy, she had hypothyroid          Weight loss History  I personally reviewed the [address] completed by the patient and scanned into media section of chart. It includes number of weight loss attempts, the weight loss program that patients was most successful using, and if they have any hx of anorectic medication use, including OTC supplements. Her pcp worked with her before the GBP  No other programs  No meds prescribed      GBP at 297 lbs 2021  Lowest after was 170 lbs now  188 lbs          Significant Medical History  Past Medical History:   Diagnosis Date    ADD (attention deficit disorder)     Anemia     Anxiety     Anxiety     Carpal tunnel syndrome     left    Chronic pain     COVID-19 ruled out     COVID-19 vaccine series completed     4/5/21,4/28/21    DJD (degenerative joint disease)     Endocrine disease     hypothyroidism    Hypertension     Sleep apnea     Unspecified hypothyroidism      Patient's medicactions that may contribute  none  Plan to become pregant within 6 months ost BTL    I personally reviewed the Medical Screening Maximus Bakes completed by the patient and scanned into media section of chart.   This allows me to assess associated symptoms that are

## 2023-08-11 LAB
ALBUMIN SERPL-MCNC: 4.1 G/DL (ref 3.9–4.9)
ALBUMIN/GLOB SERPL: 1.6 {RATIO} (ref 1.2–2.2)
ALP SERPL-CCNC: 66 IU/L (ref 44–121)
ALT SERPL-CCNC: 10 IU/L (ref 0–32)
AST SERPL-CCNC: 18 IU/L (ref 0–40)
BILIRUB SERPL-MCNC: 0.2 MG/DL (ref 0–1.2)
BUN SERPL-MCNC: 13 MG/DL (ref 6–24)
BUN/CREAT SERPL: 19 (ref 9–23)
CALCIUM SERPL-MCNC: 8.8 MG/DL (ref 8.7–10.2)
CHLORIDE SERPL-SCNC: 106 MMOL/L (ref 96–106)
CHOLEST SERPL-MCNC: 125 MG/DL (ref 100–199)
CO2 SERPL-SCNC: 23 MMOL/L (ref 20–29)
CREAT SERPL-MCNC: 0.68 MG/DL (ref 0.57–1)
EGFRCR SERPLBLD CKD-EPI 2021: 107 ML/MIN/1.73
ERYTHROCYTE [DISTWIDTH] IN BLOOD BY AUTOMATED COUNT: 13.1 % (ref 11.7–15.4)
GLOBULIN SER CALC-MCNC: 2.6 G/DL (ref 1.5–4.5)
GLUCOSE SERPL-MCNC: 82 MG/DL (ref 70–99)
HBA1C MFR BLD: 5 % (ref 4.8–5.6)
HCT VFR BLD AUTO: 35.4 % (ref 34–46.6)
HDLC SERPL-MCNC: 58 MG/DL
HGB BLD-MCNC: 11.3 G/DL (ref 11.1–15.9)
INSULIN SERPL-ACNC: 3.4 UIU/ML (ref 2.6–24.9)
IRON SERPL-MCNC: 15 UG/DL (ref 27–159)
LDLC SERPL CALC-MCNC: 53 MG/DL (ref 0–99)
MCH RBC QN AUTO: 28 PG (ref 26.6–33)
MCHC RBC AUTO-ENTMCNC: 31.9 G/DL (ref 31.5–35.7)
MCV RBC AUTO: 88 FL (ref 79–97)
PLATELET # BLD AUTO: 236 X10E3/UL (ref 150–450)
POTASSIUM SERPL-SCNC: 4.4 MMOL/L (ref 3.5–5.2)
PROT SERPL-MCNC: 6.7 G/DL (ref 6–8.5)
RBC # BLD AUTO: 4.04 X10E6/UL (ref 3.77–5.28)
SODIUM SERPL-SCNC: 142 MMOL/L (ref 134–144)
TRIGL SERPL-MCNC: 66 MG/DL (ref 0–149)
TSH SERPL DL<=0.005 MIU/L-ACNC: 2.49 UIU/ML (ref 0.45–4.5)
VIT B12 SERPL-MCNC: 585 PG/ML (ref 232–1245)
VLDLC SERPL CALC-MCNC: 14 MG/DL (ref 5–40)
WBC # BLD AUTO: 6.1 X10E3/UL (ref 3.4–10.8)

## 2023-08-15 DIAGNOSIS — E03.9 ACQUIRED HYPOTHYROIDISM: ICD-10-CM

## 2023-08-16 ENCOUNTER — TELEPHONE (OUTPATIENT)
Age: 48
End: 2023-08-16

## 2023-08-16 NOTE — TELEPHONE ENCOUNTER
Called patient to return Agent Partner message to schedule appointment with Dr Clif Jackson.  Patient stated someone called her previously and scheduled her for 9/12/23 at 2:15pm

## 2023-08-20 LAB
T3 SERPL-MCNC: 81 NG/DL (ref 71–180)
T4 FREE SERPL-MCNC: 2.28 NG/DL (ref 0.82–1.77)
TSH SERPL DL<=0.005 MIU/L-ACNC: 0.29 UIU/ML (ref 0.45–4.5)

## 2023-08-22 ENCOUNTER — OFFICE VISIT (OUTPATIENT)
Age: 48
End: 2023-08-22
Payer: MEDICAID

## 2023-08-22 VITALS
WEIGHT: 183.8 LBS | HEIGHT: 65 IN | HEART RATE: 75 BPM | BODY MASS INDEX: 30.62 KG/M2 | DIASTOLIC BLOOD PRESSURE: 71 MMHG | SYSTOLIC BLOOD PRESSURE: 137 MMHG

## 2023-08-22 DIAGNOSIS — D50.9 IRON DEFICIENCY ANEMIA, UNSPECIFIED IRON DEFICIENCY ANEMIA TYPE: ICD-10-CM

## 2023-08-22 DIAGNOSIS — E55.9 VITAMIN D DEFICIENCY: ICD-10-CM

## 2023-08-22 DIAGNOSIS — E03.9 ACQUIRED HYPOTHYROIDISM: Primary | ICD-10-CM

## 2023-08-22 PROCEDURE — 3075F SYST BP GE 130 - 139MM HG: CPT | Performed by: INTERNAL MEDICINE

## 2023-08-22 PROCEDURE — 99215 OFFICE O/P EST HI 40 MIN: CPT | Performed by: INTERNAL MEDICINE

## 2023-08-22 PROCEDURE — 3078F DIAST BP <80 MM HG: CPT | Performed by: INTERNAL MEDICINE

## 2023-08-22 RX ORDER — LEVOTHYROXINE SODIUM 200 UG/1
200 TABLET ORAL DAILY
Qty: 30 TABLET | Refills: 11 | Status: SHIPPED | OUTPATIENT
Start: 2023-08-22

## 2023-08-22 RX ORDER — ERGOCALCIFEROL 1.25 MG/1
50000 CAPSULE ORAL WEEKLY
Qty: 4 CAPSULE | Refills: 11 | Status: SHIPPED | OUTPATIENT
Start: 2023-08-22

## 2023-08-22 RX ORDER — LIOTHYRONINE SODIUM 5 UG/1
5 TABLET ORAL DAILY
Qty: 30 TABLET | Refills: 11 | Status: SHIPPED | OUTPATIENT
Start: 2023-08-22

## 2023-08-22 NOTE — PATIENT INSTRUCTIONS
1) TSH is a thyroid test.  Your level is 0.29 which is slightly low and below goal of 0.45-2.0 and your free T4 is high at 2.2. The TSH goes opposite of your thyroid dose and suggests your dose of unithroid is slightly more than you need. I will decrease your dose to 1 tab on Mon-Sat and 1/2 tab on Sunday and stay on liothyronine (cytomel) 1 tab 7 days a week. [facility] 2) Please start once a week vitamin D called Ergocalciferol 50,000 units on Sundays. This will be ready for  at the pharmacy today. If you miss a dose of vitamin D, it's okay to take it as soon as you remember or just double up the next week. The goal is 4 caps per month. 3) Split the iron to 1 tab twice daily instead of 2 tabs together to help with absorption but be sure to take your first dose 4 hours apart from the unithroid and cytomel. 4) Plan on repeating your labs in 2 months. I will send you a message through [employer] with your lab results.

## 2023-08-22 NOTE — PROGRESS NOTES
Chief Complaint   Patient presents with    Thyroid Problem     PCP and pharmacy confirmed      History of Present Illness: Julieta Ward is a 51 y.o. female here for follow up of thyroid. This is her first visit with me after transferring from Dr. Comfort Buckley but had seen her previously back in 2010 and 2011. Underwent gastric bypass surgery in 2021. Since our mychart exchange in June 2023, has been taking the unithroid and cytomel 1 tab daily in the morning with just water and waits at least before food and coffee. Hasn't missed any doses. Her MVI and iron are in the evening but takes 2 tabs of vitron-C together in the evening and I advised her to split to 1 tab bid to help with absorption. Not on any GERD meds. Still has some fatigue especially in the early afternoon but it's better than on the generic levothyroxine. Still having a lot of achy joints and muscles. Works an  for a [employer] and is in charge of the breakfast set up. Bowels are regular. Tends to stay cold all the time. Used to be on weekly vitamin D but has been off this the past 2 months. No chest pain or palpitations or tremors. Some brittle nails and dry skin that is unchanged. No hair loss. She was referred to Dr. Jayden Sosa for medical weight loss and saw her on 8/10/23 and was prescribed phentermine 15 mg caps and took 1 daily for about 3 days and then felt more jittery so she ended up stopping this.       Current Outpatient Medications   Medication Sig    Multiple Vitamins-Minerals (BARIATRIC MULTIVITAMINS/IRON PO) Take 2 capsules by mouth    UNITHROID 200 MCG tablet Take 1 tablet by mouth Daily BRAND MEDICALLY NECESSARY--in addition to cytomel--replaces levothyroxine    liothyronine (CYTOMEL) 5 MCG tablet Take 1 tablet by mouth daily In addition to unithroid--replaces levothyroxine    Iron-Vitamin C (VITRON-C)  MG TABS Take 2 tablets by mouth every evening    triamcinolone (KENALOG) 0.1 % ointment Apply topically 2

## 2023-09-12 ENCOUNTER — OFFICE VISIT (OUTPATIENT)
Age: 48
End: 2023-09-12

## 2023-09-12 VITALS
TEMPERATURE: 98.5 F | HEART RATE: 75 BPM | BODY MASS INDEX: 30.27 KG/M2 | DIASTOLIC BLOOD PRESSURE: 88 MMHG | RESPIRATION RATE: 18 BRPM | HEIGHT: 65 IN | WEIGHT: 181.7 LBS | SYSTOLIC BLOOD PRESSURE: 136 MMHG | OXYGEN SATURATION: 98 %

## 2023-09-12 DIAGNOSIS — E78.2 MIXED HYPERLIPIDEMIA: ICD-10-CM

## 2023-09-12 DIAGNOSIS — I10 PRIMARY HYPERTENSION: ICD-10-CM

## 2023-09-12 DIAGNOSIS — K76.0 STEATOSIS OF LIVER: ICD-10-CM

## 2023-09-12 DIAGNOSIS — E61.1 IRON DEFICIENCY: ICD-10-CM

## 2023-09-12 DIAGNOSIS — E03.9 ACQUIRED HYPOTHYROIDISM: ICD-10-CM

## 2023-09-12 DIAGNOSIS — E66.09 CLASS 1 OBESITY DUE TO EXCESS CALORIES WITH SERIOUS COMORBIDITY AND BODY MASS INDEX (BMI) OF 31.0 TO 31.9 IN ADULT: ICD-10-CM

## 2023-09-12 DIAGNOSIS — E66.09 CLASS 1 OBESITY DUE TO EXCESS CALORIES WITH SERIOUS COMORBIDITY AND BODY MASS INDEX (BMI) OF 30.0 TO 30.9 IN ADULT: Primary | ICD-10-CM

## 2023-09-12 RX ORDER — PHENTERMINE HYDROCHLORIDE 15 MG/1
15 CAPSULE ORAL EVERY MORNING
Qty: 30 CAPSULE | Refills: 0 | Status: SHIPPED | OUTPATIENT
Start: 2023-09-12 | End: 2023-10-12

## 2023-09-12 RX ORDER — VALACYCLOVIR HYDROCHLORIDE 1 G/1
1000 TABLET, FILM COATED ORAL DAILY
COMMUNITY
Start: 2023-09-06

## 2023-09-12 ASSESSMENT — PATIENT HEALTH QUESTIONNAIRE - PHQ9
SUM OF ALL RESPONSES TO PHQ QUESTIONS 1-9: 0
SUM OF ALL RESPONSES TO PHQ QUESTIONS 1-9: 0
SUM OF ALL RESPONSES TO PHQ9 QUESTIONS 1 & 2: 0
SUM OF ALL RESPONSES TO PHQ QUESTIONS 1-9: 0
1. LITTLE INTEREST OR PLEASURE IN DOING THINGS: 0
SUM OF ALL RESPONSES TO PHQ QUESTIONS 1-9: 0
2. FEELING DOWN, DEPRESSED OR HOPELESS: 0

## 2023-09-13 ENCOUNTER — CLINICAL DOCUMENTATION (OUTPATIENT)
Age: 48
End: 2023-09-13

## 2023-09-13 NOTE — PROGRESS NOTES
Phentermine 15 mg capsules APPROVED by [employer]. Authorization # [id number]    9/13/2023 - 12/13/2023    Member ID # [id number]    Patient notified.

## 2023-09-13 NOTE — PROGRESS NOTES
Prior Authorization for Phentermine 15 mg sent to [facility]. Key # [id number]    ID # [id number]    Awaiting determination.

## 2023-10-17 ENCOUNTER — OFFICE VISIT (OUTPATIENT)
Age: 48
End: 2023-10-17
Payer: MEDICAID

## 2023-10-17 VITALS
HEIGHT: 65 IN | HEART RATE: 67 BPM | WEIGHT: 184 LBS | OXYGEN SATURATION: 98 % | RESPIRATION RATE: 18 BRPM | SYSTOLIC BLOOD PRESSURE: 138 MMHG | TEMPERATURE: 98.2 F | BODY MASS INDEX: 30.66 KG/M2 | DIASTOLIC BLOOD PRESSURE: 84 MMHG

## 2023-10-17 DIAGNOSIS — E66.09 CLASS 1 OBESITY DUE TO EXCESS CALORIES WITH SERIOUS COMORBIDITY AND BODY MASS INDEX (BMI) OF 30.0 TO 30.9 IN ADULT: Primary | ICD-10-CM

## 2023-10-17 DIAGNOSIS — E78.2 MIXED HYPERLIPIDEMIA: ICD-10-CM

## 2023-10-17 DIAGNOSIS — I10 PRIMARY HYPERTENSION: ICD-10-CM

## 2023-10-17 DIAGNOSIS — K76.0 STEATOSIS OF LIVER: ICD-10-CM

## 2023-10-17 DIAGNOSIS — E03.9 ACQUIRED HYPOTHYROIDISM: ICD-10-CM

## 2023-10-17 PROCEDURE — 3074F SYST BP LT 130 MM HG: CPT | Performed by: FAMILY MEDICINE

## 2023-10-17 PROCEDURE — 3078F DIAST BP <80 MM HG: CPT | Performed by: FAMILY MEDICINE

## 2023-10-17 PROCEDURE — 99214 OFFICE O/P EST MOD 30 MIN: CPT | Performed by: FAMILY MEDICINE

## 2023-10-17 RX ORDER — TOPIRAMATE 25 MG/1
25 TABLET ORAL 2 TIMES DAILY
Qty: 60 TABLET | Refills: 2 | Status: SHIPPED | OUTPATIENT
Start: 2023-10-17

## 2023-10-17 ASSESSMENT — PATIENT HEALTH QUESTIONNAIRE - PHQ9
SUM OF ALL RESPONSES TO PHQ QUESTIONS 1-9: 0
2. FEELING DOWN, DEPRESSED OR HOPELESS: 0
1. LITTLE INTEREST OR PLEASURE IN DOING THINGS: 0
SUM OF ALL RESPONSES TO PHQ QUESTIONS 1-9: 0
SUM OF ALL RESPONSES TO PHQ9 QUESTIONS 1 & 2: 0

## 2023-10-18 ENCOUNTER — TELEPHONE (OUTPATIENT)
Age: 48
End: 2023-10-18

## 2023-10-18 RX ORDER — PHENTERMINE HYDROCHLORIDE 15 MG/1
15 CAPSULE ORAL EVERY MORNING
Qty: 30 CAPSULE | Refills: 0 | Status: SHIPPED | OUTPATIENT
Start: 2023-10-18 | End: 2023-11-17

## 2023-10-18 NOTE — TELEPHONE ENCOUNTER
Patient called in regards to prescription that was sent in yesterday to the pharmacy, one medication did not go through. Patient would like a call back.

## 2023-10-18 NOTE — TELEPHONE ENCOUNTER
Returned patient's call. She stated that she got the Topamax, but not the phentermine. I advised the patient that Dr. Mariya Jensen sent the phentermine Rx this morning. The patient verbalized understanding and thanked me for returning her call.

## 2023-10-23 ENCOUNTER — TELEPHONE (OUTPATIENT)
Age: 48
End: 2023-10-23

## 2023-10-23 ENCOUNTER — OFFICE VISIT (OUTPATIENT)
Age: 48
End: 2023-10-23
Payer: MEDICAID

## 2023-10-23 DIAGNOSIS — E66.09 CLASS 1 OBESITY DUE TO EXCESS CALORIES WITH SERIOUS COMORBIDITY AND BODY MASS INDEX (BMI) OF 31.0 TO 31.9 IN ADULT: Primary | ICD-10-CM

## 2023-10-23 PROCEDURE — 97802 MEDICAL NUTRITION INDIV IN: CPT | Performed by: DIETITIAN, REGISTERED

## 2023-10-23 NOTE — PROGRESS NOTES
[address]  [facility]  [address] [place] [address], [address] [place] [address] [address]  Phone: [phone] Fax: [phone]   Nutrition Assessment - Medical Nutrition Therapy   Initial Evaluation         Patient Name: David Nova : 1975   Treatment Diagnosis: Obesity Class 1   Referral Source: Joaquin Nolen MD Start of Care [facility]): 10/23/2023     In time:   1:31pm             Out time:   2:00pm   Total Treatment Time (min):   29 minutes     Gender: female Age: 51 y.o. Ht: 65 in Wt:  184 lb 83 kg   BMI: 30 RMR   Male  RMR Female 1460   Anthropometrics Assessment: Per BMI, pt is considered obese. Past Medical History includes: HTN, YESSENIA, OCD, ADD, Obesity, Hypothyroid, GERD     Pertinent Medications:   Phentermine, Topamax   Biochemical Data:   Hemoglobin A1C   Date Value Ref Range Status   08/10/2023 5.0 4.8 - 5.6 % Final     Comment:              Prediabetes: 5.7 - 6.4           Diabetes: >6.4           Glycemic control for adults with diabetes: <7.0       Lab Results   Component Value Date    CHOL 125 08/10/2023     Lab Results   Component Value Date    TRIG 66 08/10/2023     Lab Results   Component Value Date    HDL 58 08/10/2023     Lab Results   Component Value Date    LDLCALC 53 08/10/2023     Lab Results   Component Value Date    LABVLDL 14 08/10/2023    VLDL 44 (H) 11/10/2020     Lab Results   Component Value Date    CHOLHDLRATIO 4.7 2021      Lab Results   Component Value Date    ALT 10 08/10/2023    AST 18 08/10/2023    ALKPHOS 66 08/10/2023    BILITOT 0.2 08/10/2023     Lab Results   Component Value Date    CREATININE 0.68 08/10/2023     Lab Results   Component Value Date    BUN 13 08/10/2023     No results found for: \"MALBCR\"     Nutrition Diagnosis Obesity R/T excessive energy intake AEB BMI 30. Subjective/Assessment:   Gastric bypass , started at 293# to lowest of 168#.       She admits she never stopped bad eating habits, was noncompliant with

## 2023-12-04 ENCOUNTER — TELEPHONE (OUTPATIENT)
Age: 48
End: 2023-12-04

## 2024-02-06 ENCOUNTER — TELEPHONE (OUTPATIENT)
Age: 49
End: 2024-02-06

## 2024-02-07 DIAGNOSIS — E03.9 ACQUIRED HYPOTHYROIDISM: ICD-10-CM

## 2024-02-07 DIAGNOSIS — E55.9 VITAMIN D DEFICIENCY: ICD-10-CM

## 2024-02-07 DIAGNOSIS — D50.9 IRON DEFICIENCY ANEMIA, UNSPECIFIED IRON DEFICIENCY ANEMIA TYPE: ICD-10-CM

## 2024-02-13 ENCOUNTER — OFFICE VISIT (OUTPATIENT)
Age: 49
End: 2024-02-13
Payer: MEDICAID

## 2024-02-13 VITALS
HEART RATE: 80 BPM | HEIGHT: 65 IN | BODY MASS INDEX: 31.96 KG/M2 | SYSTOLIC BLOOD PRESSURE: 136 MMHG | OXYGEN SATURATION: 98 % | RESPIRATION RATE: 18 BRPM | DIASTOLIC BLOOD PRESSURE: 81 MMHG | WEIGHT: 191.8 LBS | TEMPERATURE: 98.3 F

## 2024-02-13 DIAGNOSIS — E78.2 MIXED HYPERLIPIDEMIA: ICD-10-CM

## 2024-02-13 DIAGNOSIS — I10 PRIMARY HYPERTENSION: ICD-10-CM

## 2024-02-13 DIAGNOSIS — E03.9 ACQUIRED HYPOTHYROIDISM: ICD-10-CM

## 2024-02-13 DIAGNOSIS — E66.09 CLASS 1 OBESITY DUE TO EXCESS CALORIES WITH SERIOUS COMORBIDITY AND BODY MASS INDEX (BMI) OF 31.0 TO 31.9 IN ADULT: Primary | ICD-10-CM

## 2024-02-13 DIAGNOSIS — Z98.84 STATUS POST GASTRIC BYPASS FOR OBESITY: ICD-10-CM

## 2024-02-13 PROCEDURE — 3075F SYST BP GE 130 - 139MM HG: CPT | Performed by: FAMILY MEDICINE

## 2024-02-13 PROCEDURE — 3079F DIAST BP 80-89 MM HG: CPT | Performed by: FAMILY MEDICINE

## 2024-02-13 PROCEDURE — 99214 OFFICE O/P EST MOD 30 MIN: CPT | Performed by: FAMILY MEDICINE

## 2024-02-13 RX ORDER — DICLOFENAC SODIUM 75 MG/1
75 TABLET, DELAYED RELEASE ORAL 2 TIMES DAILY PRN
COMMUNITY
Start: 2023-12-28

## 2024-02-13 NOTE — PROGRESS NOTES
Identified pt with two pt identifiers (name and ). Reviewed chart in preparation for visit and have obtained necessary documentation.    Alma Magana is a 48 y.o. female  Chief Complaint   Patient presents with    Weight Management     Restarting program     /81 (Site: Right Upper Arm, Position: Sitting, Cuff Size: Large Adult)   Pulse 80   Temp 98.3 °F (36.8 °C) (Oral)   Resp 18   Ht 1.651 m (5' 5\")   Wt 87 kg (191 lb 12.8 oz)   LMP 2024 (Exact Date)   SpO2 98%   BMI 31.92 kg/m²     1. Have you been to the ER, urgent care clinic since your last visit?  Hospitalized since your last visit?no    2. Have you seen or consulted any other health care providers outside of the [facility] since your last visit?  Include any pap smears or colon screening. No    BMI - 31.6
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
## 2024-02-14 ENCOUNTER — TELEPHONE (OUTPATIENT)
Age: 49
End: 2024-02-14

## 2024-02-14 NOTE — TELEPHONE ENCOUNTER
Returned patient's call.  Advised her that Dr. Frank sent the Contrave to [facility] b/c they have a special \"deal\" where patients can get the medication for $99/month or less, depending on if insurance covers it.    I will be able to do the PA once Dr. Frank finishes her office note.  I will let her know next week at some point when I get an answer from her insurance.    The patient verbalized understanding of all.

## 2024-02-14 NOTE — TELEPHONE ENCOUNTER
Patient called and stated that the Contrave Rx was sent to a pharmacy in [place]. She would like it sent to the [facility] on [address].

## 2024-02-15 ENCOUNTER — CLINICAL DOCUMENTATION (OUTPATIENT)
Age: 49
End: 2024-02-15

## 2024-02-15 NOTE — PROGRESS NOTES
Prior Authorization for Contrave 8-90 mg tablets sent to [employer] ([employer] via Epic.    Case ID: [id number]    Awaiitng determination.

## 2024-09-04 ENCOUNTER — OFFICE VISIT (OUTPATIENT)
Age: 49
End: 2024-09-04

## 2024-09-04 VITALS
WEIGHT: 191 LBS | DIASTOLIC BLOOD PRESSURE: 91 MMHG | OXYGEN SATURATION: 96 % | HEART RATE: 68 BPM | SYSTOLIC BLOOD PRESSURE: 148 MMHG | BODY MASS INDEX: 31.78 KG/M2 | TEMPERATURE: 98.1 F

## 2024-09-04 DIAGNOSIS — S13.4XXA WHIPLASH INJURY TO NECK, INITIAL ENCOUNTER: Primary | ICD-10-CM

## 2024-09-04 DIAGNOSIS — R03.0 ELEVATED BLOOD PRESSURE READING: ICD-10-CM

## 2024-09-04 DIAGNOSIS — M62.838 MUSCLE SPASM: ICD-10-CM

## 2024-09-04 RX ORDER — CYCLOBENZAPRINE HCL 10 MG
10 TABLET ORAL 3 TIMES DAILY PRN
Qty: 30 TABLET | Refills: 0 | Status: SHIPPED | OUTPATIENT
Start: 2024-09-04 | End: 2024-09-14

## 2024-09-04 RX ORDER — IBUPROFEN 800 MG/1
800 TABLET, FILM COATED ORAL EVERY 8 HOURS PRN
Qty: 60 TABLET | Refills: 0 | Status: SHIPPED | OUTPATIENT
Start: 2024-09-04

## 2024-09-04 NOTE — PROGRESS NOTES
Air with pt's mother.      Social Determinants of Health     Financial Resource Strain: Not on file   Food Insecurity: Not on file   Transportation Needs: Not on file   Physical Activity: Not on file   Stress: Not on file   Social Connections: Not on file   Intimate Partner Violence: Not on file   Housing Stability: Not on file       Past Medical History:   Diagnosis Date   • ADD (attention deficit disorder)    • Anemia    • Anxiety    • Anxiety    • Carpal tunnel syndrome     left   • Chronic pain    • COVID-19 ruled out    • COVID-19 vaccine series completed     4/5/21,4/28/21   • DJD (degenerative joint disease)    • Endocrine disease     hypothyroidism   • Hypertension    • Sleep apnea    • Unspecified hypothyroidism        Past Surgical History:   Procedure Laterality Date   • DILATION AND CURETTAGE OF UTERUS  07/2022   • EXCIS BARTHOLIN GLAND/CYST     • GASTRIC BYPASS SURGERY  07/14/2021    by Dr. Markos Erwin at [facility]   • GYN      Bartholin cyst removal   • TONSILLECTOMY  1988   • TUBAL LIGATION  2005       Prior to Admission medications    Medication Sig Start Date End Date Taking? Authorizing Provider   naltrexone-buPROPion (CONTRAVE) 8-90 MG per extended release tablet Tom 1 po ea day for 7 days, then 1 po bid for 7 days, then 2 in am and 1 in pm for 7 days then 2 po bid ongoing 2/13/24  Yes Mary Anne Frank MD   topiramate (TOPAMAX) 25 MG tablet Take 1 tablet by mouth 2 times daily 10/17/23  Yes Mary Anne Frank MD   valACYclovir (VALTREX) 1 g tablet Take 1 tablet by mouth as needed 9/6/23  Yes Provider, MD Benjy   vitamin D (ERGOCALCIFEROL) 1.25 MG (78534 UT) CAPS capsule Take 1 capsule by mouth once a week 8/22/23  Yes Ignacio Pearson MD   UNITHROID 200 MCG tablet Take 1 tablet by mouth Daily ON MON-SAT AND 1/2 TAB ON SUNDAYBRAND MEDICALLY NECESSARY--in addition to cytomel 8/22/23  Yes Ignacio Pearson MD   liothyronine (CYTOMEL) 5 MCG tablet Take 1 tablet by mouth daily In addition to

## 2024-09-04 NOTE — PATIENT INSTRUCTIONS
for a snack or healthy dessert.  Add lettuce, tomato, cucumber, and onion to sandwiches.  Combine a ready-made pizza crust with low-fat mozzarella cheese and lots of vegetable toppings. Try using tomatoes, squash, spinach, broccoli, carrots, cauliflower, and onions.  Have a variety of cut-up vegetables with a low-fat dip as an appetizer instead of chips and dip.  Sprinkle sunflower seeds or chopped almonds over salads. Or try adding chopped walnuts or almonds to cooked vegetables.  Try some vegetarian meals using beans and peas. Add garbanzo or kidney beans to salads. Make burritos and tacos with mashed lange beans or black beans    © 6279-3320 [employer]. Care instructions adapted under license by [facility]. This care instruction is for use with your licensed healthcare professional. If you have questions about a medical condition or this instruction, always ask your healthcare professional. [employer] disclaims any warranty or liability for your use of this information.  Content Version: 9.4.67910; Last Revised: March 15, 2012                     Low Sodium Diet (2,000 Milligram): Care Instructions  Overview     Limiting sodium can be an important part of managing some health problems.  The most common source of sodium is salt. People get most of the salt in their diet from canned, prepared, and packaged foods. Fast food and restaurant meals also are very high in sodium. Your doctor will probably limit your sodium to less than 2,000 milligrams (mg) a day. This limit counts all the sodium in prepared and packaged foods and any salt you add to your food.  Follow-up care is a key part of your treatment and safety. Be sure to make and go to all appointments, and call your doctor if you are having problems. It's also a good idea to know your test results and keep a list of the medicines you take.  How can you care for yourself at home?  Read food labels  Read labels on cans

## 2024-10-19 RX ORDER — LIOTHYRONINE SODIUM 5 UG/1
TABLET ORAL
Qty: 30 TABLET | Refills: 0 | Status: SHIPPED | OUTPATIENT
Start: 2024-10-19

## 2024-10-19 RX ORDER — LEVOTHYROXINE SODIUM 200 UG/1
TABLET ORAL
Qty: 30 TABLET | Refills: 0 | Status: SHIPPED | OUTPATIENT
Start: 2024-10-19

## 2024-10-20 DIAGNOSIS — E03.9 ACQUIRED HYPOTHYROIDISM: Primary | ICD-10-CM

## 2024-10-20 DIAGNOSIS — D50.9 IRON DEFICIENCY ANEMIA, UNSPECIFIED IRON DEFICIENCY ANEMIA TYPE: ICD-10-CM

## 2024-10-20 DIAGNOSIS — E55.9 VITAMIN D DEFICIENCY: ICD-10-CM

## 2024-10-22 ENCOUNTER — TELEPHONE (OUTPATIENT)
Age: 49
End: 2024-10-22

## 2024-10-22 NOTE — TELEPHONE ENCOUNTER
Please call pt to let her know she has an unread message in [employer]:    Ok sounds good.  Please go and have your labs drawn at your earliest convenience using the order on file under my name at [facility].  Once the results are back, I will offer you a visit in the 1-2 weeks after they return when I have a cancellation.  Thanks!      Previous Messages    ----- Message -----       From:Alma Magana       Sent:10/20/2024 12:03 PM EDT         To:User Message Message List    Subject:refill and appointment    Yes, I do need a appointment.  I had a lapse in medical insurance but can now schedule a appointment.

## 2024-10-23 NOTE — TELEPHONE ENCOUNTER
LVM informing pt Dr. Pearson has sent you a [place] message that you have not yet read. Please read this as soon as possible and if you are unable to get into [place] to read the message then please call me back and I'm happy to read the message to you.

## 2025-02-03 ENCOUNTER — TELEPHONE (OUTPATIENT)
Age: 50
End: 2025-02-03

## 2025-02-03 NOTE — TELEPHONE ENCOUNTER
LM for pt to call and schedule annual bariatric exam.  [employer] message also sent. Letter sent. [facility]

## 2025-04-11 ENCOUNTER — OFFICE VISIT (OUTPATIENT)
Age: 50
End: 2025-04-11
Payer: MEDICAID

## 2025-04-11 VITALS
OXYGEN SATURATION: 98 % | TEMPERATURE: 98.2 F | HEART RATE: 73 BPM | SYSTOLIC BLOOD PRESSURE: 123 MMHG | WEIGHT: 201 LBS | DIASTOLIC BLOOD PRESSURE: 84 MMHG | HEIGHT: 65 IN | RESPIRATION RATE: 20 BRPM | BODY MASS INDEX: 33.49 KG/M2

## 2025-04-11 DIAGNOSIS — R63.5 WEIGHT GAIN: ICD-10-CM

## 2025-04-11 DIAGNOSIS — E03.9 ACQUIRED HYPOTHYROIDISM: Primary | ICD-10-CM

## 2025-04-11 DIAGNOSIS — B00.9 HERPES SIMPLEX: ICD-10-CM

## 2025-04-11 DIAGNOSIS — M25.552 PAIN OF LEFT HIP: ICD-10-CM

## 2025-04-11 DIAGNOSIS — E66.811 CLASS 1 OBESITY DUE TO EXCESS CALORIES WITH SERIOUS COMORBIDITY AND BODY MASS INDEX (BMI) OF 30.0 TO 30.9 IN ADULT: ICD-10-CM

## 2025-04-11 DIAGNOSIS — F41.9 ANXIETY: ICD-10-CM

## 2025-04-11 DIAGNOSIS — D64.9 ANEMIA, UNSPECIFIED TYPE: ICD-10-CM

## 2025-04-11 DIAGNOSIS — E66.09 CLASS 1 OBESITY DUE TO EXCESS CALORIES WITH SERIOUS COMORBIDITY AND BODY MASS INDEX (BMI) OF 30.0 TO 30.9 IN ADULT: ICD-10-CM

## 2025-04-11 PROCEDURE — 3074F SYST BP LT 130 MM HG: CPT | Performed by: NURSE PRACTITIONER

## 2025-04-11 PROCEDURE — 99204 OFFICE O/P NEW MOD 45 MIN: CPT | Performed by: NURSE PRACTITIONER

## 2025-04-11 PROCEDURE — 3079F DIAST BP 80-89 MM HG: CPT | Performed by: NURSE PRACTITIONER

## 2025-04-11 RX ORDER — TOPIRAMATE 25 MG/1
25 TABLET, FILM COATED ORAL 2 TIMES DAILY
Qty: 60 TABLET | Refills: 2 | Status: SHIPPED | OUTPATIENT
Start: 2025-04-11

## 2025-04-11 RX ORDER — VALACYCLOVIR HYDROCHLORIDE 1 G/1
1000 TABLET, FILM COATED ORAL AS NEEDED
Qty: 10 TABLET | Refills: 2 | Status: SHIPPED | OUTPATIENT
Start: 2025-04-11

## 2025-04-11 RX ORDER — ERGOCALCIFEROL 1.25 MG/1
50000 CAPSULE, LIQUID FILLED ORAL WEEKLY
Qty: 4 CAPSULE | Refills: 11 | Status: SHIPPED | OUTPATIENT
Start: 2025-04-11

## 2025-04-11 RX ORDER — LEVOTHYROXINE SODIUM 200 UG/1
200 TABLET ORAL DAILY
Qty: 30 TABLET | Refills: 3 | Status: SHIPPED | OUTPATIENT
Start: 2025-04-11

## 2025-04-11 SDOH — ECONOMIC STABILITY: FOOD INSECURITY: WITHIN THE PAST 12 MONTHS, YOU WORRIED THAT YOUR FOOD WOULD RUN OUT BEFORE YOU GOT MONEY TO BUY MORE.: NEVER TRUE

## 2025-04-11 SDOH — ECONOMIC STABILITY: FOOD INSECURITY: WITHIN THE PAST 12 MONTHS, THE FOOD YOU BOUGHT JUST DIDN'T LAST AND YOU DIDN'T HAVE MONEY TO GET MORE.: NEVER TRUE

## 2025-04-11 SDOH — HEALTH STABILITY: PHYSICAL HEALTH: ON AVERAGE, HOW MANY DAYS PER WEEK DO YOU ENGAGE IN MODERATE TO STRENUOUS EXERCISE (LIKE A BRISK WALK)?: 2 DAYS

## 2025-04-11 SDOH — HEALTH STABILITY: PHYSICAL HEALTH: ON AVERAGE, HOW MANY MINUTES DO YOU ENGAGE IN EXERCISE AT THIS LEVEL?: 20 MIN

## 2025-04-11 ASSESSMENT — ENCOUNTER SYMPTOMS
RESPIRATORY NEGATIVE: 1
EYES NEGATIVE: 1

## 2025-04-11 ASSESSMENT — PATIENT HEALTH QUESTIONNAIRE - PHQ9
SUM OF ALL RESPONSES TO PHQ QUESTIONS 1-9: 0
SUM OF ALL RESPONSES TO PHQ QUESTIONS 1-9: 0
2. FEELING DOWN, DEPRESSED OR HOPELESS: NOT AT ALL
SUM OF ALL RESPONSES TO PHQ QUESTIONS 1-9: 0
SUM OF ALL RESPONSES TO PHQ QUESTIONS 1-9: 0
1. LITTLE INTEREST OR PLEASURE IN DOING THINGS: NOT AT ALL

## 2025-04-11 ASSESSMENT — ANXIETY QUESTIONNAIRES
IF YOU CHECKED OFF ANY PROBLEMS ON THIS QUESTIONNAIRE, HOW DIFFICULT HAVE THESE PROBLEMS MADE IT FOR YOU TO DO YOUR WORK, TAKE CARE OF THINGS AT HOME, OR GET ALONG WITH OTHER PEOPLE: NOT DIFFICULT AT ALL
7. FEELING AFRAID AS IF SOMETHING AWFUL MIGHT HAPPEN: NOT AT ALL
4. TROUBLE RELAXING: NOT AT ALL
6. BECOMING EASILY ANNOYED OR IRRITABLE: NOT AT ALL
GAD7 TOTAL SCORE: 0
3. WORRYING TOO MUCH ABOUT DIFFERENT THINGS: NOT AT ALL
2. NOT BEING ABLE TO STOP OR CONTROL WORRYING: NOT AT ALL
5. BEING SO RESTLESS THAT IT IS HARD TO SIT STILL: NOT AT ALL
1. FEELING NERVOUS, ANXIOUS, OR ON EDGE: NOT AT ALL

## 2025-04-11 NOTE — ASSESSMENT & PLAN NOTE
She take the Topiramate for this so I refilled.    Orders:    topiramate (TOPAMAX) 25 MG tablet; Take 1 tablet by mouth 2 times daily

## 2025-04-11 NOTE — ASSESSMENT & PLAN NOTE
Will refill her previous dose of thyroid medications and refer back to Dr. Pearson for management.  Labs drawn today but will be baseline off her medication for 1 month    Orders:    TSH; Future    Vitamin D 25 Hydroxy; Future    [facility] - Ignacio Pearson MD, Endocrinology, [place]    UNITHROID 200 MCG tablet; Take 1 tablet by mouth Daily    vitamin D (ERGOCALCIFEROL) 1.25 MG (50794 UT) CAPS capsule; Take 1 capsule by mouth once a week

## 2025-04-11 NOTE — PROGRESS NOTES
Alma Magana (:  1975) is a 49 y.o. female,New patient, here for evaluation of the following chief complaint(s):  New Patient, Discuss Medications, and Weight Management         Assessment & Plan  Acquired hypothyroidism    W ill refill her previous dose of thyroid medications and refer back to Dr. Pearson for management.  Labs drawn today but will be baseline off her medication for 1 month    Orders:    TSH; Future    Vitamin D 25 Hydroxy; Future    [facility] - Ignacio Pearson MD, Endocrinology, [place]    UNITHROID 200 MCG tablet; Take 1 tablet by mouth Daily    vitamin D (ERGOCALCIFEROL) 1.25 MG (93584 UT) CAPS capsule; Take 1 capsule by mouth once a week    Pain of left hip    Pt saw ortho at [facility] and did not like going there so would like a referral to [facility]    Orders:    [facility], Orthopedic Injection, [place]    Anemia, unspecified type    Pt has a history of gastric bypass and both B12 and Iron deficiency.  Will check labs today.  I refilled her previous dose of vitamin D and will have Doctor Pearson  make recommendations for replacement of iron.  She has either not been able to afford some of her vitamins or has trouble absorbing then         Anxiety    She take the Topiramate for this so I refilled.    Orders:    topiramate (TOPAMAX) 25 MG tablet; Take 1 tablet by mouth 2 times daily    Weight gain    History of gastric bypass and has recently put on 30 pounds.  She lost her insurance for awhile and has been off her thyroid medication for a month. I refilled her last dose of Unithroid and Cytomel and referred back to Dr. Pearson.  I will see her back in 2 months for a physical    Orders:    Comprehensive Metabolic Panel; Future    Lipid Panel; Future    Hemoglobin A1C; Future    [facility] Markos Escudero MD, Bariatric Surgery, [place] ([address])    CBC with Auto Differential; Future    Iron and TIBC; Future    Vitamin B12; Future    Class 1

## (undated) DEVICE — GOWN,SIRUS,FABRNF,XL,20/CS: Brand: MEDLINE

## (undated) DEVICE — SOLUTION IRRIG 1000ML 0.9% SOD CHL USP POUR PLAS BTL

## (undated) DEVICE — GENERAL LAPAROSCOPY - SMH: Brand: MEDLINE INDUSTRIES, INC.

## (undated) DEVICE — TROCAR: Brand: KII® OPTICAL ACCESS SYSTEM

## (undated) DEVICE — NEEDLE HYPO 22GA L1.5IN BLK S STL HUB POLYPR SHLD REG BVL

## (undated) DEVICE — SUTURE SZ 0 27IN 5/8 CIR UR-6  TAPER PT VIOLET ABSRB VICRYL J603H

## (undated) DEVICE — DEVICE SUT VLT PRELD W/ POLYSRB 2-0 L48IN SUT DISP FOR LAP

## (undated) DEVICE — TROCAR: Brand: KII® SLEEVE

## (undated) DEVICE — SUTURING DEVICE: Brand: ENDO STITCH

## (undated) DEVICE — REINFORCED INTELLIGENT RELOAD, FOR USE WITH SIGNIA STAPLING SYSTEM: Brand: TRI-STAPLE 2.0

## (undated) DEVICE — GLOVE SURG SZ 75 L1212IN FNGR THK138MIL BRN LTX FREE

## (undated) DEVICE — AIR SHEET,LAT,COMFORT GLIDE, BLEND 40X80: Brand: MEDLINE

## (undated) DEVICE — LAPAROSCOPIC TROCAR SLEEVE/SINGLE USE: Brand: KII® OPTICAL ACCESS SYSTEM

## (undated) DEVICE — VISUALIZATION SYSTEM: Brand: CLEARIFY

## (undated) DEVICE — CLICKLINE SCISSORS INSERT: Brand: CLICKLINE

## (undated) DEVICE — FILTER SMK EVAC FLO CLR MEGADYNE

## (undated) DEVICE — ARTICULATING RELOAD WITH TRI-STAPLE TECHNOLOGY: Brand: ENDO GIA

## (undated) DEVICE — Device

## (undated) DEVICE — ENDO CARRY-ON PROCEDURE KIT INCLUDES ENZYMATIC SPONGE, GAUZE, BIOHAZARD LABEL, TRAY, LUBRICANT, DIRTY SCOPE LABEL, WATER LABEL, TRAY, DRAWSTRING PAD, AND DEFENDO 4-PIECE KIT.: Brand: ENDO CARRY-ON PROCEDURE KIT

## (undated) DEVICE — INTELLIGENT RELOAD: Brand: TRI-STAPLE 2.0

## (undated) DEVICE — SYSTEM EVAC SMOKE LAPARSCOPIC

## (undated) DEVICE — COVER LT HNDL PLAS RIG 1 PER PK

## (undated) DEVICE — DERMABOND SKIN ADH 0.7ML -- DERMABOND ADVANCED 12/BX

## (undated) DEVICE — DRAIN SURG 19FR 100% SIL RADPQ RND CHN FULL FLUT

## (undated) DEVICE — LAP-BAND SYSTEM CALIBRATION TUBE: Brand: LAP-BAND

## (undated) DEVICE — DEVICE SUT 0 L48IN GRN POLY BRAID LD UNIT DISP SURGDAC

## (undated) DEVICE — RESERVOIR,SUCTION,100CC,SILICONE: Brand: MEDLINE

## (undated) DEVICE — STERILE POLYISOPRENE POWDER-FREE SURGICAL GLOVES WITH EMOLLIENT COATING: Brand: PROTEXIS

## (undated) DEVICE — Z INACTIVE USE 2240337 DRAPE SURG PT TRANSFER TRAWAY SHT

## (undated) DEVICE — POWER SHELL: Brand: SIGNIA

## (undated) DEVICE — 4-PORT MANIFOLD: Brand: NEPTUNE 2

## (undated) DEVICE — SUT ETHLN 2-0 18IN FS BLK --

## (undated) DEVICE — TROCAR SITE CLOSURE DEVICE: Brand: ENDO CLOSE

## (undated) DEVICE — SUTURE MCRYL SZ 4-0 L27IN ABSRB UD L19MM PS-2 1/2 CIR PRIM Y426H

## (undated) DEVICE — STAPLER INT 60 UNIV PUR W/ TRI-STAPLE TECHNOLOGY ULT FOR

## (undated) DEVICE — DISSECTOR CRV JAW 48CM CRDLS -- SONICISION

## (undated) DEVICE — SOLUTION IRRIG 1000ML STRL H2O USP PLAS POUR BTL

## (undated) DEVICE — TUBING, SUCTION, 1/4" X 12', STRAIGHT: Brand: MEDLINE

## (undated) DEVICE — GARMENT,MEDLINE,DVT,INT,CALF,MED, GEN2: Brand: MEDLINE